# Patient Record
Sex: FEMALE | Race: WHITE | NOT HISPANIC OR LATINO | Employment: UNEMPLOYED | URBAN - METROPOLITAN AREA
[De-identification: names, ages, dates, MRNs, and addresses within clinical notes are randomized per-mention and may not be internally consistent; named-entity substitution may affect disease eponyms.]

---

## 2018-12-04 ENCOUNTER — OFFICE VISIT (OUTPATIENT)
Dept: RHEUMATOLOGY | Facility: CLINIC | Age: 58
End: 2018-12-04
Payer: COMMERCIAL

## 2018-12-04 VITALS
HEIGHT: 63 IN | DIASTOLIC BLOOD PRESSURE: 98 MMHG | HEART RATE: 80 BPM | BODY MASS INDEX: 33.49 KG/M2 | SYSTOLIC BLOOD PRESSURE: 140 MMHG | WEIGHT: 189 LBS

## 2018-12-04 DIAGNOSIS — R76.8 ANA POSITIVE: ICD-10-CM

## 2018-12-04 DIAGNOSIS — R21 RASH OF FACE: ICD-10-CM

## 2018-12-04 DIAGNOSIS — M25.50 POLYARTHRALGIA: Primary | ICD-10-CM

## 2018-12-04 PROCEDURE — 99204 OFFICE O/P NEW MOD 45 MIN: CPT | Performed by: INTERNAL MEDICINE

## 2018-12-04 RX ORDER — LANOLIN ALCOHOL/MO/W.PET/CERES
400 CREAM (GRAM) TOPICAL DAILY
COMMUNITY
End: 2021-05-21 | Stop reason: ALTCHOICE

## 2018-12-04 RX ORDER — RIVAROXABAN 20 MG/1
TABLET, FILM COATED ORAL
COMMUNITY
Start: 2018-11-17

## 2018-12-04 RX ORDER — MULTIVIT WITH MINERALS/LUTEIN
250 TABLET ORAL DAILY
COMMUNITY
End: 2021-05-21 | Stop reason: ALTCHOICE

## 2018-12-04 RX ORDER — BIOTIN 1000 MCG
TABLET,CHEWABLE ORAL
COMMUNITY
End: 2021-05-21 | Stop reason: ALTCHOICE

## 2018-12-04 RX ORDER — LOSARTAN POTASSIUM 25 MG/1
TABLET ORAL
COMMUNITY
Start: 2018-12-01

## 2018-12-04 NOTE — PROGRESS NOTES
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 who presents for rheumatology evaluation regarding positive NIKOLE test and facial rash  The rash on her face goes across her cheeks and nasal bridge in a does have nasolabial sparing, and is unclear at this time if this is truly a malar rash or related to rosacea or eczema  She also reportedly has a positive NIKOLE test although I do not have any lab work for review  I discussed with her that given these findings we will pursue a workup for underlying autoimmune disease, particularly to make sure we are not missing systemic lupus  She also has a history of unprovoked blood clotting and the prior workup regarding this is not available to me  My suspicion is somewhat low given lack of other typical features of lupus such as inflammatory arthritis, mucositis, history of serositis or bone marrow abnormalities  We discussed that a portion of the general healthy population has a positive NIKOLE test and so her test may not be clinically relevant but we will do the below workup to rule this out  She will follow up with me to review in 2 months  Plan:  Diagnoses and all orders for this visit:    Polyarthralgia  -     CBC and differential  -     Comprehensive metabolic panel  -     C3 complement  -     C4 complement  -     NIKOLE Screen w/ Reflex to Titer/Pattern  -     Anticardiolipin Ab, IgG/M, Qn  -     Anti-DNA antibody, double-stranded  -     Anti-Shahnaz 1 Antibody; Future  -     Anti-scleroderma antibody  -     Beta-2 glycoprotein antibodies  -     Lupus anticoagulant  -     Centromere Antibody  -     Sedimentation rate, automated  -     RF Screen w/ Reflex to Titer; Future  -     Urinalysis with microscopic  -     Protein / creatinine ratio, urine  -     Nuclear antigen antibody; Future  -     Sjogren's Antibodies;  Future  -     Cyclic citrul peptide antibody, IgG  -     Chronic Hepatitis Panel  - TSH, 3rd generation with Free T4 reflex  -     Vitamin D 25 hydroxy  -     Anti-Shahnaz 1 Antibody  -     Nuclear antigen antibody  -     Sjogren's Antibodies    NIKOLE positive  -     CBC and differential  -     Comprehensive metabolic panel  -     C3 complement  -     C4 complement  -     NIKOLE Screen w/ Reflex to Titer/Pattern  -     Anticardiolipin Ab, IgG/M, Qn  -     Anti-DNA antibody, double-stranded  -     Anti-Shahnaz 1 Antibody; Future  -     Anti-scleroderma antibody  -     Beta-2 glycoprotein antibodies  -     Lupus anticoagulant  -     Centromere Antibody  -     Sedimentation rate, automated  -     RF Screen w/ Reflex to Titer; Future  -     Urinalysis with microscopic  -     Protein / creatinine ratio, urine  -     Nuclear antigen antibody; Future  -     Sjogren's Antibodies; Future  -     Cyclic citrul peptide antibody, IgG  -     Chronic Hepatitis Panel  -     TSH, 3rd generation with Free T4 reflex  -     Vitamin D 25 hydroxy  -     Anti-Shahnaz 1 Antibody  -     Nuclear antigen antibody  -     Sjogren's Antibodies    Rash of face    Other orders  -     losartan (COZAAR) 25 mg tablet;   -     XARELTO 20 MG tablet;   -     folic acid (FOLVITE) 362 mcg tablet; Take 400 mcg by mouth daily  -     Biotin 1000 MCG CHEW; Chew  -     ascorbic acid (VITAMIN C) 250 mg tablet; Take 250 mg by mouth daily        Follow-up plan: F/u to review in 8 weeks       HPI  Lisa Ron is a 63 y o   female with HTN and h/o DVT/PE on chronic Xarelto, who presents for rheumatology evaluation for +NIKOLE test and polyarthralgia  Referred by [facility] (PCP)  Patient reports that she was referred to see me by her primary care doctor for positive NIKOLE test   Unfortunately the primary care office did not send any lab or notes relevant to the referral today and so I do not have any information for the visit      She reports that the NIKOLE test was checked because she has been complaining of nonspecific joint pain and has had a rash on her face for the last 1 year  She reports pain in her left knee, right shoulder, and to a lesser degree in her hands  She denies a significant amount of morning stiffness but generally feels better she remains active  The rash on her face goes over her cheeks and the bridge of her nose and fluctuate in intensity  It is generally red/pink and is very itchy  She reports that her PCP gave her a cream which helps somewhat but she cannot recall the name of the cream   She denies an association of the facial rash with sun exposure  She does not get a rash anywhere else and has no history of psoriasis  She reports that she has a history of DVT and subsequent PE 5 years ago, and is on lifelong Xarelto  She reports this was unprovoked and there was no ongoing medical illness or surgery at this time  She denies any miscarriages  She has 6 children and majority were healthy pregnancies except for 1 which was born 2 months premature  All of her children are healthy  She reports purple color changes of her fingers when she is cold  Denies photosensitivity, psoriasis, sicca symptoms, oral or nasal ulcers, alopecia, h/o pericarditis or pleurisy  Review of Systems  Review of Systems   Constitutional: Negative for chills, fatigue, fever and unexpected weight change  HENT: Negative for mouth sores and trouble swallowing  Eyes: Negative for pain and visual disturbance  Respiratory: Negative for cough and shortness of breath  Cardiovascular: Negative for chest pain and leg swelling  Gastrointestinal: Negative for abdominal pain, blood in stool, constipation, diarrhea and nausea  Genitourinary: Negative for hematuria  Musculoskeletal: Positive for arthralgias  Negative for back pain, joint swelling and myalgias  Skin: Positive for color change (purple ) and rash (facial rash)  Neurological: Negative for weakness and numbness  Hematological: Negative for adenopathy  Psychiatric/Behavioral: Negative for sleep disturbance  Allergies  Allergies   Allergen Reactions    Aspirin     Fluticasone        Home Medications    Current Outpatient Prescriptions:     ascorbic acid (VITAMIN C) 250 mg tablet, Take 250 mg by mouth daily, Disp: , Rfl:     Biotin 1000 MCG CHEW, Chew, Disp: , Rfl:     folic acid (FOLVITE) 321 mcg tablet, Take 400 mcg by mouth daily, Disp: , Rfl:     losartan (COZAAR) 25 mg tablet, , Disp: , Rfl:     XARELTO 20 MG tablet, , Disp: , Rfl:     Past Medical History  Past Medical History:   Diagnosis Date    History of DVT (deep vein thrombosis) 2013    History of pulmonary embolism 2013    Hypertension        Past Surgical History   Past Surgical History:   Procedure Laterality Date    BREAST MASS EXCISION  "Benign" per patient         Family History  No known family history of autoimmune or inflammatory diseases  Possible RA in her mother   Family History   Problem Relation Age of Onset    Deep vein thrombosis Father        Social History  Occupation: Works in [employer] service for a [employer]   History   Alcohol Use    Yes     Comment: socially     History   Drug use: Unknown     History   Smoking Status    Never Smoker   Smokeless Tobacco    Never Used       Objective:    Vitals:    12/04/18 1323   BP: 140/98   Pulse: 80   Weight: 85 7 kg (189 lb)   Height: 5' 3" (1 6 m)       Physical Exam   Constitutional: She appears well-developed and well-nourished  No distress  HENT:   Head: Normocephalic  Mouth/Throat: Oropharynx is clear and moist and mucous membranes are normal    Eyes: Conjunctivae and EOM are normal  No scleral icterus  Neck: No spinous process tenderness and no muscular tenderness present  No thyromegaly present  Cardiovascular: Normal rate, regular rhythm, S1 normal and S2 normal   Exam reveals no friction rub  No murmur heard  Pulmonary/Chest: Effort normal and breath sounds normal  No respiratory distress   She has no wheezes  She has no rhonchi  She has no rales  Abdominal: Soft  She exhibits no distension  There is no hepatosplenomegaly  There is no tenderness  Musculoskeletal:   Some tissue tender in the   Peripheral joints are tender and the right glenohumeral joint and right elbow  No obvious synovitis on joint exam    Lymphadenopathy:     She has no cervical adenopathy  Neurological: She is alert  She has normal strength  Skin: Skin is warm and dry  No rash noted  Nails show no clubbing  Erythema in malar distribution with nasolabial sparing    Psychiatric: She has a normal mood and affect         Imaging:   No [facility] imaging available for review     Labs:   No labs sent for review

## 2018-12-04 NOTE — LETTER
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 who presents for rheumatology evaluation regarding positive NIKOLE test and facial rash  The rash on her face goes across her cheeks and nasal bridge in a does have nasolabial sparing, and is unclear at this time if this is truly a malar rash or related to rosacea or eczema  She also reportedly has a positive NIKOLE test although I do not have any lab work for review  I discussed with her that given these findings we will pursue a workup for underlying autoimmune disease, particularly to make sure we are not missing systemic lupus  She also has a history of unprovoked blood clotting and the prior workup regarding this is not available to me  My suspicion is somewhat low given lack of other typical features of lupus such as inflammatory arthritis, mucositis, history of serositis or bone marrow abnormalities  We discussed that a portion of the general healthy population has a positive NIKOLE test and so her test may not be clinically relevant but we will do the below workup to rule this out  She will follow up with me to review in 2 months      Plan:  Diagnoses and all orders for this visit:    Polyarthralgia  -     CBC and differential  -     Comprehensive metabolic panel  -     C3 complement  -     C4 complement  -     NIKOLE Screen w/ Reflex to Titer/Pattern  -     Anticardiolipin Ab, IgG/M, Qn  -     Anti-DNA antibody, double-stranded  -     Anti-Shahnaz 1 Antibody; Future  -     Anti-scleroderma antibody  -     Beta-2 glycoprotein antibodies  -     Lupus anticoagulant  -     Centromere Antibody  -     Sedimentation rate, automated  -     RF Screen w/ Reflex to Titer; Future  -     Urinalysis with microscopic  -     Protein / creatinine ratio, urine  -     Nuclear antigen antibody; Future  -     Sjogren's Antibodies; Future  -     Cyclic citrul peptide antibody, IgG  -     Chronic Hepatitis Panel  -     TSH, 3rd generation with Free T4 reflex  -     Vitamin D 25 hydroxy  -     Anti-Shahnaz 1 Antibody  -     Nuclear antigen antibody  -     Sjogren's Antibodies    NIKOLE positive  -     CBC and differential  -     Comprehensive metabolic panel  -     C3 complement  -     C4 complement  -     NIKOLE Screen w/ Reflex to Titer/Pattern  -     Anticardiolipin Ab, IgG/M, Qn  -     Anti-DNA antibody, double-stranded  -     Anti-Shahnaz 1 Antibody; Future  -     Anti-scleroderma antibody  -     Beta-2 glycoprotein antibodies  -     Lupus anticoagulant  -     Centromere Antibody  -     Sedimentation rate, automated  -     RF Screen w/ Reflex to Titer; Future  -     Urinalysis with microscopic  -     Protein / creatinine ratio, urine  -     Nuclear antigen antibody; Future  -     Sjogren's Antibodies; Future  -     Cyclic citrul peptide antibody, IgG  -     Chronic Hepatitis Panel  -     TSH, 3rd generation with Free T4 reflex  -     Vitamin D 25 hydroxy  -     Anti-Shahnaz 1 Antibody  -     Nuclear antigen antibody  -     Sjogren's Antibodies    Rash of face    Other orders  -     losartan (COZAAR) 25 mg tablet;   -     XARELTO 20 MG tablet;   -     folic acid (FOLVITE) 649 mcg tablet; Take 400 mcg by mouth daily  -     Biotin 1000 MCG CHEW; Chew  -     ascorbic acid (VITAMIN C) 250 mg tablet;  Take 250 mg by mouth daily        Follow-up plan: F/u to review in 8 weeks       HPI  Mari Lizama is a 63 y o   female with HTN and h/o DVT/PE on chronic Xarelto, who presents for rheumatology evaluation for +NIKOLE test and polyarthralgia  Referred by [facility] (PCP)  Patient reports that she was referred to see me by her primary care doctor for positive NIKOLE test   Unfortunately the primary care office did not send any lab or notes relevant to the referral today and so I do not have any information for the visit  She reports that the NIKOLE test was checked because she has been complaining of nonspecific joint pain and has had a rash on her face for the last 1 year  She reports pain in her left knee, right shoulder, and to a lesser degree in her hands  She denies a significant amount of morning stiffness but generally feels better she remains active  The rash on her face goes over her cheeks and the bridge of her nose and fluctuate in intensity  It is generally red/pink and is very itchy  She reports that her PCP gave her a cream which helps somewhat but she cannot recall the name of the cream   She denies an association of the facial rash with sun exposure  She does not get a rash anywhere else and has no history of psoriasis  She reports that she has a history of DVT and subsequent PE 5 years ago, and is on lifelong Xarelto  She reports this was unprovoked and there was no ongoing medical illness or surgery at this time  She denies any miscarriages  She has 6 children and majority were healthy pregnancies except for 1 which was born 2 months premature  All of her children are healthy  She reports purple color changes of her fingers when she is cold  Denies photosensitivity, psoriasis, sicca symptoms, oral or nasal ulcers, alopecia, h/o pericarditis or pleurisy  Review of Systems  Review of Systems   Constitutional: Negative for chills, fatigue, fever and unexpected weight change  HENT: Negative for mouth sores and trouble swallowing  Eyes: Negative for pain and visual disturbance     Respiratory: Negative for cough and shortness of breath  Cardiovascular: Negative for chest pain and leg swelling  Gastrointestinal: Negative for abdominal pain, blood in stool, constipation, diarrhea and nausea  Genitourinary: Negative for hematuria  Musculoskeletal: Positive for arthralgias  Negative for back pain, joint swelling and myalgias  Skin: Positive for color change (purple ) and rash (facial rash)  Neurological: Negative for weakness and numbness  Hematological: Negative for adenopathy  Psychiatric/Behavioral: Negative for sleep disturbance  Allergies  Allergies   Allergen Reactions    Aspirin     Fluticasone        Home Medications    Current Outpatient Prescriptions:     ascorbic acid (VITAMIN C) 250 mg tablet, Take 250 mg by mouth daily, Disp: , Rfl:     Biotin 1000 MCG CHEW, Chew, Disp: , Rfl:     folic acid (FOLVITE) 012 mcg tablet, Take 400 mcg by mouth daily, Disp: , Rfl:     losartan (COZAAR) 25 mg tablet, , Disp: , Rfl:     XARELTO 20 MG tablet, , Disp: , Rfl:     Past Medical History  Past Medical History:   Diagnosis Date    History of DVT (deep vein thrombosis) 2013    History of pulmonary embolism 2013    Hypertension        Past Surgical History   Past Surgical History:   Procedure Laterality Date    BREAST MASS EXCISION  "Benign" per patient         Family History  No known family history of autoimmune or inflammatory diseases  Possible RA in her mother   Family History   Problem Relation Age of Onset    Deep vein thrombosis Father        Social History  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   History   Alcohol Use    Yes     Comment: socially     History   Drug use: Unknown     History   Smoking Status    Never Smoker   Smokeless Tobacco    Never Used       Objective:    Vitals:    12/04/18 1323   BP: 140/98   Pulse: 80   Weight: 85 7 kg (189 lb)   Height: 5' 3" (1 6 m)       Physical Exam   Constitutional: She appears well-developed and well-nourished  No distress     HENT: Head: Normocephalic  Mouth/Throat: Oropharynx is clear and moist and mucous membranes are normal    Eyes: Conjunctivae and EOM are normal  No scleral icterus  Neck: No spinous process tenderness and no muscular tenderness present  No thyromegaly present  Cardiovascular: Normal rate, regular rhythm, S1 normal and S2 normal   Exam reveals no friction rub  No murmur heard  Pulmonary/Chest: Effort normal and breath sounds normal  No respiratory distress  She has no wheezes  She has no rhonchi  She has no rales  Abdominal: Soft  She exhibits no distension  There is no hepatosplenomegaly  There is no tenderness  Musculoskeletal:   Some tissue tender in the   Peripheral joints are tender and the right glenohumeral joint and right elbow  No obvious synovitis on joint exam    Lymphadenopathy:     She has no cervical adenopathy  Neurological: She is alert  She has normal strength  Skin: Skin is warm and dry  No rash noted  Nails show no clubbing  Erythema in malar distribution with nasolabial sparing    Psychiatric: She has a normal mood and affect         Imaging:   No [facility] imaging available for review     Labs:   No labs sent for review

## 2018-12-12 LAB
25(OH)D3+25(OH)D2 SERPL-MCNC: 31.7 NG/ML (ref 30–100)
ALBUMIN SERPL-MCNC: 4.6 G/DL (ref 3.5–5.5)
ALBUMIN/GLOB SERPL: 2.2 {RATIO} (ref 1.2–2.2)
ALP SERPL-CCNC: 52 IU/L (ref 39–117)
ALT SERPL-CCNC: 19 IU/L (ref 0–32)
ANA TITR SER IF: POSITIVE {TITER}
APPEARANCE UR: CLEAR
APTT SCREEN TO CONFIRM RATIO: 0.81 RATIO (ref 0–1.4)
AST SERPL-CCNC: 19 IU/L (ref 0–40)
B2 GLYCOPROT1 IGA SER-ACNC: <9 GPI IGA UNITS (ref 0–25)
B2 GLYCOPROT1 IGG SER-ACNC: <9 GPI IGG UNITS (ref 0–20)
B2 GLYCOPROT1 IGM SER-ACNC: <9 GPI IGM UNITS (ref 0–32)
BACTERIA URNS QL MICRO: NORMAL
BASOPHILS # BLD AUTO: 0 X10E3/UL (ref 0–0.2)
BASOPHILS NFR BLD AUTO: 0 %
BILIRUB SERPL-MCNC: 0.3 MG/DL (ref 0–1.2)
BILIRUB UR QL STRIP: NEGATIVE
BUN SERPL-MCNC: 13 MG/DL (ref 6–24)
BUN/CREAT SERPL: 15 (ref 9–23)
C3 SERPL-MCNC: 116 MG/DL (ref 82–167)
C4 SERPL-MCNC: 20 MG/DL (ref 14–44)
CALCIUM SERPL-MCNC: 9.5 MG/DL (ref 8.7–10.2)
CARDIOLIPIN IGG SER IA-ACNC: <9 GPL U/ML (ref 0–14)
CARDIOLIPIN IGM SER IA-ACNC: <9 MPL U/ML (ref 0–12)
CCP IGA+IGG SERPL IA-ACNC: 179 UNITS (ref 0–19)
CENTROMERE AB TITR SER IF: NORMAL {TITER}
CENTROMERE B AB SER-ACNC: <0.2 AI (ref 0–0.9)
CHLORIDE SERPL-SCNC: 102 MMOL/L (ref 96–106)
CO2 SERPL-SCNC: 26 MMOL/L (ref 20–29)
COLOR UR: YELLOW
CONFIRM APTT/NORMAL: 44.7 SEC (ref 0–55)
CREAT SERPL-MCNC: 0.86 MG/DL (ref 0.57–1)
CREAT UR-MCNC: 34.5 MG/DL
DSDNA AB SER-ACNC: 7 IU/ML (ref 0–9)
ENA JO1 AB SER-ACNC: <0.2 AI (ref 0–0.9)
ENA RNP AB SER-ACNC: <0.2 AI (ref 0–0.9)
ENA SCL70 AB SER-ACNC: <0.2 AI (ref 0–0.9)
ENA SM AB SER-ACNC: <0.2 AI (ref 0–0.9)
ENA SS-A AB SER-ACNC: <0.2 AI (ref 0–0.9)
ENA SS-B AB SER-ACNC: <0.2 AI (ref 0–0.9)
EOSINOPHIL # BLD AUTO: 0.1 X10E3/UL (ref 0–0.4)
EOSINOPHIL NFR BLD AUTO: 2 %
EPI CELLS #/AREA URNS HPF: NORMAL /HPF
ERYTHROCYTE [DISTWIDTH] IN BLOOD BY AUTOMATED COUNT: 13.8 % (ref 12.3–15.4)
ERYTHROCYTE [SEDIMENTATION RATE] IN BLOOD BY WESTERGREN METHOD: 9 MM/HR (ref 0–40)
GLOBULIN SER-MCNC: 2.1 G/DL (ref 1.5–4.5)
GLUCOSE SERPL-MCNC: 74 MG/DL (ref 65–99)
GLUCOSE UR QL: NEGATIVE
HAV IGM SERPL QL IA: NEGATIVE
HBV CORE IGM SERPL QL IA: NEGATIVE
HBV SURFACE AG SERPL QL IA: NEGATIVE
HCT VFR BLD AUTO: 41.6 % (ref 34–46.6)
HCV AB S/CO SERPL IA: 0.2 S/CO RATIO (ref 0–0.9)
HGB BLD-MCNC: 14 G/DL (ref 11.1–15.9)
HGB UR QL STRIP: NEGATIVE
IMM GRANULOCYTES # BLD: 0 X10E3/UL (ref 0–0.1)
IMM GRANULOCYTES NFR BLD: 0 %
KETONES UR QL STRIP: NEGATIVE
LA PPP-IMP: NORMAL
LEUKOCYTE ESTERASE UR QL STRIP: NEGATIVE
LYMPHOCYTES # BLD AUTO: 2.8 X10E3/UL (ref 0.7–3.1)
LYMPHOCYTES NFR BLD AUTO: 42 %
MCH RBC QN AUTO: 30.5 PG (ref 26.6–33)
MCHC RBC AUTO-ENTMCNC: 33.7 G/DL (ref 31.5–35.7)
MCV RBC AUTO: 91 FL (ref 79–97)
MICRO URNS: NORMAL
MICRO URNS: NORMAL
MONOCYTES # BLD AUTO: 0.5 X10E3/UL (ref 0.1–0.9)
MONOCYTES NFR BLD AUTO: 8 %
NEUTROPHILS # BLD AUTO: 3.2 X10E3/UL (ref 1.4–7)
NEUTROPHILS NFR BLD AUTO: 48 %
NITRITE UR QL STRIP: NEGATIVE
PH UR STRIP: 7.5 [PH] (ref 5–7.5)
PLATELET # BLD AUTO: 243 X10E3/UL (ref 150–379)
POTASSIUM SERPL-SCNC: 4.3 MMOL/L (ref 3.5–5.2)
PROT SERPL-MCNC: 6.7 G/DL (ref 6–8.5)
PROT UR QL STRIP: NEGATIVE
PROT UR-MCNC: 4.2 MG/DL
PROT/CREAT UR: 122 MG/G CREAT (ref 0–200)
RBC # BLD AUTO: 4.59 X10E6/UL (ref 3.77–5.28)
RBC #/AREA URNS HPF: NORMAL /HPF
RHEUMATOID FACT SERPL-ACNC: 10.6 IU/ML (ref 0–13.9)
SCREEN APTT: 36.7 SEC (ref 0–51.9)
SCREEN DRVVT: 38 SEC (ref 0–47)
SL AMB EGFR AFRICAN AMERICAN: 87 ML/MIN/1.73
SL AMB EGFR NON AFRICAN AMERICAN: 75 ML/MIN/1.73
SL AMB NOTE:: NORMAL
SODIUM SERPL-SCNC: 140 MMOL/L (ref 134–144)
SP GR UR: 1.01 (ref 1–1.03)
THROMBIN TIME: 16.2 SEC (ref 0–23)
TSH SERPL DL<=0.005 MIU/L-ACNC: 0.95 UIU/ML (ref 0.45–4.5)
UROBILINOGEN UR STRIP-ACNC: 0.2 EU/DL (ref 0.2–1)
WBC # BLD AUTO: 6.6 X10E3/UL (ref 3.4–10.8)
WBC #/AREA URNS HPF: NORMAL /HPF

## 2021-05-19 ENCOUNTER — IMMUNIZATIONS (OUTPATIENT)
Dept: FAMILY MEDICINE CLINIC | Facility: HOSPITAL | Age: 61
End: 2021-05-19

## 2021-05-19 DIAGNOSIS — Z23 ENCOUNTER FOR IMMUNIZATION: Primary | ICD-10-CM

## 2021-05-19 PROCEDURE — 0001A SARS-COV-2 / COVID-19 MRNA VACCINE (PFIZER-BIONTECH) 30 MCG: CPT

## 2021-05-19 PROCEDURE — 91300 SARS-COV-2 / COVID-19 MRNA VACCINE (PFIZER-BIONTECH) 30 MCG: CPT

## 2021-05-20 NOTE — PROGRESS NOTES
Assessment and Plan:   Patient is a 71-year-old female who presents for rheumatology follow-up, previously seen in 2018 and at that time had joint pain an a positive CCP antibody of moderate titer, but no clinical evidence for active rheumatoid  She did not follow-up after that point and returns today with right knee pain and right lower back pain with sciatica symptoms down the right lower extremity  She does not have any typical complaints to suggest active rheumatoid and we will recheck on these labs at this time  Her exam also does not show any concerns for active inflammatory arthritis which we discussed  I did refer her to spine and Pain Management for her lower back pain and sciatica, and also to Sports Medicine for her right knee pain as she is unable to have an MRI done and may benefit from ultrasound to assess the knee further  She was agreeable with that plan  We will otherwise follow-up in about 6 months  Plan:  Diagnoses and all orders for this visit:    Cyclic citrullinated peptide (CCP) antibody positive  -     Cyclic citrul peptide antibody, IgG  -     C-reactive protein  -     Sedimentation rate, automated  -     RF Screen w/ Reflex to Titer  -     CBC and differential  -     Comprehensive metabolic panel    Polyarthralgia  -     Cyclic citrul peptide antibody, IgG  -     C-reactive protein  -     Sedimentation rate, automated  -     RF Screen w/ Reflex to Titer  -     CBC and differential  -     Comprehensive metabolic panel    NIKOLE positive  -     Cyclic citrul peptide antibody, IgG  -     C-reactive protein  -     Sedimentation rate, automated  -     RF Screen w/ Reflex to Titer  -     CBC and differential  -     Comprehensive metabolic panel    Chronic right-sided low back pain with right-sided sciatica  -     Ambulatory referral to Pain Management; Future    Chronic pain of right knee  -     Ambulatory referral to Sports Medicine;  Future    Other orders  -     Cancel: XR hand 3+ vw right; Future  -     Cancel: XR hand 3+ vw left; Future        Follow-up plan: 6 months        Rheumatic Disease Summary  1  +CCP antibody, +NIKOLE  -Rheum eval 12/4/18 for polyarthralgia, +NIKOLE, facial rash; no clinical evidence of synovitis   -Labs: +NIKOLE 1:80 centromere with negative panel, +, negative RF, APLs, Hep B/C, C3/4  -Visit 5/25/21: suspect sciatica and right knee OA, referred to spine/PM and sports med; no evidence for active IA   2  H/o DVT and PE, on lifelong xarelto     HPI  Rufus Evans is a 62 y o   female who presents for rheumatology follow-up, last seen for initial evaluation in December 2018, at that time for positive NIKOLE test   Further workup was unrevealing for any positive autoantibodies associated with the NIKOLE, but she did have a moderate to high titer positive CCP antibody  At that time, she did not have any clinical signs of rheumatoid arthritis and the plan was to continue monitoring, she did not return for follow-up after that point  Reports she is coming back in for increased joint pain in the right knee, right lower back with radiation down the right leg  Also having right knee pain which is separate from the pain radiating down her right leg  She denies any injury in the right knee  She states she cannot have an MRI because she has a permanent IVC filter for her blood clotting disorder  She is wondering if other imaging like ultrasound can be useful  She pulls up reports of x-ray of the lumbar spine and right knee on her phone which showed osteoarthritis in the knee, and degenerative arthritis and disc disease in the lumbar spine  She otherwise does not have complaints about joint pain or prolonged stiffness in the morning in other joints such as her wrists, hands and feet  No joint swelling      The following portions of the patient's history were reviewed and updated as appropriate: allergies, current medications, past family history, past medical history, past social history, past surgical history and problem list     Review of Systems:   Review of Systems   Constitutional: Negative for fatigue and unexpected weight change  HENT: Negative for mouth sores  Respiratory: Negative for cough and shortness of breath  Gastrointestinal: Negative for constipation and diarrhea  Musculoskeletal: Positive for arthralgias and back pain  Negative for joint swelling and myalgias  Skin: Negative for color change and rash  Neurological: Negative for weakness  Psychiatric/Behavioral: Negative for sleep disturbance  Home Medications:    Current Outpatient Medications:     losartan (COZAAR) 25 mg tablet, , Disp: , Rfl:     XARELTO 20 MG tablet, , Disp: , Rfl:     Objective:    Vitals:    05/25/21 1248   Pulse: 84   Weight: 91 8 kg (202 lb 6 4 oz)   Height: 5' 3" (1 6 m)       Physical Exam  Constitutional:       General: She is not in acute distress  Appearance: She is well-developed  HENT:      Head: Normocephalic and atraumatic  Eyes:      General: Lids are normal  No scleral icterus  Conjunctiva/sclera: Conjunctivae normal    Neck:      Musculoskeletal: Neck supple  Pulmonary:      Effort: Pulmonary effort is normal  No tachypnea, accessory muscle usage or respiratory distress  Musculoskeletal:      Comments: No joint swelling or synovitis anywhere  Skin:     General: Skin is dry  Findings: No rash  Neurological:      Mental Status: She is alert  Psychiatric:         Behavior: Behavior normal  Behavior is cooperative  Labs:   Component      Latest Ref Rng & Units 12/7/2018           3:43 PM   White Blood Cell Count      3 4 - 10 8 x10E3/uL 6 6   Red Blood Cell Count      3 77 - 5 28 x10E6/uL 4 59   Hemoglobin      11 1 - 15 9 g/dL 14 0   HCT      34 0 - 46 6 % 41 6   MCV      79 - 97 fL 91   MCH      26 6 - 33 0 pg 30 5   MCHC        31 5 - 35 7 g/dL 33 7   RDW      12 3 - 15 4 % 13 8   Platelet Count      674 - 379 x10E3/uL 243   Neutrophils      Not Estab  % 48   Lymphocytes      Not Estab  % 42   Monocytes      Not Estab  % 8   Eosinophils      Not Estab  % 2   Basophils PCT      Not Estab  % 0   Neutrophils (Absolute)      1 4 - 7 0 x10E3/uL 3 2   Lymphocytes (Absolute)      0 7 - 3 1 x10E3/uL 2 8   Monocytes (Absolute)      0 1 - 0 9 x10E3/uL 0 5   Eosinophils (Absolute)      0 0 - 0 4 x10E3/uL 0 1   Basophils ABS      0 0 - 0 2 x10E3/uL 0 0   Immature Granulocytes      Not Estab  % 0   Immature Granulocytes (Absolute)      0 0 - 0 1 x10E3/uL 0 0   Glucose, Random      65 - 99 mg/dL 74   BUN      6 - 24 mg/dL 13   Creatinine      0 57 - 1 00 mg/dL 0 86   eGFR Non       >59 mL/min/1 73 75   eGFR       >59 mL/min/1 73 87   SL AMB BUN/CREATININE RATIO      9 - 23 15   Sodium      134 - 144 mmol/L 140   Potassium      3 5 - 5 2 mmol/L 4 3   Chloride      96 - 106 mmol/L 102   CO2      20 - 29 mmol/L 26   CALCIUM      8 7 - 10 2 mg/dL 9 5   Total Protein      6 0 - 8 5 g/dL 6 7   Albumin      3 5 - 5 5 g/dL 4 6   Globulin, Total      1 5 - 4 5 g/dL 2 1   Albumin/Globulin Ratio      1 2 - 2 2 2 2   TOTAL BILIRUBIN      0 0 - 1 2 mg/dL 0 3   ALKALINE PHOSPHATASE ISOENZYMES      39 - 117 IU/L 52   AST      0 - 40 IU/L 19   ALT      0 - 32 IU/L 19   SL AMB SPECIFIC GRAVITY-URINE      1 005 - 1 030    pH      5 0 - 7 5    Color, UA      Yellow    Urine Appearance      Clear    Leukocytes, UA      Negative    Protein      Negative/Trace    Glucose, 24 HR Urine      Negative    Ketones, UA      Negative    Blood, UA      Negative    Bilirubin, Urine      Negative    SL AMB POCT UROBILINOGEN      0 2 - 1 0 EU/dL    Nitrite, UA      Negative    MICROSCOPIC EXAMINATION          DILUTE PROTHROMBIN TIME(DPT)      0 0 - 55 0 sec 44 7   DPT CONFIRM RATIO      0 00 - 1 40 Ratio 0 81   THROMBIN TIME (DRVW)      0 0 - 23 0 sec 16 2   PTT LUPUS ANTICOAGULANT      0 0 - 51 9 sec 36 7   DILUTE GEOVANY VIPER VENOM TIME 0 0 - 47 0 sec 38 0   LUPUS REFLEX INTERPRETATION       Comment:   WBC, UA      0 - 5 /hpf 0-5   RBC, UA      0 - 2 /hpf None seen   EPITHELEAL UA      0 - 10 /hpf 0-10   Bacteria, UA      None seen/Few Few   HEPATITIS A IGM ANTIBODY      Negative Negative   HBsAg Screen      Negative Negative   HEPATITIS B CORE IGM ANTIBODY      Negative Negative   HEPATITIS C ANTIBODY      0 0 - 0 9 s/co ratio 0 2   BETA-2 GLYCOPROTEIN 1 IGG ANTIBODY      0 - 20 GPI IgG units <9   BETA-2 GLYCOPROTEIN 1 IGA ANTIBODY      0 - 25 GPI IgA units <9   BETA-2 GLYCOPROTEIN 1 IGM ANTIBODY      0 - 32 GPI IgM units <9   EXT Creatinine Urine      Not Estab  mg/dL 34 5   POCT URINE PROTEIN      Not Estab  mg/dL 4 2   Prot/Creat Ratio, Ur      0 - 200 mg/g creat 122   ANTICARDIOLIPIN IGG ANTIBODY      0 - 14 GPL U/mL <9   ANTICARDIOLIPIN IGM ANTIBODY      0 - 12 MPL U/mL <9   JOSEFINA TO RNP ANTIBODY      0 0 - 0 9 AI <0 2   JOSEFINA TO SMITH (SM) ANTIBODY      0 0 - 0 9 AI <0 2   SSA (RO) ANTIBODY      0 0 - 0 9 AI <0 2   SSB (LA) ANTIBODY      0 0 - 0 9 AI <0 2   Centromere Pattern       1:80   Note       Comment   C3 Complement      82 - 167 mg/dL 116   C4, COMPLEMENT      14 - 44 mg/dL 20   ANTI DNA DOUBLE STRANDED      0 - 9 IU/mL 7   SCLERODERMA (SCL-70) AB      0 0 - 0 9 AI <0 2   Anti-Centromere B Antibodies      0 0 - 0 9 AI <0 2   Sed Rate      0 - 40 mm/hr 9   CYCLIC CITRULLINATED PEPTIDE ANTIBODY      0 - 19 units 179 (H)   TSH, POC      0 450 - 4 500 uIU/mL 0 951   25-Hydroxy, Vitamin D      30 0 - 100 0 ng/mL 31 7   ANTI ANIA-1 IGG      0 0 - 0 9 AI <0 2   RA LATEX TURBID      0 0 - 13 9 IU/mL 10 6   Antinuclear Antibodies, IFA       Positive (A)     Component      Latest Ref Rng & Units 12/7/2018           3:43 PM   SL AMB SPECIFIC GRAVITY-URINE      1 005 - 1 030 1 010   pH      5 0 - 7 5 7 5   Color, UA      Yellow Yellow   Urine Appearance      Clear Clear   Leukocytes, UA      Negative Negative   Protein      Negative/Trace Negative Glucose, 24 HR Urine      Negative Negative   Ketones, UA      Negative Negative   Blood, UA      Negative Negative   Bilirubin, Urine      Negative Negative   SL AMB POCT UROBILINOGEN      0 2 - 1 0 EU/dL 0 2   Nitrite, UA      Negative Negative

## 2021-05-25 ENCOUNTER — OFFICE VISIT (OUTPATIENT)
Dept: RHEUMATOLOGY | Facility: CLINIC | Age: 61
End: 2021-05-25
Payer: COMMERCIAL

## 2021-05-25 VITALS — HEART RATE: 84 BPM | WEIGHT: 202.4 LBS | BODY MASS INDEX: 35.86 KG/M2 | HEIGHT: 63 IN

## 2021-05-25 DIAGNOSIS — G89.29 CHRONIC RIGHT-SIDED LOW BACK PAIN WITH RIGHT-SIDED SCIATICA: ICD-10-CM

## 2021-05-25 DIAGNOSIS — R76.8 CYCLIC CITRULLINATED PEPTIDE (CCP) ANTIBODY POSITIVE: Primary | ICD-10-CM

## 2021-05-25 DIAGNOSIS — M25.50 POLYARTHRALGIA: ICD-10-CM

## 2021-05-25 DIAGNOSIS — R76.8 ANA POSITIVE: ICD-10-CM

## 2021-05-25 DIAGNOSIS — G89.29 CHRONIC PAIN OF RIGHT KNEE: ICD-10-CM

## 2021-05-25 DIAGNOSIS — M25.561 CHRONIC PAIN OF RIGHT KNEE: ICD-10-CM

## 2021-05-25 DIAGNOSIS — M54.41 CHRONIC RIGHT-SIDED LOW BACK PAIN WITH RIGHT-SIDED SCIATICA: ICD-10-CM

## 2021-05-25 PROCEDURE — 99215 OFFICE O/P EST HI 40 MIN: CPT | Performed by: INTERNAL MEDICINE

## 2021-06-07 ENCOUNTER — OFFICE VISIT (OUTPATIENT)
Dept: OBGYN CLINIC | Facility: CLINIC | Age: 61
End: 2021-06-07
Payer: COMMERCIAL

## 2021-06-07 ENCOUNTER — APPOINTMENT (OUTPATIENT)
Dept: RADIOLOGY | Facility: CLINIC | Age: 61
End: 2021-06-07
Payer: COMMERCIAL

## 2021-06-07 VITALS
BODY MASS INDEX: 35.79 KG/M2 | WEIGHT: 202 LBS | SYSTOLIC BLOOD PRESSURE: 122 MMHG | DIASTOLIC BLOOD PRESSURE: 68 MMHG | HEIGHT: 63 IN

## 2021-06-07 DIAGNOSIS — G89.29 CHRONIC PAIN OF RIGHT KNEE: ICD-10-CM

## 2021-06-07 DIAGNOSIS — M25.559 HIP PAIN: ICD-10-CM

## 2021-06-07 DIAGNOSIS — M22.2X1 PATELLOFEMORAL PAIN SYNDROME OF RIGHT KNEE: Primary | ICD-10-CM

## 2021-06-07 DIAGNOSIS — M25.561 CHRONIC PAIN OF RIGHT KNEE: ICD-10-CM

## 2021-06-07 DIAGNOSIS — M70.61 TROCHANTERIC BURSITIS OF RIGHT HIP: ICD-10-CM

## 2021-06-07 PROCEDURE — 73564 X-RAY EXAM KNEE 4 OR MORE: CPT

## 2021-06-07 PROCEDURE — 1036F TOBACCO NON-USER: CPT | Performed by: FAMILY MEDICINE

## 2021-06-07 PROCEDURE — 73502 X-RAY EXAM HIP UNI 2-3 VIEWS: CPT

## 2021-06-07 PROCEDURE — 3008F BODY MASS INDEX DOCD: CPT | Performed by: FAMILY MEDICINE

## 2021-06-07 PROCEDURE — 99244 OFF/OP CNSLTJ NEW/EST MOD 40: CPT | Performed by: FAMILY MEDICINE

## 2021-06-07 NOTE — PATIENT INSTRUCTIONS
Explained the patient that she has multiple issues with her right leg pain  On examination today she does have clinical evidence of patellofemoral pain syndrome localized to the right knee  See below for further details of patellofemoral pain syndrome  For patient's right-sided hip and leg pain in the lateral aspect explained she likely has iliotibial band syndrome with trochanteric bursitis at the hip pointer  I recommended physical therapy treatment trial at this time  She is to avoid any NSAIDs due to blood thinners associated with previous clots and pulmonary embolism  She may take Tylenol as needed for pain per instructions below  I have also recommended physical therapy trial   If she does not improve we will consider ultrasound-guided corticosteroid injection to the right knee and right lateral hip region at the trochanteric bursa of the hip pointer  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 pain syndrome) AKA runner's knee  Chondromalacia patella (softening and wear of knee cap cartilaginous cushion) is another name sometimes used interchangeably to capture the physiological effects of PFPS  The patella (knee cap) moves on a track along your femur and many people have improper tracking of the knee cap which results in popping off of the groove track, wear of the patella and causes pain under the knee cap and on the sides of the knee cap  Many people, even those in great shape or runners, are unaware that they have tight hamstrings or calf muscles, weak gluteus medius butt muscles, or a weak inner thigh quad muscle known as the VMO  Others have inherent risk factors such as flat feet (pes planus)  Treatment requires a rest phase and a rehabilitation phase  Patients with severe symptoms benefit greatest from complete rest to avoid running and at the very least start cross-training using stationary bike   Moderate to severe symptoms may require reduction of intensity (avoiding hills, steps) and length of training  Rehabilitation requires PT (physical therapy)- both home, and if ordered, formal at the PT office  PT is the curative treatment as it works over time to strengthen the appropriate muscles while your knee heals and some studies do show increased benefit of formal PT over home exercises  You should expect to see significant improvement after 6 weeks of daily therapy but be prepared to not see full results for 3 months  If you continue to run or stress your knee, then you will prevent healing and you should not expect to see much improvements even with PT  Taping and bracing offering some relief but no longterm cure  Steroid Injections help to reduce pain but there is no long-term benefit and there is some concerns that steroids may soften the cartilage under the knee cap even more  Other injections (PRP, stem cells) are experimental, generally not covered by insurance, and have expensive out of pocket costs  As such, I recommend considering these only after exhausting other options  Surgical treatment is only recommended to consider after failing 24 months of therapy  Long-term effects of untreated PFPS include softening and wearing away of the cartilage under the knee cap (chondromalacia patella) and eventually to patellofemoral arthritis (significant wear of cartilage cushion under the knee cap) and chronic pain that may require knee replacement  Home exercises can be found at: https://www  aafp org/afp/1999/1101/p2019 html  (MERY Momin 2018)  I recommend against taking anti-inflammatory medications also known NSAIDs  (non-steroidal anti-inflammatory pills including advil, ibuprofen, motrin, meloxicam, celecoxib, aleve, naproxen, and aspirin)   These medications should not be used in the setting of [place] high blood pressure, kidney disease, stomach ulcers, gastrointestinal bleeding from the rectum or the stomach, or simultaneously with blood thinners  Risks of taking NSAIDs include severely elevated blood pressure that can lead to stroke and heart attack, kidney failure, and severe internal bleeding  If you have no liver problems, you may take Tylenol (also known as acetaminophen) at a  maximum of 1,000  Mg per dose every 6 hours as needed for pain but no more 3 doses or 3,000 mg per day  If you are taking other medications which include tylenol (acetaminophen) such as hydrocodone-acetaminophen then you must factor in the amount of tylenol (acetamionphen) into your 3,000mg maximum dose  Patient expressed understanding and agreed to plan

## 2021-06-07 NOTE — PROGRESS NOTES
1  Patellofemoral pain syndrome of right knee  [facility]    Diclofenac Sodium (VOLTAREN) 1 %    Brace   2  Chronic pain of right knee  Ambulatory referral to Sports Medicine    XR knee 4+ vw right injury   3  Hip pain  XR hip/pelv 2-3 vws right if performed   4  Trochanteric bursitis of right hip  [facility]    Diclofenac Sodium (VOLTAREN) 1 %     Orders Placed This Encounter   Procedures    Brace    XR knee 4+ vw right injury    XR hip/pelv 2-3 vws right if performed    [facility]        Imaging Studies (I personally reviewed images in PACS and report):   x-ray AP pelvis right hip 06/07/2021:   Mild hip osteoarthritis  X-ray right knee 06/07/2021:   No acute abnormality  No significant arthritis    IMPRESSION:  Right knee patellofemoral pain syndrome   Right leg pain secondary to greater trochanteric process syndrome    PMH:  Thrombophilia -DVT, multiple PEs- IVC filter, blood thinner -avoid oral NSAIDs  Follows with rheumatology -elevated CCP- no definitive diagnosis of rheumatism   IVC filter -not a candidate for MRI    Differential diagnosis:   Lumbar radiculopathy  Fibromygalgia     Repeat X-ray next visit:    none      Return in about 8 weeks (around 8/2/2021)  Patient Instructions   Explained the patient that she has multiple issues with her right leg pain  On examination today she does have clinical evidence of patellofemoral pain syndrome localized to the right knee  See below for further details of patellofemoral pain syndrome  For patient's right-sided hip and leg pain in the lateral aspect explained she likely has iliotibial band syndrome with trochanteric bursitis at the hip pointer  I recommended physical therapy treatment trial at this time  She is to avoid any NSAIDs due to blood thinners associated with previous clots and pulmonary embolism  She may take Tylenol as needed for pain per instructions below    I have also recommended physical therapy trial   If she does not improve we will consider ultrasound-guided corticosteroid injection to the right knee and right lateral hip region at the trochanteric bursa of the hip pointer  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 pain syndrome) AKA runner's knee  Chondromalacia patella (softening and wear of knee cap cartilaginous cushion) is another name sometimes used interchangeably to capture the physiological effects of PFPS  The patella (knee cap) moves on a track along your femur and many people have improper tracking of the knee cap which results in popping off of the groove track, wear of the patella and causes pain under the knee cap and on the sides of the knee cap  Many people, even those in great shape or runners, are unaware that they have tight hamstrings or calf muscles, weak gluteus medius butt muscles, or a weak inner thigh quad muscle known as the VMO  Others have inherent risk factors such as flat feet (pes planus)  Treatment requires a rest phase and a rehabilitation phase  Patients with severe symptoms benefit greatest from complete rest to avoid running and at the very least start cross-training using stationary bike  Moderate to severe symptoms may require reduction of intensity (avoiding hills, steps) and length of training  Rehabilitation requires PT (physical therapy)- both home, and if ordered, formal at the PT office  PT is the curative treatment as it works over time to strengthen the appropriate muscles while your knee heals and some studies do show increased benefit of formal PT over home exercises  You should expect to see significant improvement after 6 weeks of daily therapy but be prepared to not see full results for 3 months  If you continue to run or stress your knee, then you will prevent healing and you should not expect to see much improvements even with PT  Taping and bracing offering some relief but no longterm cure      Steroid Injections help to reduce pain but there is no long-term benefit and there is some concerns that steroids may soften the cartilage under the knee cap even more  Other injections (PRP, stem cells) are experimental, generally not covered by insurance, and have expensive out of pocket costs  As such, I recommend considering these only after exhausting other options  Surgical treatment is only recommended to consider after failing 24 months of therapy  Long-term effects of untreated PFPS include softening and wearing away of the cartilage under the knee cap (chondromalacia patella) and eventually to patellofemoral arthritis (significant wear of cartilage cushion under the knee cap) and chronic pain that may require knee replacement  Home exercises can be found at: https://www  aafp org/afp/1999/1101/p2019 html  (MERY Mccartney Primer 2018)  I recommend against taking anti-inflammatory medications also known NSAIDs  (non-steroidal anti-inflammatory pills including advil, ibuprofen, motrin, meloxicam, celecoxib, aleve, naproxen, and aspirin)  These medications should not be used in the setting of un[place]rolled high blood pressure, kidney disease, stomach ulcers, gastrointestinal bleeding from the rectum or the stomach, or simultaneously with blood thinners  Risks of taking NSAIDs include severely elevated blood pressure that can lead to stroke and heart attack, kidney failure, and severe internal bleeding  If you have no liver problems, you may take Tylenol (also known as acetaminophen) at a  maximum of 1,000  Mg per dose every 6 hours as needed for pain but no more 3 doses or 3,000 mg per day  If you are taking other medications which include tylenol (acetaminophen) such as hydrocodone-acetaminophen then you must factor in the amount of tylenol (acetamionphen) into your 3,000mg maximum dose  Patient expressed understanding and agreed to plan                 CHIEF COMPLAINT:   right leg pain    HPI:  Carlee Peter is a 62 y o  female who presents for       Visit 6/7/2021 :   evaluation of right leg pain a consultation request of rheumatologist Dr Shirley Garcia  Patient has had positive CCP with moderate tighter and no definitive diagnosis of rheumatism  Patient complains of right leg pain in an interrupted pattern across her leg localized to the lateral hip and buttock region as well as to the right knee  Intermittent moderate intensity worse with walking  knee pain is diffuse  Vague and nonspecific  Denies any new onset numbness or tingling paresthesias or electric shocks to the right leg  Denies any back pain  Denies any pain radiating from the back down the leg into the foot  Associated symptoms include snapping and clicking on the lateral aspect of the hip and the knee          Review of Systems   Constitutional: Negative for chills, fever and unexpected weight change  HENT: Negative for hearing loss, nosebleeds and sore throat  Eyes: Negative for pain, redness and visual disturbance  Respiratory: Negative for cough, shortness of breath and wheezing  Cardiovascular: Negative for chest pain, palpitations and leg swelling  Gastrointestinal: Negative for abdominal distention, nausea and vomiting  Endocrine: Negative for polydipsia and polyuria  Genitourinary: Negative for dysuria and hematuria  Skin: Negative for rash and wound  Neurological: Negative for dizziness, numbness and headaches  Psychiatric/Behavioral: Negative for decreased concentration and suicidal ideas           Following history reviewed and update:    Past Medical History:   Diagnosis Date    History of DVT (deep vein thrombosis) 2013    History of pulmonary embolism 2013    Hypertension      Past Surgical History:   Procedure Laterality Date    BREAST MASS EXCISION       Social History   Social History     Substance and Sexual Activity   Alcohol Use Yes    Frequency: Monthly or less    Drinks per session: 1 or 2    Comment: socially     Social History     Substance and Sexual Activity   Drug Use Never     Social History     Tobacco Use   Smoking Status Never Smoker   Smokeless Tobacco Never Used     Family History   Problem Relation Age of Onset    Deep vein thrombosis Father      Allergies   Allergen Reactions    Aspirin     Fluticasone           Physical Exam  /68   Ht 5' 3" (1 6 m)   Wt 91 6 kg (202 lb)   BMI 35 78 kg/m²     Constitutional:  see vital signs  Gen: well-developed, normocephalic/atraumatic, well-groomed  Eyes: No inflammation or discharge of conjunctiva or lids; sclera clear   Pharynx: no inflammation, lesion, or mass of lips  Neck: supple, no masses, non-distended  MSK: no inflammation, lesion, mass, or clubbing of nails and digits except for other than mentioned below  SKIN: no visible rashes or skin lesions  Pulmonary/Chest: Effort normal  No respiratory distress     NEURO: cranial nerves grossly intact  PSYCH:  Alert and oriented to person, place, and time; recent and remote memory intact; mood normal, no depression, anxiety, or agitation, judgment and insight good and intact     Ortho Exam  BACK EXAM:  Gait: normal, no trendelenberg gait, no antalgic gait    BACK TENDERNESS:  Spinous Processes: no  Paraspinal Muscles: no  SI Joint: no  Sacrum: no    ROM:  Flexion: intact  Extension: intact  Sidebending: intact    DERMATOMAL SENSATION:  L1: normal   L2: normal   L3: normal   L4: normal   L5: normal   S1: normal    STRENGTH (bilateral):  Knee Extension: 5/5  Knee Flexion: 5/5  Foot Dorsiflexion: 5/5  Great Toe Extension: 5/5  Foot Plantarflexion: 5/5  Hip Flexion: 5/5  Hip Abduction: 5/5    REFLEXES:  Patellar: 2+ bilateral  Achilles: 2+ bilateral  Clonus: negative bilateral    BACK:   SUPINE STRAIGHT LEG: negative  PRONE STRAIGHT LEG:  SLUMP: negative    RIGHT HIP:  + right piriformis region  LOG ROLL: negative  JACKSON: negative  FADIR: negative    LEFT HIP:  LOG ROLL: negative  JACKSON: negative  FADIR: negative    RIGHT KNEE:  Erythema: no  Swelling: no  Increased Warmth: no  Tenderness: + medial joint line but does not reproduce chief complaint of pain  Flexion: intact  Extension: intact  Patellar Displacement:  Patellar Tilt:  Patellar Apprehension: negative  Patellar Grind Arce's: +  Lachman's: negative  Drawer: negative  Varus laxity: negative  Valgus laxity: negative  Ayla: negative   Thessaly Test:  Dial Test:               Procedures

## 2021-06-08 ENCOUNTER — TELEPHONE (OUTPATIENT)
Dept: OBGYN CLINIC | Facility: HOSPITAL | Age: 61
End: 2021-06-08

## 2021-06-08 ENCOUNTER — TELEPHONE (OUTPATIENT)
Dept: PAIN MEDICINE | Facility: CLINIC | Age: 61
End: 2021-06-08

## 2021-06-08 NOTE — TELEPHONE ENCOUNTER
Left msg to call Ortho office to inform we have mailed her script for knee stabilizing brace to her home

## 2021-06-17 ENCOUNTER — IMMUNIZATIONS (OUTPATIENT)
Dept: FAMILY MEDICINE CLINIC | Facility: HOSPITAL | Age: 61
End: 2021-06-17

## 2021-06-17 DIAGNOSIS — Z23 ENCOUNTER FOR IMMUNIZATION: Primary | ICD-10-CM

## 2021-06-17 PROCEDURE — 91300 SARS-COV-2 / COVID-19 MRNA VACCINE (PFIZER-BIONTECH) 30 MCG: CPT

## 2021-06-17 PROCEDURE — 0002A SARS-COV-2 / COVID-19 MRNA VACCINE (PFIZER-BIONTECH) 30 MCG: CPT

## 2021-07-07 ENCOUNTER — TELEPHONE (OUTPATIENT)
Dept: OBGYN CLINIC | Facility: OTHER | Age: 61
End: 2021-07-07

## 2021-07-13 ENCOUNTER — TELEPHONE (OUTPATIENT)
Dept: OBGYN CLINIC | Facility: CLINIC | Age: 61
End: 2021-07-13

## 2021-07-14 NOTE — TELEPHONE ENCOUNTER
Patient needing to complete Physical Therapy before MRI can be approved    Can they do PT and if so can you put order in? Thanks      Let me know when done so I can call patient

## 2021-07-15 NOTE — TELEPHONE ENCOUNTER
PT order has been placed and faxed to fax# provided    Confirmation that fax went through was received

## 2021-07-22 ENCOUNTER — TELEPHONE (OUTPATIENT)
Dept: OBGYN CLINIC | Facility: HOSPITAL | Age: 61
End: 2021-07-22

## 2021-07-22 LAB
ALBUMIN SERPL-MCNC: 4.5 G/DL (ref 3.8–4.9)
ALBUMIN/GLOB SERPL: 2.1 {RATIO} (ref 1.2–2.2)
ALP SERPL-CCNC: 60 IU/L (ref 48–121)
ALT SERPL-CCNC: 25 IU/L (ref 0–32)
AST SERPL-CCNC: 20 IU/L (ref 0–40)
BASOPHILS # BLD AUTO: 0 X10E3/UL (ref 0–0.2)
BASOPHILS NFR BLD AUTO: 1 %
BILIRUB SERPL-MCNC: 0.3 MG/DL (ref 0–1.2)
BUN SERPL-MCNC: 13 MG/DL (ref 8–27)
BUN/CREAT SERPL: 18 (ref 12–28)
CALCIUM SERPL-MCNC: 9.6 MG/DL (ref 8.7–10.3)
CCP IGA+IGG SERPL IA-ACNC: 130 UNITS (ref 0–19)
CHLORIDE SERPL-SCNC: 103 MMOL/L (ref 96–106)
CO2 SERPL-SCNC: 25 MMOL/L (ref 20–29)
CREAT SERPL-MCNC: 0.71 MG/DL (ref 0.57–1)
CRP SERPL-MCNC: 2 MG/L (ref 0–10)
EOSINOPHIL # BLD AUTO: 0.1 X10E3/UL (ref 0–0.4)
EOSINOPHIL NFR BLD AUTO: 2 %
ERYTHROCYTE [DISTWIDTH] IN BLOOD BY AUTOMATED COUNT: 13 % (ref 11.7–15.4)
ERYTHROCYTE [SEDIMENTATION RATE] IN BLOOD BY WESTERGREN METHOD: 29 MM/HR (ref 0–40)
GLOBULIN SER-MCNC: 2.1 G/DL (ref 1.5–4.5)
GLUCOSE SERPL-MCNC: 89 MG/DL (ref 65–99)
HCT VFR BLD AUTO: 42.8 % (ref 34–46.6)
HGB BLD-MCNC: 14.4 G/DL (ref 11.1–15.9)
IMM GRANULOCYTES # BLD: 0 X10E3/UL (ref 0–0.1)
IMM GRANULOCYTES NFR BLD: 0 %
LYMPHOCYTES # BLD AUTO: 2.3 X10E3/UL (ref 0.7–3.1)
LYMPHOCYTES NFR BLD AUTO: 38 %
MCH RBC QN AUTO: 29.9 PG (ref 26.6–33)
MCHC RBC AUTO-ENTMCNC: 33.6 G/DL (ref 31.5–35.7)
MCV RBC AUTO: 89 FL (ref 79–97)
MONOCYTES # BLD AUTO: 0.6 X10E3/UL (ref 0.1–0.9)
MONOCYTES NFR BLD AUTO: 9 %
NEUTROPHILS # BLD AUTO: 3.1 X10E3/UL (ref 1.4–7)
NEUTROPHILS NFR BLD AUTO: 50 %
PLATELET # BLD AUTO: 241 X10E3/UL (ref 150–450)
POTASSIUM SERPL-SCNC: 4.3 MMOL/L (ref 3.5–5.2)
PROT SERPL-MCNC: 6.6 G/DL (ref 6–8.5)
RBC # BLD AUTO: 4.82 X10E6/UL (ref 3.77–5.28)
SL AMB EGFR AFRICAN AMERICAN: 107 ML/MIN/1.73
SL AMB EGFR NON AFRICAN AMERICAN: 93 ML/MIN/1.73
SODIUM SERPL-SCNC: 141 MMOL/L (ref 134–144)
WBC # BLD AUTO: 6.1 X10E3/UL (ref 3.4–10.8)

## 2021-07-22 NOTE — TELEPHONE ENCOUNTER
Patient sees Dr Lara Robles  Patient called asking, if she could have her test results over the phone?      # [phone]

## 2021-07-27 ENCOUNTER — TELEPHONE (OUTPATIENT)
Dept: OBGYN CLINIC | Facility: HOSPITAL | Age: 61
End: 2021-07-27

## 2021-07-27 NOTE — TELEPHONE ENCOUNTER
Patient sees Dr Neil Paige    Patient called trying to schedule with Dr Neil Paige for right knee pain before she heads off on vacation 8/11  Patient requested she be put on a cancellation list  Patient was advised that we do not have one at this time  Patient states that she requested her son be put on a cancellation list and she received a call back from our office with an appointment for him  Do we have a cancellation list in [address] [place] [address]? If so, can this patient be put on it?     Call back # [phone]

## 2021-08-03 ENCOUNTER — APPOINTMENT (OUTPATIENT)
Dept: RADIOLOGY | Facility: CLINIC | Age: 61
End: 2021-08-03
Payer: COMMERCIAL

## 2021-08-03 ENCOUNTER — OFFICE VISIT (OUTPATIENT)
Dept: OBGYN CLINIC | Facility: CLINIC | Age: 61
End: 2021-08-03
Payer: COMMERCIAL

## 2021-08-03 VITALS
BODY MASS INDEX: 35.26 KG/M2 | SYSTOLIC BLOOD PRESSURE: 122 MMHG | HEIGHT: 63 IN | WEIGHT: 199 LBS | DIASTOLIC BLOOD PRESSURE: 80 MMHG

## 2021-08-03 DIAGNOSIS — M54.31 SCIATICA OF RIGHT SIDE: ICD-10-CM

## 2021-08-03 DIAGNOSIS — M22.2X1 PATELLOFEMORAL DISORDER OF RIGHT KNEE: Primary | ICD-10-CM

## 2021-08-03 PROCEDURE — 72110 X-RAY EXAM L-2 SPINE 4/>VWS: CPT

## 2021-08-03 PROCEDURE — 1036F TOBACCO NON-USER: CPT | Performed by: PHYSICIAN ASSISTANT

## 2021-08-03 PROCEDURE — 99213 OFFICE O/P EST LOW 20 MIN: CPT | Performed by: PHYSICIAN ASSISTANT

## 2021-08-03 PROCEDURE — 3008F BODY MASS INDEX DOCD: CPT | Performed by: PHYSICIAN ASSISTANT

## 2021-08-03 NOTE — PROGRESS NOTES
Orthopaedics Office Visit - Follow up Patient Visit    ASSESSMENT/PLAN:    Assessment:   Patellofemoral syndrome right knee   Trochanteric bursitis right hip   Sciatica right side, spondylolisthesis L5-S1        Plan:   - Discussed conservative treatment with patient at length  - discussed case with Dr Digna Morillo at length  cortisone injection of the right knee deferred at this time until patient received CT scan of the right knee  - patient unable to take NSAIDs secondary to anticoagulation use  - patient is unable to take oral steroids secondary to steroid allergy   - patient unable to receive MRI secondary to IVC filter  - offered patient cortisone injection to the right hip  Patient deferred at this time  - physical therapy ordered for the right knee and the lumbar spine   - Voltaren gel ordered for patient  - follow-up with Dr Digna Morillo as scheduled       To Do Next Visit:  Evaluate right knee pain     _____________________________________________________  CHIEF COMPLAINT:  No chief complaint on file  SUBJECTIVE:  Sherwin Fitzpatrick is a 62 y o  female who presents to the office for a follow-up evaluation of her right knee and for an initial evaluation of her lumbar spine  Patient states that her left knee is doing slightly better overall since her last visit on 06/07/2021  Patient states that she was referred for a CT of the right knee secondary to presence of IVC filter to evaluate for internal derangement of the right knee  Patient was unable to received CT of the right knee secondary to a "not enough conservative treatment " Patient states that her pain is mostly in the anterior portion of the knee but does extend into the posterior aspect of the knee becomes worse with range of motion of the knee and prolonged weight-bearing and activity  Patient states that her pain does extend up into the leg and into her back    Patient does admit to having a different type of pain in her leg in comparison to the knee  Patient states she has been attending physical therapy with moderate relief of symptoms  Patient states that she has been using diclofenac with moderate relief of symptoms  Patient states that her symptoms have been ongoing for the past 11 months in duration with no injury of note  Patient states that her pain does begin in the back and does extend down the anterior portion of the stopping at the knee  Patient does have weakness in her leg  Patient states that she does experience intermittent tingling in the right leg but denies any definitive numbness in the leg  Patient is unable to take anti-inflammatory medication secondary to anticoagulation  Patient offers no other complaints at this time  PAST MEDICAL HISTORY:  Past Medical History:   Diagnosis Date    History of DVT (deep vein thrombosis) 2013    History of pulmonary embolism 2013    Hypertension        PAST SURGICAL HISTORY:  Past Surgical History:   Procedure Laterality Date    BREAST MASS EXCISION         FAMILY HISTORY:  Family History   Problem Relation Age of Onset    Deep vein thrombosis Father        SOCIAL HISTORY:  Social History     Tobacco Use    Smoking status: Never Smoker    Smokeless tobacco: Never Used   Substance Use Topics    Alcohol use: Yes     Comment: socially    Drug use: Never       MEDICATIONS:    Current Outpatient Medications:     Diclofenac Sodium (VOLTAREN) 1 %, Apply 2 g topically 4 (four) times a day, Disp: 150 g, Rfl: 2    losartan (COZAAR) 25 mg tablet, , Disp: , Rfl:     XARELTO 20 MG tablet, , Disp: , Rfl:     ALLERGIES:  Allergies   Allergen Reactions    Aspirin     Fluticasone        REVIEW OF SYSTEMS:  MSK: right knee pain   Neuro: WNL   Pertinent items are otherwise noted in HPI  A comprehensive review of systems was otherwise negative      LABS:  HgA1c: No results found for: HGBA1C  BMP:   Lab Results   Component Value Date    K 4 3 07/20/2021    CO2 25 07/20/2021  07/20/2021    BUN 13 07/20/2021    CREATININE 0 71 07/20/2021     CBC: No components found for: CBC    _____________________________________________________  PHYSICAL EXAMINATION:  Vital signs: /80   Ht 5' 3" (1 6 m)   Wt 90 3 kg (199 lb)   BMI 35 25 kg/m²   General: No acute distress, awake and alert  Psychiatric: Mood and affect appear appropriate  HEENT: Trachea Midline, No torticollis, no apparent facial trauma  Cardiovascular: No audible murmurs; Extremities appear perfused  Pulmonary: No audible wheezing or stridor  Skin: No open lesions; see further details (if any) below      MUSCULOSKELETAL EXAMINATION:  Right knee examination:  - Patient sitting comfortably in the office in no acute distress   - no acute visible abnormalities present in the right knee  Extremity appears well-perfused overall  - diffuse tenderness palpation noted throughout knee predominantly in the suprapatellar region and peripatellar region  No other bony or soft tissue tenderness palpation noted at this time  - 0 to 110° range of motion noted limited by pain     Patellar crepitus appreciated during range of motion  - 5/5 strength noted in all lower extremity muscle groups  - Special Tests       - ligamentously intact  - NV intact    _____________________________________________________  STUDIES REVIEWED:  I personally reviewed the images and interpretation is as follows:  Lumbar spine x-ray four views:  Mild degenerative disc disease noted at L5-S1 with grade 1 spondylolisthesis at L5-S1  No other acute visible abnormalities present      PROCEDURES PERFORMED:  No procedures were performed at this time         Staci Rogers PA-C

## 2021-09-02 ENCOUNTER — TELEPHONE (OUTPATIENT)
Dept: OBGYN CLINIC | Facility: OTHER | Age: 61
End: 2021-09-02

## 2021-09-02 NOTE — TELEPHONE ENCOUNTER
----- Message from Galo Michel III, DO sent at 8/27/2021 11:19 AM EDT -----  Regarding: FW: Test Results Question  Contact: [phone]  Please call patient and determine what her insurance requires for auth for ct scans      ----- Message -----  From: Stephanie Knott MA  Sent: 8/26/2021   8:51 AM EDT  To: Galo Michel III, DO  Subject: FW: Test Results Question                          ----- Message -----  From: Kimberly Sullivan  Sent: 8/25/2021   9:53 AM EDT  To: [facility]  Subject: Test Results Question                            Morning I need my recent X-rays results and imaging sent to me  Also I need a note why I need cat scan for right knee and lower back in order for insurance to cover it   Thais Sinks   even with physical therapy it's on going and having pain everyday im  I'm in [place] at my sons till 8/31 and been doing the pool every other day and doing basically what I've been doing at physical therapy and still on going issues / pain here as well      please mail or I can pick  Up at my visit at [facility] on 9/1

## 2021-09-02 NOTE — TELEPHONE ENCOUNTER
Left message for patient stating she will need 6 weeks of formal physical therapy in order for her insurance to cover CT Scans  Also, in order for us to send her xray results, we will need a consent form signed  I gave her my number to call me back in regards to this

## 2021-09-20 ENCOUNTER — OFFICE VISIT (OUTPATIENT)
Dept: OBGYN CLINIC | Facility: CLINIC | Age: 61
End: 2021-09-20
Payer: COMMERCIAL

## 2021-09-20 VITALS — WEIGHT: 199 LBS | BODY MASS INDEX: 35.25 KG/M2

## 2021-09-20 DIAGNOSIS — M54.50 CHRONIC RIGHT-SIDED LOW BACK PAIN WITHOUT SCIATICA: Primary | ICD-10-CM

## 2021-09-20 DIAGNOSIS — G89.29 CHRONIC RIGHT-SIDED LOW BACK PAIN WITHOUT SCIATICA: Primary | ICD-10-CM

## 2021-09-20 DIAGNOSIS — G89.29 CHRONIC PAIN OF RIGHT KNEE: ICD-10-CM

## 2021-09-20 DIAGNOSIS — M25.461 EFFUSION OF RIGHT KNEE: ICD-10-CM

## 2021-09-20 DIAGNOSIS — M25.561 CHRONIC PAIN OF RIGHT KNEE: ICD-10-CM

## 2021-09-20 DIAGNOSIS — M70.61 TROCHANTERIC BURSITIS OF RIGHT HIP: ICD-10-CM

## 2021-09-20 PROCEDURE — 99214 OFFICE O/P EST MOD 30 MIN: CPT | Performed by: FAMILY MEDICINE

## 2021-09-20 RX ORDER — DIAZEPAM 5 MG/1
5 TABLET ORAL
Qty: 2 TABLET | Refills: 0 | Status: SHIPPED | OUTPATIENT
Start: 2021-09-20 | End: 2021-12-07 | Stop reason: SDUPTHER

## 2021-09-20 NOTE — PROGRESS NOTES
1  Chronic right-sided low back pain without sciatica  CT spine lumbar wo contrast    Ambulatory referral to Pain Management   2  Trochanteric bursitis of right hip     3  Chronic pain of right knee  CT lower extremity wo contrast right    diazepam (VALIUM) 5 mg tablet   4  Effusion of right knee  diazepam (VALIUM) 5 mg tablet     Orders Placed This Encounter   Procedures    CT spine lumbar wo contrast    CT lower extremity wo contrast right    Ambulatory referral to Pain Management        Imaging Studies (I personally reviewed images in PACS and report):      IMPRESSION:   right leg pain  Differential diagnosis:  Lumbar radiculopathy, troch bursitis     Right knee effusion  X-ray no significant arthritis   Differential diagnosis:   Meniscal tear, inflammatory arthropathy    PMH:  IVC filter-no MRIs, needle phobia      Repeat X-ray next visit: None    Return for follow-up for ultrasound guided procedure  Patient Instructions   Explained the patient that her back pain and right-sided hip pain is likely due to facet arthritis as well as right-sided greater trochanteric process syndrome  I have ordered CT scan lumbar vertebrae since she is not a candidate for MRI due to a IVC filter  She is to follow-up with Pain Management for further treatment options  For patient's right knee she does have effusion swelling with no significant arthritis on her previous knee x-rays  Explained that she could have a meniscal tear but also has risk of possible stress fracture  I have ordered CT scan again for her right knee  I did order the CT scan approximately 3 months ago but patient tells me this was denied by her insurance company  I have also asked patient to return for ultrasound-guided aspiration of her right knee  Patient does have needle phobia and as such I provided her with Valium to take just prior to procedure  She is to have  for her procedure next visit            CHIEF COMPLAINT:  Follow-up back hip pain and right knee pain    HPI:  Fabrice Wright is a 62 y o  female  who presents for       Visit 9/20/2021 : Follow-up right knee pain:  Patient points to diffuse pain posterior as well as anterior  I did order CT scan of the right knee to evaluate for possible stress fracture  Patient is not a candidate for MRI due to IV see filter  Her insurance has denied this CT scan  She did see physician assistant while I was out on surgical leave and I recommended against corticosteroid injection at that time until we further clarify the cause of her knee pain  For patient's right hip and back pain she does point to the right lower back as well as traces sensation of pain around her greater trochanter towards the knee  Denies any persistent recurrent pain below the knee into the foot but she does have intermittent sciatic at times  Denies any numbness and tingling of the lower extremities  Review of Systems   Constitutional: Negative for chills, fever and unexpected weight change  HENT: Negative for hearing loss, nosebleeds and sore throat  Eyes: Negative for pain, redness and visual disturbance  Respiratory: Negative for cough, shortness of breath and wheezing  Cardiovascular: Negative for chest pain, palpitations and leg swelling  Gastrointestinal: Negative for abdominal distention, nausea and vomiting  Endocrine: Negative for polydipsia and polyuria  Genitourinary: Negative for dysuria and hematuria  Skin: Negative for rash and wound  Neurological: Negative for dizziness, numbness and headaches  Psychiatric/Behavioral: Negative for decreased concentration and suicidal ideas           Following history reviewed and update:    Past Medical History:   Diagnosis Date    History of DVT (deep vein thrombosis) 2013    History of pulmonary embolism 2013    Hypertension      Past Surgical History:   Procedure Laterality Date    BREAST MASS EXCISION       Social History   Social History     Substance and Sexual Activity   Alcohol Use Yes    Comment: socially     Social History     Substance and Sexual Activity   Drug Use Never     Social History     Tobacco Use   Smoking Status Never Smoker   Smokeless Tobacco Never Used     Family History   Problem Relation Age of Onset    Deep vein thrombosis Father      Allergies   Allergen Reactions    Aspirin     Fluticasone           Physical Exam  Wt 90 3 kg (199 lb)   BMI 35 25 kg/m²     Constitutional:  see vital signs  Gen: well-developed, normocephalic/atraumatic, well-groomed  Eyes: No inflammation or discharge of conjunctiva or lids; sclera clear   Pharynx: no inflammation, lesion, or mass of lips  Neck: supple, no masses, non-distended  MSK: no inflammation, lesion, mass, or clubbing of nails and digits except for other than mentioned below  SKIN: no visible rashes or skin lesions  Pulmonary/Chest: Effort normal  No respiratory distress     NEURO: cranial nerves grossly intact  PSYCH:  Alert and oriented to person, place, and time; recent and remote memory intact; mood normal, no depression, anxiety, or agitation, judgment and insight good and intact     Ortho Exam  RIGHT KNEE:  Erythema: no  Swelling: + 3  Increased Warmth: no  Tenderness:  Diffuse nonspecific anterior  Flexion: intact  Extension: intact  Lachman's: negative  Drawer: negative  Varus laxity: negative  Valgus laxity: negative  [facility]: + equivocal    BACK EXAM:  Gait: normal, no trendelenberg gait, no antalgic gait    BACK TENDERNESS:  Spinous Processes: no  Paraspinal Muscles: + right lower lumbar  SI Joint: no  Sacrum: no    ROM:  Flexion: intact  Extension: intact  Sidebending: intact    DERMATOMAL SENSATION:  L1: normal   L2: normal   L3: normal   L4: normal   L5: normal   S1: normal    STRENGTH (bilateral):  Knee Extension: 5/5  Knee Flexion: 5/5  Foot Dorsiflexion: 5/5  Great Toe Extension: 5/5  Foot Plantarflexion: 5/5  Hip Flexion: 5/5  Hip Abduction: 5/5    REFLEXES:  Patellar: 2+ bilateral  Achilles: 2+ bilateral  Clonus: negative bilateral    BACK:   SUPINE STRAIGHT LEG: negative  PRONE STRAIGHT LEG:  SLUMP: negative    RIGHT HIP:  LOG ROLL: negative  JACKSON: negative  FADIR: negative    LEFT HIP:  LOG ROLL: negative  JACKSON: negative  FADIR: negative    Procedures

## 2021-09-20 NOTE — PATIENT INSTRUCTIONS
Explained the patient that her back pain and right-sided hip pain is likely due to facet arthritis as well as right-sided greater trochanteric process syndrome  I have ordered CT scan lumbar vertebrae since she is not a candidate for MRI due to a IVC filter  She is to follow-up with Pain Management for further treatment options  For patient's right knee she does have effusion swelling with no significant arthritis on her previous knee x-rays  Explained that she could have a meniscal tear but also has risk of possible stress fracture  I have ordered CT scan again for her right knee  I did order the CT scan approximately 3 months ago but patient tells me this was denied by her insurance company  I have also asked patient to return for ultrasound-guided aspiration of her right knee  Patient does have needle phobia and as such I provided her with Valium to take just prior to procedure  She is to have  for her procedure next visit

## 2021-09-22 ENCOUNTER — TELEPHONE (OUTPATIENT)
Dept: OBGYN CLINIC | Facility: CLINIC | Age: 61
End: 2021-09-22

## 2021-09-25 ENCOUNTER — HOSPITAL ENCOUNTER (OUTPATIENT)
Dept: CT IMAGING | Facility: HOSPITAL | Age: 61
Discharge: HOME | End: 2021-09-25
Attending: FAMILY MEDICINE
Payer: COMMERCIAL

## 2021-09-25 DIAGNOSIS — G89.29 CHRONIC PAIN OF RIGHT KNEE: ICD-10-CM

## 2021-09-25 DIAGNOSIS — M54.50 CHRONIC RIGHT-SIDED LOW BACK PAIN WITHOUT SCIATICA: ICD-10-CM

## 2021-09-25 DIAGNOSIS — M25.561 CHRONIC PAIN OF RIGHT KNEE: ICD-10-CM

## 2021-09-25 DIAGNOSIS — G89.29 CHRONIC RIGHT-SIDED LOW BACK PAIN WITHOUT SCIATICA: ICD-10-CM

## 2021-09-25 PROCEDURE — G1004 CDSM NDSC: HCPCS

## 2021-09-25 PROCEDURE — 73700 CT LOWER EXTREMITY W/O DYE: CPT

## 2021-09-25 PROCEDURE — 72131 CT LUMBAR SPINE W/O DYE: CPT

## 2021-10-11 ENCOUNTER — TELEPHONE (OUTPATIENT)
Dept: OBGYN CLINIC | Facility: HOSPITAL | Age: 61
End: 2021-10-11

## 2021-10-27 ENCOUNTER — OFFICE VISIT (OUTPATIENT)
Dept: OBGYN CLINIC | Facility: CLINIC | Age: 61
End: 2021-10-27
Payer: COMMERCIAL

## 2021-10-27 VITALS
SYSTOLIC BLOOD PRESSURE: 132 MMHG | HEIGHT: 63 IN | BODY MASS INDEX: 34.55 KG/M2 | DIASTOLIC BLOOD PRESSURE: 84 MMHG | WEIGHT: 195 LBS

## 2021-10-27 DIAGNOSIS — G89.29 CHRONIC PAIN OF RIGHT KNEE: Primary | ICD-10-CM

## 2021-10-27 DIAGNOSIS — M25.561 CHRONIC PAIN OF RIGHT KNEE: Primary | ICD-10-CM

## 2021-10-27 PROCEDURE — 20611 DRAIN/INJ JOINT/BURSA W/US: CPT | Performed by: FAMILY MEDICINE

## 2021-10-27 RX ORDER — CEPHALEXIN 500 MG/1
CAPSULE ORAL
COMMUNITY
Start: 2021-08-11 | End: 2021-12-27

## 2021-10-27 RX ADMIN — LIDOCAINE HYDROCHLORIDE 4 ML: 10 INJECTION, SOLUTION INFILTRATION; PERINEURAL at 13:00

## 2021-10-27 RX ADMIN — TRIAMCINOLONE ACETONIDE 40 MG: 40 INJECTION, SUSPENSION INTRA-ARTICULAR; INTRAMUSCULAR at 13:00

## 2021-10-29 ENCOUNTER — TELEPHONE (OUTPATIENT)
Dept: OBGYN CLINIC | Facility: OTHER | Age: 61
End: 2021-10-29

## 2021-10-29 RX ORDER — LIDOCAINE HYDROCHLORIDE 10 MG/ML
4 INJECTION, SOLUTION INFILTRATION; PERINEURAL
Status: COMPLETED | OUTPATIENT
Start: 2021-10-27 | End: 2021-10-27

## 2021-10-29 RX ORDER — TRIAMCINOLONE ACETONIDE 40 MG/ML
40 INJECTION, SUSPENSION INTRA-ARTICULAR; INTRAMUSCULAR
Status: COMPLETED | OUTPATIENT
Start: 2021-10-27 | End: 2021-10-27

## 2021-10-29 RX ORDER — LIDOCAINE HYDROCHLORIDE 20 MG/ML
5 INJECTION, SOLUTION EPIDURAL; INFILTRATION; INTRACAUDAL; PERINEURAL
Status: COMPLETED | OUTPATIENT
Start: 2021-10-29 | End: 2021-10-29

## 2021-10-29 RX ADMIN — LIDOCAINE HYDROCHLORIDE 5 ML: 20 INJECTION, SOLUTION EPIDURAL; INFILTRATION; INTRACAUDAL; PERINEURAL at 17:25

## 2021-11-08 ENCOUNTER — APPOINTMENT (OUTPATIENT)
Dept: RADIOLOGY | Facility: CLINIC | Age: 61
End: 2021-11-08
Payer: COMMERCIAL

## 2021-11-08 ENCOUNTER — OFFICE VISIT (OUTPATIENT)
Dept: PODIATRY | Facility: CLINIC | Age: 61
End: 2021-11-08
Payer: COMMERCIAL

## 2021-11-08 VITALS
SYSTOLIC BLOOD PRESSURE: 137 MMHG | DIASTOLIC BLOOD PRESSURE: 88 MMHG | WEIGHT: 195 LBS | HEART RATE: 71 BPM | BODY MASS INDEX: 34.54 KG/M2

## 2021-11-08 DIAGNOSIS — M20.5X2 ACQUIRED HALLUX LIMITUS OF BOTH FEET: ICD-10-CM

## 2021-11-08 DIAGNOSIS — M20.5X1 ACQUIRED HALLUX LIMITUS OF BOTH FEET: ICD-10-CM

## 2021-11-08 DIAGNOSIS — M20.5X2 ACQUIRED HALLUX LIMITUS OF BOTH FEET: Primary | ICD-10-CM

## 2021-11-08 DIAGNOSIS — M20.5X1 ACQUIRED HALLUX LIMITUS OF BOTH FEET: Primary | ICD-10-CM

## 2021-11-08 PROCEDURE — 73630 X-RAY EXAM OF FOOT: CPT

## 2021-11-08 PROCEDURE — 99243 OFF/OP CNSLTJ NEW/EST LOW 30: CPT | Performed by: PODIATRIST

## 2021-11-14 DIAGNOSIS — M22.2X1 PATELLOFEMORAL PAIN SYNDROME OF RIGHT KNEE: ICD-10-CM

## 2021-11-14 DIAGNOSIS — M70.61 TROCHANTERIC BURSITIS OF RIGHT HIP: ICD-10-CM

## 2021-11-15 ENCOUNTER — OFFICE VISIT (OUTPATIENT)
Dept: PODIATRY | Facility: CLINIC | Age: 61
End: 2021-11-15
Payer: COMMERCIAL

## 2021-11-15 VITALS
WEIGHT: 195 LBS | HEART RATE: 70 BPM | SYSTOLIC BLOOD PRESSURE: 132 MMHG | DIASTOLIC BLOOD PRESSURE: 82 MMHG | BODY MASS INDEX: 34.54 KG/M2

## 2021-11-15 DIAGNOSIS — M20.5X1 ACQUIRED HALLUX LIMITUS OF BOTH FEET: Primary | ICD-10-CM

## 2021-11-15 DIAGNOSIS — M20.5X2 ACQUIRED HALLUX LIMITUS OF BOTH FEET: Primary | ICD-10-CM

## 2021-11-15 PROCEDURE — 99214 OFFICE O/P EST MOD 30 MIN: CPT | Performed by: PODIATRIST

## 2021-11-18 ENCOUNTER — OFFICE VISIT (OUTPATIENT)
Dept: GASTROENTEROLOGY | Facility: CLINIC | Age: 61
End: 2021-11-18
Payer: COMMERCIAL

## 2021-11-18 ENCOUNTER — TELEPHONE (OUTPATIENT)
Dept: GASTROENTEROLOGY | Facility: CLINIC | Age: 61
End: 2021-11-18

## 2021-11-18 VITALS
HEART RATE: 73 BPM | SYSTOLIC BLOOD PRESSURE: 118 MMHG | WEIGHT: 196.2 LBS | HEIGHT: 63 IN | DIASTOLIC BLOOD PRESSURE: 76 MMHG | BODY MASS INDEX: 34.76 KG/M2

## 2021-11-18 DIAGNOSIS — Z98.890 HISTORY OF COLONOSCOPY: ICD-10-CM

## 2021-11-18 DIAGNOSIS — Z79.01 LONG TERM CURRENT USE OF ANTICOAGULANT: Primary | ICD-10-CM

## 2021-11-18 DIAGNOSIS — Z12.11 SCREENING FOR COLON CANCER: Primary | ICD-10-CM

## 2021-11-18 PROCEDURE — 99213 OFFICE O/P EST LOW 20 MIN: CPT | Performed by: NURSE PRACTITIONER

## 2021-11-18 RX ORDER — SODIUM PICOSULFATE, MAGNESIUM OXIDE, AND ANHYDROUS CITRIC ACID 10; 3.5; 12 MG/160ML; G/160ML; G/160ML
LIQUID ORAL
Qty: 320 ML | Refills: 0 | Status: SHIPPED | OUTPATIENT
Start: 2021-11-18 | End: 2022-07-13 | Stop reason: ALTCHOICE

## 2021-11-19 ENCOUNTER — PREP FOR PROCEDURE (OUTPATIENT)
Dept: GASTROENTEROLOGY | Facility: CLINIC | Age: 61
End: 2021-11-19

## 2021-11-19 DIAGNOSIS — Z12.11 SCREENING FOR COLON CANCER: ICD-10-CM

## 2021-11-19 DIAGNOSIS — Z86.010 HISTORY OF COLON POLYPS: Primary | ICD-10-CM

## 2021-11-23 DIAGNOSIS — M22.2X1 PATELLOFEMORAL DISORDER OF RIGHT KNEE: ICD-10-CM

## 2021-12-07 ENCOUNTER — CONSULT (OUTPATIENT)
Dept: PAIN MEDICINE | Facility: CLINIC | Age: 61
End: 2021-12-07
Payer: COMMERCIAL

## 2021-12-07 ENCOUNTER — TELEPHONE (OUTPATIENT)
Dept: PODIATRY | Facility: CLINIC | Age: 61
End: 2021-12-07

## 2021-12-07 VITALS
DIASTOLIC BLOOD PRESSURE: 84 MMHG | HEIGHT: 63 IN | BODY MASS INDEX: 35.08 KG/M2 | WEIGHT: 198 LBS | SYSTOLIC BLOOD PRESSURE: 120 MMHG | TEMPERATURE: 97.3 F

## 2021-12-07 DIAGNOSIS — M54.50 CHRONIC RIGHT-SIDED LOW BACK PAIN WITHOUT SCIATICA: ICD-10-CM

## 2021-12-07 DIAGNOSIS — M25.461 EFFUSION OF RIGHT KNEE: ICD-10-CM

## 2021-12-07 DIAGNOSIS — M51.16 LUMBAR DISC DISEASE WITH RADICULOPATHY: Primary | ICD-10-CM

## 2021-12-07 DIAGNOSIS — G89.29 CHRONIC RIGHT-SIDED LOW BACK PAIN WITHOUT SCIATICA: ICD-10-CM

## 2021-12-07 DIAGNOSIS — M25.561 CHRONIC PAIN OF RIGHT KNEE: ICD-10-CM

## 2021-12-07 DIAGNOSIS — G89.29 CHRONIC PAIN OF RIGHT KNEE: ICD-10-CM

## 2021-12-07 DIAGNOSIS — Z79.01 CHRONIC ANTICOAGULATION: ICD-10-CM

## 2021-12-07 PROBLEM — M19.90 ARTHRITIS: Status: ACTIVE | Noted: 2021-12-07

## 2021-12-07 PROBLEM — R03.0 BORDERLINE BLOOD PRESSURE: Status: ACTIVE | Noted: 2021-12-07

## 2021-12-07 PROBLEM — I26.99 PULMONARY EMBOLISM (HCC): Status: ACTIVE | Noted: 2021-12-07

## 2021-12-07 PROBLEM — I82.409 DVT OF LEG (DEEP VENOUS THROMBOSIS) (HCC): Status: ACTIVE | Noted: 2021-12-07

## 2021-12-07 PROBLEM — M25.569 KNEE PAIN, ACUTE: Status: ACTIVE | Noted: 2021-12-07

## 2021-12-07 PROCEDURE — 99244 OFF/OP CNSLTJ NEW/EST MOD 40: CPT | Performed by: ANESTHESIOLOGY

## 2021-12-07 RX ORDER — DIAZEPAM 5 MG/1
TABLET ORAL
Qty: 4 TABLET | Refills: 0 | Status: SHIPPED | OUTPATIENT
Start: 2021-12-07 | End: 2022-03-25

## 2021-12-07 RX ORDER — PRENATAL VIT 91/IRON/FOLIC/DHA 28-975-200
COMBINATION PACKAGE (EA) ORAL
COMMUNITY
Start: 2021-11-26

## 2021-12-13 ENCOUNTER — HOSPITAL ENCOUNTER (OUTPATIENT)
Dept: GASTROENTEROLOGY | Facility: AMBULATORY SURGERY CENTER | Age: 61
Discharge: HOME | End: 2021-12-13

## 2021-12-14 ENCOUNTER — TELEPHONE (OUTPATIENT)
Dept: GASTROENTEROLOGY | Facility: CLINIC | Age: 61
End: 2021-12-14

## 2021-12-27 ENCOUNTER — APPOINTMENT (EMERGENCY)
Dept: CT IMAGING | Facility: HOSPITAL | Age: 61
DRG: 111 | End: 2021-12-27
Payer: COMMERCIAL

## 2021-12-27 ENCOUNTER — HOSPITAL ENCOUNTER (INPATIENT)
Facility: HOSPITAL | Age: 61
LOS: 1 days | Discharge: HOME | DRG: 111 | End: 2021-12-30
Attending: EMERGENCY MEDICINE | Admitting: STUDENT IN AN ORGANIZED HEALTH CARE EDUCATION/TRAINING PROGRAM
Payer: COMMERCIAL

## 2021-12-27 DIAGNOSIS — R07.89 ATYPICAL CHEST PAIN: ICD-10-CM

## 2021-12-27 DIAGNOSIS — R10.32 LLQ ABDOMINAL PAIN: ICD-10-CM

## 2021-12-27 DIAGNOSIS — R42 VERTIGO: Primary | ICD-10-CM

## 2021-12-27 DIAGNOSIS — J01.00 ACUTE NON-RECURRENT MAXILLARY SINUSITIS: ICD-10-CM

## 2021-12-27 DIAGNOSIS — K44.9 HIATAL HERNIA: ICD-10-CM

## 2021-12-27 LAB
ALBUMIN SERPL BCP-MCNC: 3.8 G/DL (ref 3.5–5)
ALP SERPL-CCNC: 56 U/L (ref 46–116)
ALT SERPL W P-5'-P-CCNC: 56 U/L (ref 12–78)
ANION GAP SERPL CALCULATED.3IONS-SCNC: 10 MMOL/L (ref 4–13)
APTT PPP: 28 SECONDS (ref 23–37)
AST SERPL W P-5'-P-CCNC: 24 U/L (ref 5–45)
ATRIAL RATE: 68 BPM
BASOPHILS # BLD AUTO: 0.04 THOUSANDS/ΜL (ref 0–0.1)
BASOPHILS NFR BLD AUTO: 0 % (ref 0–1)
BILIRUB SERPL-MCNC: 0.4 MG/DL (ref 0.2–1)
BUN SERPL-MCNC: 17 MG/DL (ref 5–25)
CALCIUM SERPL-MCNC: 9.1 MG/DL (ref 8.3–10.1)
CARDIAC TROPONIN I PNL SERPL HS: 2 NG/L
CHLORIDE SERPL-SCNC: 105 MMOL/L (ref 100–108)
CO2 SERPL-SCNC: 25 MMOL/L (ref 21–32)
CREAT SERPL-MCNC: 0.8 MG/DL (ref 0.6–1.3)
D DIMER PPP FEU-MCNC: 0.52 UG/ML FEU
EOSINOPHIL # BLD AUTO: 0.15 THOUSAND/ΜL (ref 0–0.61)
EOSINOPHIL NFR BLD AUTO: 2 % (ref 0–6)
ERYTHROCYTE [DISTWIDTH] IN BLOOD BY AUTOMATED COUNT: 13.2 % (ref 11.6–15.1)
FLUAV RNA RESP QL NAA+PROBE: NEGATIVE
FLUBV RNA RESP QL NAA+PROBE: NEGATIVE
GFR SERPL CREATININE-BSD FRML MDRD: 80 ML/MIN/1.73SQ M
GLUCOSE SERPL-MCNC: 126 MG/DL (ref 65–140)
HCT VFR BLD AUTO: 46.2 % (ref 34.8–46.1)
HGB BLD-MCNC: 14.9 G/DL (ref 11.5–15.4)
IMM GRANULOCYTES # BLD AUTO: 0.03 THOUSAND/UL (ref 0–0.2)
IMM GRANULOCYTES NFR BLD AUTO: 0 % (ref 0–2)
INR PPP: 0.9 (ref 0.84–1.19)
LACTATE SERPL-SCNC: 1.3 MMOL/L (ref 0.5–2)
LIPASE SERPL-CCNC: 88 U/L (ref 73–393)
LYMPHOCYTES # BLD AUTO: 3.2 THOUSANDS/ΜL (ref 0.6–4.47)
LYMPHOCYTES NFR BLD AUTO: 34 % (ref 14–44)
MCH RBC QN AUTO: 29.2 PG (ref 26.8–34.3)
MCHC RBC AUTO-ENTMCNC: 32.3 G/DL (ref 31.4–37.4)
MCV RBC AUTO: 91 FL (ref 82–98)
MONOCYTES # BLD AUTO: 0.77 THOUSAND/ΜL (ref 0.17–1.22)
MONOCYTES NFR BLD AUTO: 8 % (ref 4–12)
NEUTROPHILS # BLD AUTO: 5.11 THOUSANDS/ΜL (ref 1.85–7.62)
NEUTS SEG NFR BLD AUTO: 56 % (ref 43–75)
NRBC BLD AUTO-RTO: 0 /100 WBCS
NT-PROBNP SERPL-MCNC: 41 PG/ML
P AXIS: 47 DEGREES
PLATELET # BLD AUTO: 309 THOUSANDS/UL (ref 149–390)
PMV BLD AUTO: 9 FL (ref 8.9–12.7)
POTASSIUM SERPL-SCNC: 3.9 MMOL/L (ref 3.5–5.3)
PR INTERVAL: 152 MS
PROT SERPL-MCNC: 7.6 G/DL (ref 6.4–8.2)
PROTHROMBIN TIME: 12 SECONDS (ref 11.6–14.5)
QRS AXIS: 0 DEGREES
QRSD INTERVAL: 82 MS
QT INTERVAL: 406 MS
QTC INTERVAL: 431 MS
RBC # BLD AUTO: 5.1 MILLION/UL (ref 3.81–5.12)
RSV RNA RESP QL NAA+PROBE: NEGATIVE
SARS-COV-2 RNA RESP QL NAA+PROBE: NEGATIVE
SODIUM SERPL-SCNC: 140 MMOL/L (ref 136–145)
T WAVE AXIS: 2 DEGREES
VENTRICULAR RATE: 68 BPM
WBC # BLD AUTO: 9.3 THOUSAND/UL (ref 4.31–10.16)

## 2021-12-27 PROCEDURE — 80053 COMPREHEN METABOLIC PANEL: CPT | Performed by: EMERGENCY MEDICINE

## 2021-12-27 PROCEDURE — 99244 OFF/OP CNSLTJ NEW/EST MOD 40: CPT | Performed by: PSYCHIATRY & NEUROLOGY

## 2021-12-27 PROCEDURE — 96361 HYDRATE IV INFUSION ADD-ON: CPT

## 2021-12-27 PROCEDURE — 84484 ASSAY OF TROPONIN QUANT: CPT | Performed by: EMERGENCY MEDICINE

## 2021-12-27 PROCEDURE — 93010 ELECTROCARDIOGRAM REPORT: CPT | Performed by: INTERNAL MEDICINE

## 2021-12-27 PROCEDURE — 74174 CTA ABD&PLVS W/CONTRAST: CPT

## 2021-12-27 PROCEDURE — 0241U HB NFCT DS VIR RESP RNA 4 TRGT: CPT | Performed by: EMERGENCY MEDICINE

## 2021-12-27 PROCEDURE — 96374 THER/PROPH/DIAG INJ IV PUSH: CPT

## 2021-12-27 PROCEDURE — 99285 EMERGENCY DEPT VISIT HI MDM: CPT | Performed by: EMERGENCY MEDICINE

## 2021-12-27 PROCEDURE — 99285 EMERGENCY DEPT VISIT HI MDM: CPT

## 2021-12-27 PROCEDURE — 70450 CT HEAD/BRAIN W/O DYE: CPT

## 2021-12-27 PROCEDURE — G1004 CDSM NDSC: HCPCS

## 2021-12-27 PROCEDURE — 93005 ELECTROCARDIOGRAM TRACING: CPT

## 2021-12-27 PROCEDURE — 83690 ASSAY OF LIPASE: CPT | Performed by: EMERGENCY MEDICINE

## 2021-12-27 PROCEDURE — 83880 ASSAY OF NATRIURETIC PEPTIDE: CPT | Performed by: EMERGENCY MEDICINE

## 2021-12-27 PROCEDURE — 83605 ASSAY OF LACTIC ACID: CPT | Performed by: EMERGENCY MEDICINE

## 2021-12-27 PROCEDURE — 36415 COLL VENOUS BLD VENIPUNCTURE: CPT | Performed by: EMERGENCY MEDICINE

## 2021-12-27 PROCEDURE — 99220 PR INITIAL OBSERVATION CARE/DAY 70 MINUTES: CPT | Performed by: STUDENT IN AN ORGANIZED HEALTH CARE EDUCATION/TRAINING PROGRAM

## 2021-12-27 PROCEDURE — 96372 THER/PROPH/DIAG INJ SC/IM: CPT

## 2021-12-27 PROCEDURE — 85610 PROTHROMBIN TIME: CPT | Performed by: EMERGENCY MEDICINE

## 2021-12-27 PROCEDURE — 85025 COMPLETE CBC W/AUTO DIFF WBC: CPT | Performed by: EMERGENCY MEDICINE

## 2021-12-27 PROCEDURE — 85730 THROMBOPLASTIN TIME PARTIAL: CPT | Performed by: EMERGENCY MEDICINE

## 2021-12-27 PROCEDURE — 71275 CT ANGIOGRAPHY CHEST: CPT

## 2021-12-27 PROCEDURE — 85379 FIBRIN DEGRADATION QUANT: CPT | Performed by: EMERGENCY MEDICINE

## 2021-12-27 RX ORDER — SUCRALFATE 1 G/1
1 TABLET ORAL ONCE
Status: DISCONTINUED | OUTPATIENT
Start: 2021-12-27 | End: 2021-12-27

## 2021-12-27 RX ORDER — LOSARTAN POTASSIUM 25 MG/1
25 TABLET ORAL DAILY
Status: DISCONTINUED | OUTPATIENT
Start: 2021-12-27 | End: 2021-12-30 | Stop reason: HOSPADM

## 2021-12-27 RX ORDER — ACETAMINOPHEN 325 MG/1
650 TABLET ORAL EVERY 6 HOURS PRN
Status: DISCONTINUED | OUTPATIENT
Start: 2021-12-27 | End: 2021-12-30 | Stop reason: HOSPADM

## 2021-12-27 RX ORDER — MECLIZINE HCL 12.5 MG/1
25 TABLET ORAL EVERY 8 HOURS PRN
Status: DISCONTINUED | OUTPATIENT
Start: 2021-12-27 | End: 2021-12-27

## 2021-12-27 RX ORDER — DIAZEPAM 2 MG/1
1 TABLET ORAL ONCE
Status: COMPLETED | OUTPATIENT
Start: 2021-12-27 | End: 2021-12-27

## 2021-12-27 RX ORDER — MECLIZINE HCL 12.5 MG/1
25 TABLET ORAL EVERY 8 HOURS SCHEDULED
Status: DISCONTINUED | OUTPATIENT
Start: 2021-12-27 | End: 2021-12-30 | Stop reason: HOSPADM

## 2021-12-27 RX ORDER — DIAZEPAM 5 MG/ML
5 INJECTION, SOLUTION INTRAMUSCULAR; INTRAVENOUS EVERY 6 HOURS PRN
Status: DISCONTINUED | OUTPATIENT
Start: 2021-12-27 | End: 2021-12-30 | Stop reason: HOSPADM

## 2021-12-27 RX ORDER — PROMETHAZINE HYDROCHLORIDE 25 MG/ML
25 INJECTION, SOLUTION INTRAMUSCULAR; INTRAVENOUS ONCE
Status: COMPLETED | OUTPATIENT
Start: 2021-12-27 | End: 2021-12-27

## 2021-12-27 RX ORDER — MECLIZINE HCL 12.5 MG/1
50 TABLET ORAL ONCE
Status: COMPLETED | OUTPATIENT
Start: 2021-12-27 | End: 2021-12-27

## 2021-12-27 RX ORDER — SODIUM CHLORIDE 9 MG/ML
75 INJECTION, SOLUTION INTRAVENOUS CONTINUOUS
Status: DISCONTINUED | OUTPATIENT
Start: 2021-12-27 | End: 2021-12-30 | Stop reason: HOSPADM

## 2021-12-27 RX ORDER — ONDANSETRON 2 MG/ML
4 INJECTION INTRAMUSCULAR; INTRAVENOUS ONCE
Status: DISCONTINUED | OUTPATIENT
Start: 2021-12-27 | End: 2021-12-30 | Stop reason: HOSPADM

## 2021-12-27 RX ORDER — ONDANSETRON 2 MG/ML
4 INJECTION INTRAMUSCULAR; INTRAVENOUS EVERY 6 HOURS PRN
Status: DISCONTINUED | OUTPATIENT
Start: 2021-12-27 | End: 2021-12-30 | Stop reason: HOSPADM

## 2021-12-27 RX ADMIN — MECLIZINE 50 MG: 12.5 TABLET ORAL at 04:51

## 2021-12-27 RX ADMIN — PROMETHAZINE HYDROCHLORIDE 25 MG: 25 INJECTION INTRAMUSCULAR; INTRAVENOUS at 01:14

## 2021-12-27 RX ADMIN — SODIUM CHLORIDE 75 ML/HR: 0.9 INJECTION, SOLUTION INTRAVENOUS at 09:22

## 2021-12-27 RX ADMIN — RIVAROXABAN 20 MG: 10 TABLET, FILM COATED ORAL at 09:21

## 2021-12-27 RX ADMIN — SODIUM CHLORIDE 1000 ML: 0.9 INJECTION, SOLUTION INTRAVENOUS at 03:00

## 2021-12-27 RX ADMIN — DIAZEPAM 1 MG: 2 TABLET ORAL at 06:21

## 2021-12-27 RX ADMIN — ACETAMINOPHEN 650 MG: 325 TABLET, FILM COATED ORAL at 09:21

## 2021-12-27 RX ADMIN — FAMOTIDINE 20 MG: 10 INJECTION INTRAVENOUS at 04:49

## 2021-12-27 RX ADMIN — IOHEXOL 100 ML: 350 INJECTION, SOLUTION INTRAVENOUS at 02:20

## 2021-12-27 RX ADMIN — LOSARTAN POTASSIUM 25 MG: 25 TABLET, FILM COATED ORAL at 09:21

## 2021-12-27 RX ADMIN — MECLIZINE 25 MG: 12.5 TABLET ORAL at 14:37

## 2021-12-27 NOTE — ASSESSMENT & PLAN NOTE
Presents with pressure like sensation  Worse with breathing, movement or palpation  States pain radiates to back  CTA chest negative for dissection  EKG negative for STEMI  Trop 2  Most likely costochondritis 2/2 recent URI with coughing

## 2021-12-27 NOTE — ASSESSMENT & PLAN NOTE
Cardiac workup unremarkable in the ED  Continues to have mid sternal chest pressure radiating to the back

## 2021-12-27 NOTE — CONSULTS
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, status post IVC filter placement (not MRI compatible), sciatica, hypertension, presents with vertiginous dizziness and chest pressure  Patient had a URI last week and continues to cough, is COVID negative  She reports acute onset room spinning dizziness exacerbated by movement and ambulation, which began the afternoon of 12/26/2021  With horizontal nystagmus with leftward gaze, but otherwise nonfocal exam   Suspect post URI vestibular neuronitis but cannot completely rule out central cause  Unfortunately, patient unable to have MRI due to IVC filter  Patient also reports sensitivity/mild allergy to steroids  She reports no improvement after receiving meclizine 50 mg p o  x1 and Valium 1 mg p o  x1     Plan:  -consider repeat head CT on 12/28/21 if symptoms do not improve, or worsen  -continue Xarelto  Patient is allergic to aspirin   -will treat vertigo with meclizine 25 mg q 8 hours, and order Valium 5 mg IV p r n    -Zofran p r n  nausea  -PT/OT  -telemetry  Atypical chest pain  Assessment & Plan  Cardiac workup unremarkable in the ED  Continues to have mid sternal chest pressure radiating to the back  Pulmonary embolism ([address] )  Assessment & Plan  On lifelong Xarelto  Also has IVC filter, which patient reports is not MRI compatible  Recommendations for outpatient neurological follow up have yet to be determined  History of Present Illness     Reason for Consult / Principal Problem: Vertigo  Hx and PE limited by: n/a  HPI: Oskar Duong is a 62 y o  right handed female with 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, status post IVC filter placement (not MRI compatible), sciatica, hypertension, presents with vertiginous dizziness and chest pressure      Patient reports sudden onset of vertiginous dizziness which began the afternoon of 12/26/2021  She also noted central chest pressure radiating to her back concurrently  She notes when she moves her head or at times walk around the room spinning dizziness significantly worsens  At rest, patient does not feel quite right but is not acutely dizzy either  No history of vertigo in the past   Denies hearing deficit, tinnitus, ear fullness  She does feel nauseous and was vomiting in the ED  No lateralized weakness, numbness, or coordination issues  She recently had a URI and felt she was much better by 12/24/2021, though she continues to cough  COVID negative  She has been vaccinated but not boosted  Cardiac workup unremarkable in the ED  CT head on presentation demonstrated no acute changes  Blood pressure was 167/82  Patient is on Xarelto and is alert aspirin  She also notes she gets hives when she is treated with steroids, including fluticasone nasal spray  She had steroid injection of her knee which also caused a mild reaction  She received meclizine 50 mg x1 and Valium 1 mg x1 in the ED, with no improvement in her symptoms  On exam, patient has horizontal nystagmus with leftward gaze  No difficulty with coordination of the upper or lower extremities  Exam otherwise nonlateralizing  Inpatient consult to Neurology  Consult performed by: Kp Aguilar PA-C  Consult ordered by: Dimas Savage DO          Review of Systems   Constitutional: Negative  HENT: Negative  Eyes: Negative  Respiratory: Positive for cough  Cardiovascular: Positive for chest pain (pressure radiating to the back)  Gastrointestinal: Negative  Endocrine: Negative  Genitourinary: Negative  Musculoskeletal: Negative  Skin: Negative for rash  Allergic/Immunologic: Negative  Neurological: Positive for dizziness  As above  Hematological: Negative  Psychiatric/Behavioral: Negative  Historical Information   Past Medical History:   Diagnosis Date    History of DVT (deep vein thrombosis) 2013    History of pulmonary embolism 2013    Hypertension      Past Surgical History:   Procedure Laterality Date    BREAST MASS EXCISION       Social History   Social History     Substance and Sexual Activity   Alcohol Use Yes    Comment: socially     Social History     Substance and Sexual Activity   Drug Use Never     E-Cigarette/Vaping    E-Cigarette Use Never User      E-Cigarette/Vaping Substances    Nicotine No     THC No     CBD No     Flavoring No     Other No     Unknown No      Social History     Tobacco Use   Smoking Status Never Smoker   Smokeless Tobacco Never Used     Family History:   Family History   Problem Relation Age of Onset    Deep vein thrombosis Father     No Known Problems Sister     Breast cancer Brother     Colon cancer Neg Hx     Colon polyps Neg Hx        Review of previous medical records was completed  Meds/Allergies   PTA meds:   Prior to Admission Medications   Prescriptions Last Dose Informant Patient Reported? Taking? Diclofenac Sodium (VOLTAREN) 1 %  Self No No   Sig: Apply 2 g topically 4 (four) times a day   Diclofenac Sodium (VOLTAREN) 1 %   No No   Sig: apply 2 grams to affected area four times a day   Sod Picosulfate-Mag Ox-Cit Acd (Clenpiq) 10-3 5-12 MG-GM -GM/160ML SOLN   No No   Sig: Take as directed     XARELTO 20 MG tablet  Self Yes No   diazepam (VALIUM) 5 mg tablet   No No   Sig: Take one tablet 1 hour prior to that the procedure may take an additional tablet 30 minutes prior to procedure, needs  day of procedure   losartan (COZAAR) 25 mg tablet  Self Yes No   terbinafine (LamISIL) 1 % cream   Yes No   Sig: APPLY TOPICALLY TWICE A DAY AS NEEDED FOR RASH; USE ON ARMS FOR ONE WEEK      Facility-Administered Medications: None       Allergies   Allergen Reactions    Aspirin     Fluticasone        Objective   Vitals:Blood pressure 139/73, pulse 74, temperature 98 1 °F (36 7 °C), temperature source Temporal, resp  rate 18, height 5' 3" (1 6 m), weight 86 2 kg (190 lb), SpO2 96 %  ,Body mass index is 33 66 kg/m²  No intake or output data in the 24 hours ending 12/27/21 1549    Invasive Devices: Invasive Devices  Report    Peripheral Intravenous Line            Peripheral IV 12/27/21 Right Antecubital <1 day                Physical Exam   General:  Patient is well-developed, obese BMI 33 66, and in no acute distress  HEENT:  Head normocephalic  Eyes anicteric  Conjunctival injection noted bilaterally, with some conjunctival edema noted on the left  Cardiovascular:  With regular rhythm  Lungs:  Normal effort  Nonlabored breathing  Extremities:  With no significant edema  Skin: No rashes  Neurologic Exam  Neurologic:  Patient is alert, pleasantly interactive, and appropriately conversational   No obvious symbolic language difficulty or dysarthria, and the patient is fully oriented  Gait deferred for safety  Cranial Nerves:   II: Visual fields full to confrontation  Pupils equal, round, reactive to light with normal accomodation  Cannot visualize optic fundi  III,IV,VI: extraocular movements intact with horizontal nystagmus noted with leftward gaze  V: Sensation in the V1 through V3 distributions intact to light touch bilaterally  VII: Face is symmetric with no weakness noted  VIII: Audition intact to finger rub bilaterally  IX/X: Uvula midline  Soft palate elevation symmetric  XII: Tongue midline with no atrophy or fasciculations with appropriate movement  Coordination:  Accurate with finger-to-nose and heel-to-shin maneuvers bilaterally  Motor testing with no upper or lower extremity drift  Full strength noted throughout  Sensory testing grossly intact to light touch throughout  Reflexes 2+ bilateral upper extremities, 2 bilateral knees, 2 bilateral ankles      Toe responses are downgoing bilaterally  Lab Results:   CBC:   Results from last 7 days   Lab Units 12/27/21  0119   WBC Thousand/uL 9 30   RBC Million/uL 5 10   HEMOGLOBIN g/dL 14 9   HEMATOCRIT % 46 2*   MCV fL 91   PLATELETS Thousands/uL 309   , BMP/CMP:   Results from last 7 days   Lab Units 12/27/21  0119   SODIUM mmol/L 140   POTASSIUM mmol/L 3 9   CHLORIDE mmol/L 105   CO2 mmol/L 25   BUN mg/dL 17   CREATININE mg/dL 0 80   CALCIUM mg/dL 9 1   AST U/L 24   ALT U/L 56   ALK PHOS U/L 56   EGFR ml/min/1 73sq m 80     Imaging Studies: I have personally reviewed pertinent reports  and I have personally reviewed pertinent films in PACS [facility]  EKG, Pathology, and Other Studies: I have personally reviewed pertinent reports      VTE Prophylaxis: Xarelto    Code Status: Level 1 - Full Code  Advance Directive and Living Will:      Power of :    POLST:

## 2021-12-27 NOTE — H&P
JARON Guajardo 54 1960, 62 y o  female MRN: [id number]  Unit/Bed#: Z2 H1 Encounter: [id number]  Primary Care Provider: Araceli Chery DO   Date and time admitted to hospital: 12/27/2021  1:11 AM    * Vertigo  Assessment & Plan  Presents with vertigo symptoms for last 24 hours  Describes it as room spinning sensation  Admits getting over a recent URI  Never had similar symptoms in past  CT head negative  No improvement with zofran, meclezine, IVF, and valium  Will consult neuro    Atypical chest pain  Assessment & Plan  Presents with pressure like sensation  Worse with breathing, movement or palpation  States pain radiates to back  CTA chest negative for dissection  EKG negative for STEMI  Trop 2  Most likely costochondritis 2/2 recent URI with coughing    Hypertension  Assessment & Plan  Resume lisinopril    Pulmonary embolism (HCC)  Assessment & Plan  Hx of pe/dvt s/p ivc filter  Resume xeralto      VTE Pharmacologic Prophylaxis: VTE Score: 6 High Risk (Score >/= 5) - Pharmacological DVT Prophylaxis Ordered: rivaroxaban (Xarelto)  Sequential Compression Devices Ordered  Code Status: Level 1 - Full Code   Discussion with family: Updated  (daughter) via phone  Anticipated Length of Stay: Patient will be admitted on an observation basis with an anticipated length of stay of less than 2 midnights secondary to vertigo  Total Time for Visit, including Counseling / Coordination of Care: 45 minutes Greater than 50% of this total time spent on direct patient counseling and coordination of care  Chief Complaint: dizziness    History of Present Illness:  Va Desai is a 62 y o  female with a PMH of DVT/PE on lifelong AC s/p IVC filter who presents with dizziness since yesterday  Pt describes it as a room spinning sensation  Worse with any type of movement  No alleviating factors  Pt received IVF, Zofran, phenergan, meclizine and valium   CT head negative for acute findings  Pt also complains of substernal chest pain  States that it radiates to back  Worse with movement, breathing, or palpation  CTA negative for dissection  Troponin 2 on admission  EKG NSR  Pt states she recently is getting over a URI  Review of Systems:  Review of Systems   Cardiovascular: Positive for chest pain  Neurological: Positive for dizziness  All other systems reviewed and are negative  Past Medical and Surgical History:   Past Medical History:   Diagnosis Date    History of DVT (deep vein thrombosis) 2013    History of pulmonary embolism 2013    Hypertension        Past Surgical History:   Procedure Laterality Date    BREAST MASS EXCISION         Meds/Allergies:  Prior to Admission medications    Medication Sig Start Date End Date Taking? Authorizing Provider   diazepam (VALIUM) 5 mg tablet Take one tablet 1 hour prior to that the procedure may take an additional tablet 30 minutes prior to procedure, needs  day of procedure 12/7/21   Loren Goel,    Diclofenac Sodium (VOLTAREN) 1 % Apply 2 g topically 4 (four) times a day 6/7/21   Malcolm Knott III, DO   Diclofenac Sodium (VOLTAREN) 1 % apply 2 grams to affected area four times a day 11/23/21   Eddy Betancourt PA-C   losartan (COZAAR) 25 mg tablet  12/1/18   Historical Provider, MD   Sod Picosulfate-Mag Ox-Cit Acd (Clenpiq) 10-3 5-12 MG-GM -GM/160ML SOLN Take as directed  11/18/21   GIL Singh   terbinafine (LamISIL) 1 % cream APPLY TOPICALLY TWICE A DAY AS NEEDED FOR RASH; USE ON ARMS FOR ONE WEEK 11/26/21   Historical Provider, MD John Aloe 20 MG tablet  11/17/18   Historical Provider, MD   cephalexin (KEFLEX) 500 mg capsule TAKE 1 CAPSULE BY MOUTH EVERY 6 HOURS FOR 5 DAYS  Patient not taking: Reported on 10/27/2021 8/11/21 12/27/21  Historical Provider, MD     I have reviewed home medications with patient personally  Allergies:    Allergies   Allergen Reactions    Aspirin  Fluticasone        Social History:  Marital Status: Single     Patient Pre-hospital Living Situation: Home  Patient Pre-hospital Level of Mobility: walks  Patient Pre-hospital Diet Restrictions: n/a  Substance Use History:   Social History     Substance and Sexual Activity   Alcohol Use Yes    Comment: socially     Social History     Tobacco Use   Smoking Status Never Smoker   Smokeless Tobacco Never Used     Social History     Substance and Sexual Activity   Drug Use Never       Family History:  Family History   Problem Relation Age of Onset    Deep vein thrombosis Father     No Known Problems Sister     Breast cancer Brother     Colon cancer Neg Hx     Colon polyps Neg Hx        Physical Exam:     Vitals:   Blood Pressure: 167/82 (12/27/21 0059)  Pulse: 68 (12/27/21 0059)  Temperature: 97 8 °F (36 6 °C) (12/27/21 0059)  Temp Source: Temporal (12/27/21 0059)  Respirations: 18 (12/27/21 0059)  Height: 5' 3" (160 cm) (12/27/21 0059)  Weight - Scale: 86 2 kg (190 lb) (12/27/21 0059)  SpO2: 97 % (12/27/21 0059)    Physical Exam  Vitals reviewed  HENT:      Head: Normocephalic and atraumatic  Right Ear: External ear normal       Left Ear: External ear normal       Nose: Nose normal       Mouth/Throat:      Mouth: Mucous membranes are moist       Pharynx: Oropharynx is clear  Eyes:      Extraocular Movements: Extraocular movements intact  Cardiovascular:      Rate and Rhythm: Normal rate and regular rhythm  Pulses: Normal pulses  Heart sounds: Normal heart sounds  Pulmonary:      Effort: Pulmonary effort is normal       Breath sounds: Normal breath sounds  Abdominal:      General: Abdomen is flat  Palpations: Abdomen is soft  Tenderness: There is no abdominal tenderness  Musculoskeletal:         General: Normal range of motion  Cervical back: Normal range of motion  Skin:     General: Skin is warm and dry  Neurological:      General: No focal deficit present  Mental Status: She is alert  Psychiatric:         Mood and Affect: Mood normal           Additional Data:     Lab Results:  Results from last 7 days   Lab Units 12/27/21  0119   WBC Thousand/uL 9 30   HEMOGLOBIN g/dL 14 9   HEMATOCRIT % 46 2*   PLATELETS Thousands/uL 309   NEUTROS PCT % 56   LYMPHS PCT % 34   MONOS PCT % 8   EOS PCT % 2     Results from last 7 days   Lab Units 12/27/21  0119   SODIUM mmol/L 140   POTASSIUM mmol/L 3 9   CHLORIDE mmol/L 105   CO2 mmol/L 25   BUN mg/dL 17   CREATININE mg/dL 0 80   ANION GAP mmol/L 10   CALCIUM mg/dL 9 1   ALBUMIN g/dL 3 8   TOTAL BILIRUBIN mg/dL 0 40   ALK PHOS U/L 56   ALT U/L 56   AST U/L 24   GLUCOSE RANDOM mg/dL 126     Results from last 7 days   Lab Units 12/27/21  0119   INR  0 90             Results from last 7 days   Lab Units 12/27/21  0119   LACTIC ACID mmol/L 1 3       Imaging: Reviewed radiology reports from this admission including: chest CT scan and CT head  CTA dissection protocol chest/abdomen/pelvis   Final Result by Tonny Frederick MD (12/27 1258)      1  No aortic dissection or aneurysm  2   Small hiatal hernia  Workstation performed: [id number]         CT head without contrast   Final Result by Tonny Frederick MD (12/27 1052)      No acute intracranial hemorrhage, midline shift, or mass effect  Workstation performed: [id number]             EKG and Other Studies Reviewed on Admission:   · EKG: NSR  HR 68     ** Please Note: This note has been constructed using a voice recognition system   **

## 2021-12-27 NOTE — ED PROVIDER NOTES
History  Chief Complaint   Patient presents with    Chest Pain     Pt c/o of chest pressure and dizzness      61 yo F with PMH of PE/DVT on xarelto, HTN presents to ED with acute onset of CP, N/V and diaphoresis, started this afternoon while at rest  CP radiates to back  Also has had some lower abd pain for past few days  No urinary complaints  No diarrhea/constipation  No focal numbness/tingling/weakness  No HA or visual changes  No syncope  No tinnitus  Has dizziness which is worse with standing/sitting, described as room spinning  No similar in past  Compliant with xarelto, did not take PM dose yet  History provided by:  Patient, medical records and EMS personnel   used: No    Chest Pain  Pain location:  Substernal area  Pain quality: pressure    Pain radiates to:  Upper back  Pain radiates to the back: yes    Pain severity:  Moderate  Onset quality:  Sudden  Timing:  Constant  Progression:  Worsening  Associated symptoms: abdominal pain, diaphoresis, dizziness, nausea and vomiting    Associated symptoms: no back pain, no cough, no dysphagia, no fatigue, no fever, no headache, no palpitations and no shortness of breath        Prior to Admission Medications   Prescriptions Last Dose Informant Patient Reported? Taking? Diclofenac Sodium (VOLTAREN) 1 %  Self No No   Sig: Apply 2 g topically 4 (four) times a day   Diclofenac Sodium (VOLTAREN) 1 %   No No   Sig: apply 2 grams to affected area four times a day   Sod Picosulfate-Mag Ox-Cit Acd (Clenpiq) 10-3 5-12 MG-GM -GM/160ML SOLN   No No   Sig: Take as directed     XARELTO 20 MG tablet  Self Yes No   cephalexin (KEFLEX) 500 mg capsule  Self Yes No   Sig: TAKE 1 CAPSULE BY MOUTH EVERY 6 HOURS FOR 5 DAYS   Patient not taking: Reported on 10/27/2021   diazepam (VALIUM) 5 mg tablet   No No   Sig: Take one tablet 1 hour prior to that the procedure may take an additional tablet 30 minutes prior to procedure, needs  day of procedure losartan (COZAAR) 25 mg tablet  Self Yes No   terbinafine (LamISIL) 1 % cream   Yes No   Sig: APPLY TOPICALLY TWICE A DAY AS NEEDED FOR RASH; USE ON ARMS FOR ONE WEEK      Facility-Administered Medications: None       Past Medical History:   Diagnosis Date    History of DVT (deep vein thrombosis) 2013    History of pulmonary embolism 2013    Hypertension        Past Surgical History:   Procedure Laterality Date    BREAST MASS EXCISION         Family History   Problem Relation Age of Onset    Deep vein thrombosis Father     No Known Problems Sister     Breast cancer Brother     Colon cancer Neg Hx     Colon polyps Neg Hx      I have reviewed and agree with the history as documented  E-Cigarette/Vaping    E-Cigarette Use Never User      E-Cigarette/Vaping Substances    Nicotine No     THC No     CBD No     Flavoring No     Other No     Unknown No      Social History     Tobacco Use    Smoking status: Never Smoker    Smokeless tobacco: Never Used   Vaping Use    Vaping Use: Never used   Substance Use Topics    Alcohol use: Yes     Comment: socially    Drug use: Never       Review of Systems   Constitutional: Positive for diaphoresis  Negative for appetite change, chills, fatigue and fever  HENT: Negative for congestion, ear pain, rhinorrhea, sore throat, trouble swallowing and voice change  Eyes: Negative for pain and visual disturbance  Respiratory: Negative for cough, chest tightness and shortness of breath  Cardiovascular: Positive for chest pain  Negative for palpitations and leg swelling  Gastrointestinal: Positive for abdominal pain, nausea and vomiting  Negative for blood in stool, constipation and diarrhea  Genitourinary: Negative for difficulty urinating and hematuria  Musculoskeletal: Negative for back pain, neck pain and neck stiffness  Skin: Negative for rash  Neurological: Positive for dizziness   Negative for syncope, speech difficulty, light-headedness and headaches  Psychiatric/Behavioral: Negative for confusion and suicidal ideas  Physical Exam  Physical Exam  Vitals and nursing note reviewed  Constitutional:       General: She is not in acute distress  Appearance: She is well-developed  She is ill-appearing and diaphoretic  Comments: Actively vomiting   HENT:      Head: Normocephalic and atraumatic  Right Ear: External ear normal       Left Ear: External ear normal       Nose: Nose normal    Eyes:      General: No scleral icterus  Right eye: No discharge  Left eye: No discharge  Extraocular Movements: Extraocular movements intact  Conjunctiva/sclera: Conjunctivae normal       Pupils: Pupils are equal, round, and reactive to light  Neck:      Vascular: No JVD  Trachea: No tracheal deviation  Cardiovascular:      Rate and Rhythm: Normal rate and regular rhythm  Pulses:           Radial pulses are 2+ on the right side and 2+ on the left side  Heart sounds: Normal heart sounds  No murmur heard  No friction rub  No gallop  Pulmonary:      Effort: Pulmonary effort is normal  No respiratory distress  Breath sounds: Normal breath sounds  No stridor  Chest:      Chest wall: No tenderness  Abdominal:      General: Bowel sounds are normal       Palpations: Abdomen is soft  Tenderness: There is no abdominal tenderness  There is no guarding or rebound  Musculoskeletal:         General: No deformity  Normal range of motion  Cervical back: Normal range of motion and neck supple  Right lower leg: No tenderness  No edema  Left lower leg: No tenderness  No edema  Lymphadenopathy:      Cervical: No cervical adenopathy  Skin:     General: Skin is warm  Coloration: Skin is pale  Findings: No rash  Neurological:      General: No focal deficit present  Mental Status: She is alert and oriented to person, place, and time  Cranial Nerves: No cranial nerve deficit  Sensory: No sensory deficit  Coordination: Coordination normal    Psychiatric:         Behavior: Behavior normal          Vital Signs  ED Triage Vitals [12/27/21 0059]   Temperature Pulse Respirations Blood Pressure SpO2   97 8 °F (36 6 °C) 68 18 167/82 97 %      Temp Source Heart Rate Source Patient Position - Orthostatic VS BP Location FiO2 (%)   Temporal -- -- -- --      Pain Score       --           Vitals:    12/27/21 0059   BP: 167/82   Pulse: 68         Visual Acuity      ED Medications  Medications   ondansetron (ZOFRAN) injection 4 mg (has no administration in time range)   sodium chloride 0 9 % bolus 1,000 mL (1,000 mL Intravenous New Bag 12/27/21 0300)   promethazine (PHENERGAN) injection 25 mg (25 mg Intramuscular Given 12/27/21 0114)   iohexol (OMNIPAQUE) 350 MG/ML injection (SINGLE-DOSE) 100 mL (100 mL Intravenous Given 12/27/21 0220)   famotidine (PEPCID) injection 20 mg (20 mg Intravenous Given 12/27/21 0449)   meclizine (ANTIVERT) tablet 50 mg (50 mg Oral Given 12/27/21 0451)   diazepam (VALIUM) tablet 1 mg (1 mg Oral Given 12/27/21 0737)       Diagnostic Studies  Results Reviewed     Procedure Component Value Units Date/Time    COVID/FLU/RSV - 2 hour TAT [[id number]]  (Normal) Collected: 12/27/21 0124    Lab Status: Final result Specimen: Nares from Nose Updated: 12/27/21 0228     SARS-CoV-2 Negative     INFLUENZA A PCR Negative     INFLUENZA B PCR Negative     RSV PCR Negative    Narrative:      FOR PEDIATRIC PATIENTS - copy/paste COVID Guidelines URL to browser: [email or web address]     This test has been authorized by FDA under an EUA (Emergency Use Assay) for use by authorized laboratories  Clinical caution and judgement should be used with the interpretation of these results with consideration of the clinical impression and other laboratory testing    Testing reported as "Positive" or "Negative" has been proven to be accurate according to standard laboratory validation requirements  All testing is performed with control materials showing appropriate reactivity at standard intervals      HS Troponin 0hr (reflex protocol) [[id number]]  (Normal) Collected: 12/27/21 0119    Lab Status: Final result Specimen: Blood from Line, Venous Updated: 12/27/21 0158     hs TnI 0hr 2 ng/L     Lipase [[id number]]  (Normal) Collected: 12/27/21 0119    Lab Status: Final result Specimen: Blood from Line, Venous Updated: 12/27/21 0157     Lipase 88 u/L     D-Dimer [[id number]]  (Abnormal) Collected: 12/27/21 0119    Lab Status: Final result Specimen: Blood from Line, Venous Updated: 12/27/21 0157     D-Dimer, Quant 0 52 ug/ml FEU     NT-BNP PRO [[id number]]  (Normal) Collected: 12/27/21 0119    Lab Status: Final result Specimen: Blood from Line, Venous Updated: 12/27/21 0157     NT-proBNP 41 pg/mL     Comprehensive metabolic panel [[id number]] Collected: 12/27/21 0119    Lab Status: Final result Specimen: Blood from Line, Venous Updated: 12/27/21 0156     Sodium 140 mmol/L      Potassium 3 9 mmol/L      Chloride 105 mmol/L      CO2 25 mmol/L      ANION GAP 10 mmol/L      BUN 17 mg/dL      Creatinine 0 80 mg/dL      Glucose 126 mg/dL      Calcium 9 1 mg/dL      AST 24 U/L      ALT 56 U/L      Alkaline Phosphatase 56 U/L      Total Protein 7 6 g/dL      Albumin 3 8 g/dL      Total Bilirubin 0 40 mg/dL      eGFR 80 ml/min/1 73sq m     Narrative:      Eloise guidelines for Chronic Kidney Disease (CKD):     Stage 1 with normal or high GFR (GFR > 90 mL/min/1 73 square meters)    Stage 2 Mild CKD (GFR = 60-89 mL/min/1 73 square meters)    Stage 3A Moderate CKD (GFR = 45-59 mL/min/1 73 square meters)    Stage 3B Moderate CKD (GFR = 30-44 mL/min/1 73 square meters)    Stage 4 Severe CKD (GFR = 15-29 mL/min/1 73 square meters)    Stage 5 End Stage CKD (GFR <15 mL/min/1 73 square meters)  Note: GFR calculation is accurate only with a steady state creatinine    Lactic acid [[id number]]  (Normal) Collected: 12/27/21 0119    Lab Status: Final result Specimen: Blood from Line, Venous Updated: 12/27/21 0153     LACTIC ACID 1 3 mmol/L     Narrative:      Result may be elevated if tourniquet was used during collection  Protime-INR [[id number]]  (Normal) Collected: 12/27/21 0119    Lab Status: Final result Specimen: Blood from Line, Venous Updated: 12/27/21 0151     Protime 12 0 seconds      INR 0 90    APTT [[id number]]  (Normal) Collected: 12/27/21 0119    Lab Status: Final result Specimen: Blood from Line, Venous Updated: 12/27/21 0151     PTT 28 seconds     CBC and differential [[id number]]  (Abnormal) Collected: 12/27/21 0119    Lab Status: Final result Specimen: Blood from Line, Venous Updated: 12/27/21 0133     WBC 9 30 Thousand/uL      RBC 5 10 Million/uL      Hemoglobin 14 9 g/dL      Hematocrit 46 2 %      MCV 91 fL      MCH 29 2 pg      MCHC 32 3 g/dL      RDW 13 2 %      MPV 9 0 fL      Platelets 642 Thousands/uL      nRBC 0 /100 WBCs      Neutrophils Relative 56 %      Immat GRANS % 0 %      Lymphocytes Relative 34 %      Monocytes Relative 8 %      Eosinophils Relative 2 %      Basophils Relative 0 %      Neutrophils Absolute 5 11 Thousands/µL      Immature Grans Absolute 0 03 Thousand/uL      Lymphocytes Absolute 3 20 Thousands/µL      Monocytes Absolute 0 77 Thousand/µL      Eosinophils Absolute 0 15 Thousand/µL      Basophils Absolute 0 04 Thousands/µL                  CTA dissection protocol chest/abdomen/pelvis   Final Result by Alvin Hare MD (12/27 9976)      1  No aortic dissection or aneurysm  2   Small hiatal hernia  Workstation performed: [id number]         CT head without contrast   Final Result by Alvin Hare MD (12/27 1607)      No acute intracranial hemorrhage, midline shift, or mass effect                    Workstation performed: [id number]                    Procedures  ECG 12 Lead Documentation Only    Date/Time: 12/27/2021 1:14 AM  Performed by: Gisele Gandara MD  Authorized by: Gisele Gandara MD     Indications / Diagnosis:  Cp  ECG reviewed by me, the ED Provider: yes    Patient location:  ED  Previous ECG:     Previous ECG:  Unavailable    Comparison to cardiac monitor: Yes    Interpretation:     Interpretation: normal    Rate:     ECG rate:  68    ECG rate assessment: normal    Rhythm:     Rhythm: sinus rhythm    Ectopy:     Ectopy: none    QRS:     QRS axis:  Normal    QRS intervals:  Normal  Conduction:     Conduction: normal    ST segments:     ST segments:  Normal  T waves:     T waves: normal               ED Course  ED Course as of 12/27/21 0648   Mon Dec 27, 2021   0158 Dimer noted - normal when adjusted for age  0409 Cts neg for acute findings, other than hiatal hernia  0410 Will trial pepcid/carafate and reassess  0425 Discussed w/ pt  She still is sx as far as dizziness  I suspect vertigo  Will trial meclizine  0558 Pt feeling improved  Will ambulate    [id number] Pt unable to ambulate  Valium ordered  2010 Pt with persistent sx  CP and nausea are resolved, but is still very vertiginous despite meds  Will keep for obs                                                MDM  Number of Diagnoses or Management Options  Atypical chest pain: new and requires workup  Hiatal hernia: new and requires workup  Vertigo: new and requires workup     Amount and/or Complexity of Data Reviewed  Clinical lab tests: ordered and reviewed  Tests in the radiology section of CPT®: ordered and reviewed  Tests in the medicine section of CPT®: ordered and reviewed  Review and summarize past medical records: yes  Discuss the patient with other providers: yes  Independent visualization of images, tracings, or specimens: yes    Risk of Complications, Morbidity, and/or Mortality  Presenting problems: moderate  Diagnostic procedures: low  Management options: low    Patient Progress  Patient progress: stable      Disposition  Final diagnoses:   Atypical chest pain   Vertigo   Hiatal hernia     Time reflects when diagnosis was documented in both MDM as applicable and the Disposition within this note     Time User Action Codes Description Comment    12/27/2021  6:47 AM Eldonna Adas S Add [R07 89] Atypical chest pain     12/27/2021  6:47 AM Eldonna Adas S Add [R42] Vertigo     12/27/2021  6:47 AM Vivia Constable Add [K44 9] Hiatal hernia       ED Disposition     ED Disposition Condition Date/Time Comment    Admit Stable Mon Dec 27, 2021  6:47 AM Case was discussed with Alyssa Ngo and the patient's admission status was agreed to be Admission Status: observation status to the service of Dr Mingo Dutta   Follow-up Information    None         Patient's Medications   Discharge Prescriptions    No medications on file       No discharge procedures on file      PDMP Review       Value Time User    PDMP Reviewed  Yes 12/7/2021 11:24 AM Den Ramon DO          ED Provider  Electronically Signed by           Eusebio Stoddard MD  12/27/21 9977

## 2021-12-27 NOTE — ASSESSMENT & PLAN NOTE
16-year-old female with 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, status post IVC filter placement (not MRI compatible), sciatica, hypertension, presents with vertiginous dizziness and chest pressure  Patient had a URI last week and continues to cough, is COVID negative  She reports acute onset room spinning dizziness exacerbated by movement and ambulation, which began the afternoon of 12/26/2021  With horizontal nystagmus with leftward gaze, but otherwise nonfocal exam   Suspect post URI vestibular neuronitis but cannot completely rule out central cause  Unfortunately, patient unable to have MRI due to IVC filter  Repeat CT of head is pending  Patient also reports sensitivity/mild allergy to steroids  She reports no improvement after receiving meclizine 50 mg p o  x1 and Valium 1 mg p o  x1     Plan:  -repeat CT of head is pending    -continue Xarelto  Patient is allergic to aspirin   -will treat vertigo with meclizine 25 mg q 8 hours, and Valium 5 mg IV p r n  , would not utilize these long term  -Zofran p r n  nausea  -PT/OT    -telemetry   -consider out patient vestibular therapy, ENT

## 2021-12-27 NOTE — ASSESSMENT & PLAN NOTE
Presents with vertigo symptoms for last 24 hours  Describes it as room spinning sensation  Admits getting over a recent URI  Never had similar symptoms in past  CT head negative  No improvement with zofran, meclezine, IVF, and valium  Will consult neuro

## 2021-12-28 ENCOUNTER — APPOINTMENT (OUTPATIENT)
Dept: CT IMAGING | Facility: HOSPITAL | Age: 61
DRG: 111 | End: 2021-12-28
Payer: COMMERCIAL

## 2021-12-28 PROBLEM — J01.00 ACUTE NON-RECURRENT MAXILLARY SINUSITIS: Status: ACTIVE | Noted: 2021-12-28

## 2021-12-28 PROBLEM — R10.32 LLQ ABDOMINAL PAIN: Status: ACTIVE | Noted: 2021-12-28

## 2021-12-28 LAB
ALBUMIN SERPL BCP-MCNC: 3.2 G/DL (ref 3.5–5)
ALP SERPL-CCNC: 47 U/L (ref 46–116)
ALT SERPL W P-5'-P-CCNC: 39 U/L (ref 12–78)
ANION GAP SERPL CALCULATED.3IONS-SCNC: 6 MMOL/L (ref 4–13)
AST SERPL W P-5'-P-CCNC: 13 U/L (ref 5–45)
BILIRUB SERPL-MCNC: 0.5 MG/DL (ref 0.2–1)
BUN SERPL-MCNC: 13 MG/DL (ref 5–25)
CALCIUM ALBUM COR SERPL-MCNC: 8.9 MG/DL (ref 8.3–10.1)
CALCIUM SERPL-MCNC: 8.3 MG/DL (ref 8.3–10.1)
CHLORIDE SERPL-SCNC: 106 MMOL/L (ref 100–108)
CO2 SERPL-SCNC: 26 MMOL/L (ref 21–32)
CREAT SERPL-MCNC: 0.68 MG/DL (ref 0.6–1.3)
ERYTHROCYTE [DISTWIDTH] IN BLOOD BY AUTOMATED COUNT: 13.6 % (ref 11.6–15.1)
GFR SERPL CREATININE-BSD FRML MDRD: 95 ML/MIN/1.73SQ M
GLUCOSE P FAST SERPL-MCNC: 93 MG/DL (ref 65–99)
GLUCOSE SERPL-MCNC: 93 MG/DL (ref 65–140)
HCT VFR BLD AUTO: 42.9 % (ref 34.8–46.1)
HGB BLD-MCNC: 13.7 G/DL (ref 11.5–15.4)
MAGNESIUM SERPL-MCNC: 2 MG/DL (ref 1.6–2.6)
MCH RBC QN AUTO: 29.6 PG (ref 26.8–34.3)
MCHC RBC AUTO-ENTMCNC: 31.9 G/DL (ref 31.4–37.4)
MCV RBC AUTO: 93 FL (ref 82–98)
PHOSPHATE SERPL-MCNC: 3.4 MG/DL (ref 2.3–4.1)
PLATELET # BLD AUTO: 224 THOUSANDS/UL (ref 149–390)
PMV BLD AUTO: 9 FL (ref 8.9–12.7)
POTASSIUM SERPL-SCNC: 3.9 MMOL/L (ref 3.5–5.3)
PROT SERPL-MCNC: 6.3 G/DL (ref 6.4–8.2)
RBC # BLD AUTO: 4.63 MILLION/UL (ref 3.81–5.12)
SODIUM SERPL-SCNC: 138 MMOL/L (ref 136–145)
WBC # BLD AUTO: 8.74 THOUSAND/UL (ref 4.31–10.16)

## 2021-12-28 PROCEDURE — 97116 GAIT TRAINING THERAPY: CPT

## 2021-12-28 PROCEDURE — 36415 COLL VENOUS BLD VENIPUNCTURE: CPT | Performed by: STUDENT IN AN ORGANIZED HEALTH CARE EDUCATION/TRAINING PROGRAM

## 2021-12-28 PROCEDURE — 85027 COMPLETE CBC AUTOMATED: CPT | Performed by: STUDENT IN AN ORGANIZED HEALTH CARE EDUCATION/TRAINING PROGRAM

## 2021-12-28 PROCEDURE — 83735 ASSAY OF MAGNESIUM: CPT | Performed by: STUDENT IN AN ORGANIZED HEALTH CARE EDUCATION/TRAINING PROGRAM

## 2021-12-28 PROCEDURE — 99214 OFFICE O/P EST MOD 30 MIN: CPT | Performed by: PSYCHIATRY & NEUROLOGY

## 2021-12-28 PROCEDURE — 70450 CT HEAD/BRAIN W/O DYE: CPT

## 2021-12-28 PROCEDURE — 84100 ASSAY OF PHOSPHORUS: CPT | Performed by: STUDENT IN AN ORGANIZED HEALTH CARE EDUCATION/TRAINING PROGRAM

## 2021-12-28 PROCEDURE — 99225 PR SBSQ OBSERVATION CARE/DAY 25 MINUTES: CPT | Performed by: STUDENT IN AN ORGANIZED HEALTH CARE EDUCATION/TRAINING PROGRAM

## 2021-12-28 PROCEDURE — G1004 CDSM NDSC: HCPCS

## 2021-12-28 PROCEDURE — 74176 CT ABD & PELVIS W/O CONTRAST: CPT

## 2021-12-28 PROCEDURE — 97112 NEUROMUSCULAR REEDUCATION: CPT

## 2021-12-28 PROCEDURE — 97163 PT EVAL HIGH COMPLEX 45 MIN: CPT

## 2021-12-28 PROCEDURE — 97165 OT EVAL LOW COMPLEX 30 MIN: CPT

## 2021-12-28 PROCEDURE — 80053 COMPREHEN METABOLIC PANEL: CPT | Performed by: STUDENT IN AN ORGANIZED HEALTH CARE EDUCATION/TRAINING PROGRAM

## 2021-12-28 RX ORDER — AMOXICILLIN AND CLAVULANATE POTASSIUM 875; 125 MG/1; MG/1
1 TABLET, FILM COATED ORAL EVERY 12 HOURS SCHEDULED
Status: DISCONTINUED | OUTPATIENT
Start: 2021-12-28 | End: 2021-12-28

## 2021-12-28 RX ORDER — AMOXICILLIN AND CLAVULANATE POTASSIUM 875; 125 MG/1; MG/1
1 TABLET, FILM COATED ORAL EVERY 12 HOURS SCHEDULED
Status: DISCONTINUED | OUTPATIENT
Start: 2021-12-28 | End: 2021-12-30 | Stop reason: HOSPADM

## 2021-12-28 RX ADMIN — IOHEXOL 50 ML: 240 INJECTION, SOLUTION INTRATHECAL; INTRAVASCULAR; INTRAVENOUS; ORAL at 12:06

## 2021-12-28 RX ADMIN — MECLIZINE 25 MG: 12.5 TABLET ORAL at 01:02

## 2021-12-28 RX ADMIN — SODIUM CHLORIDE 75 ML/HR: 0.9 INJECTION, SOLUTION INTRAVENOUS at 19:46

## 2021-12-28 RX ADMIN — AMOXICILLIN AND CLAVULANATE POTASSIUM 1 TABLET: 875; 125 TABLET, FILM COATED ORAL at 15:15

## 2021-12-28 RX ADMIN — MECLIZINE 25 MG: 12.5 TABLET ORAL at 21:50

## 2021-12-28 RX ADMIN — MECLIZINE 25 MG: 12.5 TABLET ORAL at 13:37

## 2021-12-28 RX ADMIN — MECLIZINE 25 MG: 12.5 TABLET ORAL at 07:17

## 2021-12-28 RX ADMIN — RIVAROXABAN 20 MG: 10 TABLET, FILM COATED ORAL at 07:16

## 2021-12-28 RX ADMIN — ONDANSETRON 4 MG: 2 INJECTION INTRAMUSCULAR; INTRAVENOUS at 08:45

## 2021-12-28 RX ADMIN — ACETAMINOPHEN 650 MG: 325 TABLET, FILM COATED ORAL at 08:44

## 2021-12-28 NOTE — PHYSICAL THERAPY NOTE
PHYSICAL THERAPY NOTE     12/28/21 0905   PT Last Visit   PT Visit Date 12/28/21   Note Type   Note type Evaluation   Pain Assessment   Pain Assessment Tool 0-10   Pain Score 5   Pain Location/Orientation Location: Head  (LLQ (a couple weeks, "tender"))   Restrictions/Precautions   Weight Bearing Precautions Per Order No   Home Living   Type of [facility] Two level;1/2 bath on main level; Able to live on main level with bedroom/bathroom  (1 EDE)   Bathroom Shower/Tub Tub/shower unit   Prior Function   Level of [place] Independent with ADLs and functional mobility   Lives With Son  (25years old)   Receives Help From Family   ADL Assistance Independent   IADLs Independent   Falls in the last 6 months 0   Vocational   (stopped work in April 2/2 back and knee pain)   Comments son works during the day;  pt is home alone during the day   General   Additional Pertinent History Pt with c/o LLQ pain x 2 weeks;  LUE numbness and tingling with discoloration when BP taken;  continued Headache and sciatica from being on stretcher in ED  Dr [place] aware of symptoms  Family/Caregiver Present No   Cognition   Overall Cognitive Status WFL   Arousal/Participation Alert   Orientation Level Oriented X4   Memory Within functional limits   Following Commands Follows all commands and directions without difficulty   Subjective   Subjective BP supine in bed: 162/86mmHg;  Sitting EOB:  161/95mmHg;  standing:     RUE Assessment   RUE Assessment WFL   LUE Assessment   LUE Assessment WFL   RLE Assessment   RLE Assessment WFL   LLE Assessment   LLE Assessment WFL   Vestibular   Vestibular Comments smooth pursuits WFL, head thrust WFL, convergence WFL, saccades WFL; [place] (+) on L for subjective mixed c/o room spinning and light headed, no noted nustagmus  Completed Epley maneuver to treat;  minimal resolution in symptoms  Coordination   Movements are Fluid and Coordinated 1   Sensation WFL  (except LUE/hand numbness when BP taken)   Bed Mobility   Supine to Sit 6  Modified independent   Additional items Increased time required  (log roll)   Sit to Supine 6  Modified independent   Transfers   Sit to Stand 6  Modified independent   Additional items Increased time required  (RW)   Stand to Sit 6  Modified independent   Ambulation/Elevation   Gait pattern Decreased foot clearance;Decreased R stance;Decreased L stance   Gait Assistance 5  Supervision   Additional items Assist x 1   Assistive Device Rolling walker   Distance 50ft with RW, slow rashaun, decreased step lengths;  guarded steps with no head turn/scanning  Balance   Static Sitting Fair   Dynamic Sitting Fair   Static Standing Fair   Dynamic Standing Fair   Ambulatory Fair   Activity Tolerance   Activity Tolerance   (light headed/dizzy)   Medical Staff Made Aware Dr Silvina Baez   Assessment   Prognosis Fair   Problem List Decreased endurance; Impaired balance;Decreased mobility; Impaired sensation   Assessment Pt is a 62 y o  female who presented to ED with c/o chest pressure and dizziness, nausea, vomiting, diaphoresis  Personal factors affecting pt at time of IE include: home alone during the day, multilevel environment, requires AD for safe mobility  Prior to admission, pt was independent w/ all functional mobility w/ out AD, ambulated community distances and elevations, lived in multi-level home and lived with son;  Does shop/drive/cook/clean  Upon evaluation: Pt mod I for bed mobility, mod I for sit to stand, and S for short distance ambulation with RW  Full objective findings from PT assessment regarding body systems outlined above  Current limitations include impaired balance,decreased activity tolerance The following objective measures performed on IE also reveal limitations: (-) orthostatic BP measurements as above;  BPPV assessment as above   Pt's clinical presentation is currently unstable/unpredictable seen in pt's presentation of continuous monitoring, fall risk, and unpredictable balance deficits secondary to mixed c/o light headed and room spinning dizziness  Pt would benefit from continued PT while in hospital and follow up with OPPT at D/C to address BPPV as appropriate and continue to increase independence and decrease need for AD to return to PLOF, independent without AD  The patient's AM-[facility] Basic Mobility Inpatient Short Form Raw Score is 24  A Raw score of greater than 16 suggests the patient may benefit from discharge to home with use of RW for all mobility and OPPT  Please also refer to the recommendation of the Physical Therapist for safe discharge planning  Barriers to Discharge Decreased caregiver support; Inaccessible home environment   Goals   Patient Goals to figure out what is going on   STG Expiration Date 01/11/22   Short Term Goal #1 1   mod I to ambulate 150ft without AD, able to scan environment and gait speed < 7m/s;  2   mod I to ascend/descend 5 steps with handrail;  3   mod I to perform Epley maneuver as needed to treat vertigo symptoms  Plan   Treatment/Interventions Elevations; Therapeutic exercise;Patient/family training; Endurance training;Equipment eval/education; Bed mobility;Gait training; Compensatory technique education;Continued evaluation;Spoke to nursing;Spoke to case management  (spoke to DO)   PT Frequency 3-5x/wk   Recommendation   PT Discharge Recommendation Home with outpatient rehabilitation  (needs RW;  OP vestibular therapy)   AM-[facility] Basic Mobility Inpatient   Turning in Bed Without Bedrails 4   Lying on Back to Sitting on Edge of Flat Bed 4   Moving Bed to Chair 4   Standing Up From Chair 4   Walk in Room 4   Climb 3-5 Stairs 4   Basic Mobility Inpatient Raw Score 24   Basic Mobility Standardized Score 57 68   Highest Level Of Mobility   -[facility] Goal 8: Walk 250 feet or more   -M Highest Level of Mobility 7: Walk 25 feet or more   -M Goal Achieved No     Onetha MIKEY Kelley      Patient Name: Shahana Chaney  PLXKV'C Date: 12/28/2021

## 2021-12-28 NOTE — PLAN OF CARE
Problem: OCCUPATIONAL THERAPY ADULT  Goal: Performs self-care activities at highest level of function for planned discharge setting  See evaluation for individualized goals  Description: Outcome: Progressing  Note: Limitation: Decreased ADL status  Prognosis: Good  Assessment: Pt is a 62 y o  female seen for OT evaluation at [address] [place] [address], admitted 12/27/2021 w/ Vertigo  OT completed brief review of pt's medical and social history  Comorbidities affecting pt's functional performance at time of assessment include: hx PE & DVT (12/2021), HTN, chest pain, arthritis  Personal factors affecting pt at time of IE include:difficulty performing ADLS and difficulty performing IADLS   Prior to admission, pt was living in [place], [place], with adult son and was independent with ADL/IADL  Upon evaluation, pt presents to OT below baseline due to the following performance deficits: decreased balance and decreased tolerance  Pt's vertigo is limiting safety with higher level ADL/IADL  Pt to benefit from continued skilled OT tx while in the hospital to address deficits as defined above and maximize level of functional independence w ADL's and functional mobility  Occupational Performance areas to address include: bathing/shower, dressing, functional mobility and IADLs  The patient's raw score on the AM-PAC Daily Activity inpatient short form is 22, standardized score is 47 1, greater than 39 4  Patients at this level are likely to benefit from DC to home  Based on findings, pt is of low complexity  At this time, OT recommendations at time of discharge are return home with family support, pending progress       OT Discharge Recommendation: No rehabilitation needs

## 2021-12-28 NOTE — PROGRESS NOTES
[place]  Progress Note - [facility] [place] 1960, 62 y o  female MRN: [id number]  Unit/Bed#: -01 Encounter: [id number]  Primary Care Provider: Susu Harrison DO   Date and time admitted to hospital: 12/27/2021  1:11 AM    * Vertigo  Assessment & Plan  Presents with vertigo symptoms for last 24 hours  Describes it as room spinning sensation  Admits getting over a recent URI  Never had similar symptoms in past  CT head negative  No improvement with zofran, meclezine, IVF, and valium  Neurology consulted, appreciate recommendations  Continue to treat vertigo with meclizine 25 mg q 8 hours, and Valium 5 mg IV p r n  , would not utilize these long term  Repeat CT head this AM showed - No acute intracranial abnormality  Hopefully plan to discharge tomorrow if patient feeling better, (Plan on d/c with prn meclizine and zofran)  Will need out patient vestibular therapy, ENT     Atypical chest pain  Assessment & Plan  Presents with pressure like sensation  Worse with breathing, movement or palpation  States pain radiates to back  CTA chest negative for dissection  EKG negative for St changes  Trop 2  Most likely costochondritis 2/2 recent URI with coughing    Acute non-recurrent maxillary sinusitis  Assessment & Plan  CT head showed - Right maxillary sinus mucosal thickening with some dependent fluid consistent with acute on chronic right maxillary sinusitis  Scattered mucosal thickening seen elsewhere in the sphenoid sinus and ethmoid air cells  Admits to having maxillary sinus pressure, pt is getting over recent URI  Will start on augmentin 875-125 for 7 days        LLQ abdominal pain  Assessment & Plan  Admits to intermittent LLQ abdominal pain described as sharp in nature  F/U CT abdomen    Hypertension  Assessment & Plan  Resume lisinopril    Pulmonary embolism (HCC)  Assessment & Plan  Hx of pe/dvt s/p ivc filter  Resume xeralto        VTE Pharmacologic Prophylaxis: VTE Score: 6 High Risk (Score >/= 5) - Pharmacological DVT Prophylaxis Ordered: rivaroxaban (Xarelto)  Sequential Compression Devices Ordered  Patient Centered Rounds: I performed bedside rounds with nursing staff today  Discussions with Specialists or Other Care Team Provider: neuro    Education and Discussions with Family / Patient: Updated  (daughter) via phone  Time Spent for Care: 30 minutes  More than 50% of total time spent on counseling and coordination of care as described above  Current Length of Stay: 0 day(s)  Current Patient Status: Observation   Certification Statement: The patient will continue to require additional inpatient hospital stay due to vertigo  Discharge Plan: Anticipate discharge tomorrow to home  Code Status: Level 1 - Full Code    Subjective:   Patient seen examined at bedside  No acute events overnight  Patient admits to still having dizziness  Patient also admits to having intermittent sharp left lower quadrant abdominal pain  Objective:     Vitals:   Temp (24hrs), Av 3 °F (36 8 °C), Min:98 2 °F (36 8 °C), Max:98 3 °F (36 8 °C)    Temp:  [98 2 °F (36 8 °C)-98 3 °F (36 8 °C)] 98 3 °F (36 8 °C)  HR:  [75-86] 81  Resp:  [16-18] 18  BP: (130-165)/(64-86) 130/66  SpO2:  [94 %-97 %] 94 %  Body mass index is 33 66 kg/m²  Input and Output Summary (last 24 hours): Intake/Output Summary (Last 24 hours) at 2021 1442  Last data filed at 2021 1125  Gross per 24 hour   Intake --   Output 500 ml   Net -500 ml       Physical Exam:   Physical Exam  Vitals reviewed  HENT:      Head: Normocephalic and atraumatic  Right Ear: External ear normal       Left Ear: External ear normal       Nose: Nose normal       Mouth/Throat:      Mouth: Mucous membranes are moist       Pharynx: Oropharynx is clear  Eyes:      Extraocular Movements: Extraocular movements intact  Cardiovascular:      Rate and Rhythm: Normal rate and regular rhythm        Pulses: Normal pulses  Heart sounds: Normal heart sounds  Pulmonary:      Effort: Pulmonary effort is normal       Breath sounds: Normal breath sounds  Abdominal:      General: Abdomen is flat  Palpations: Abdomen is soft  Tenderness: There is no abdominal tenderness  Musculoskeletal:         General: Normal range of motion  Cervical back: Normal range of motion  Skin:     General: Skin is warm and dry  Neurological:      General: No focal deficit present  Mental Status: She is alert  Mental status is at baseline  Psychiatric:         Mood and Affect: Mood normal           Additional Data:     Labs:  Results from last 7 days   Lab Units 12/28/21  0718 12/27/21  0119 12/27/21  0119   WBC Thousand/uL 8 74   < > 9 30   HEMOGLOBIN g/dL 13 7   < > 14 9   HEMATOCRIT % 42 9   < > 46 2*   PLATELETS Thousands/uL 224   < > 309   NEUTROS PCT %  --   --  56   LYMPHS PCT %  --   --  34   MONOS PCT %  --   --  8   EOS PCT %  --   --  2    < > = values in this interval not displayed       Results from last 7 days   Lab Units 12/28/21  0718   SODIUM mmol/L 138   POTASSIUM mmol/L 3 9   CHLORIDE mmol/L 106   CO2 mmol/L 26   BUN mg/dL 13   CREATININE mg/dL 0 68   ANION GAP mmol/L 6   CALCIUM mg/dL 8 3   ALBUMIN g/dL 3 2*   TOTAL BILIRUBIN mg/dL 0 50   ALK PHOS U/L 47   ALT U/L 39   AST U/L 13   GLUCOSE RANDOM mg/dL 93     Results from last 7 days   Lab Units 12/27/21  0119   INR  0 90             Results from last 7 days   Lab Units 12/27/21  0119   LACTIC ACID mmol/L 1 3       Lines/Drains:  Invasive Devices  Report    Peripheral Intravenous Line            Peripheral IV 12/27/21 Right Antecubital 1 day                  Telemetry:  Telemetry Orders (From admission, onward)             48 Hour Telemetry Monitoring  (ED Bridging Orders Panel)  Continuous x 48 hours        References:    Telemetry Guidelines   Question:  Reason for 48 Hour Telemetry  Answer:  Acute MI, chest pain - R/O MI, or unstable angina Telemetry Reviewed: no data on tele monitor as patient was moved from ER this morning  Indication for Continued Telemetry Use: No indication for continued use  Will discontinue  Imaging: Reviewed radiology reports from this admission including: CT head    Recent Cultures (last 7 days):         Last 24 Hours Medication List:   Current Facility-Administered Medications   Medication Dose Route Frequency Provider Last Rate    acetaminophen  650 mg Oral Q6H PRN Pandi Todhe, DO      amoxicillin-clavulanate  1 tablet Oral Q12H [facility] Pandi Todhe, DO      diazepam  5 mg Intravenous Q6H PRN Koko Jackson PA-C      losartan  25 mg Oral Daily Pandi Todhe, DO      meclizine  25 mg Oral Q8H [facility] Nicole Hayward PA-C      ondansetron  4 mg Intravenous Once Jeanie Sage MD      ondansetron  4 mg Intravenous Q6H PRN Pandi Todhe, DO      rivaroxaban  20 mg Oral Daily With Breakfast Pandi Todhe, DO      sodium chloride  75 mL/hr Intravenous Continuous Pandi Todhe, DO 75 mL/hr (12/27/21 4305)        Today, Patient Was Seen By: Abel Cardoza DO    **Please Note: This note may have been constructed using a voice recognition system  **

## 2021-12-28 NOTE — UTILIZATION REVIEW
Initial Clinical Review  OBSERVATION  [email or web address] FOR VERTIGO CONVERTED TO INPATIENT ADMISSION 12/29/21 @1253 DUE TO CONTINUED STAY REQUIRED TO EVALUATE AND TREAT PATIENT WITH NEW RECTUS SHEATH HEMATOMA WITH LAB AND VITALS MONITORING  Admission: Date/Time/Statement:   Admission Orders (From admission, onward)     Ordered        12/29/21 1253  Inpatient Admission  Once            12/27/21 0648  Place in Observation  Once                      Orders Placed This Encounter   Procedures    Inpatient Admission     Standing Status:   Standing     Number of Occurrences:   1     Order Specific Question:   Level of Care     Answer:   Med Surg [16]     Order Specific Question:   Estimated length of stay     Answer:   More than 2 Midnights     Order Specific Question:   Certification     Answer:   I certify that inpatient services are medically necessary for this patient for a duration of greater than two midnights  See H&P and MD Progress Notes for additional information about the patient's course of treatment  ED Arrival Information     Expected Arrival Acuity    - 12/27/2021 00:47 Emergent         Means of arrival Escorted by Service Admission type    Ambulance [facility] Hospitalist Emergency         Arrival complaint    Chest Pain        Chief Complaint   Patient presents with    Chest Pain     Pt c/o of chest pressure and dizzness        Initial Presentation: 62 y o  female with a PMH of DVT/PE on lifelong AC s/p IVC filter who presents to ED from home via EMS with dizziness since yesterday  Pt describes it as a room spinning sensation, worse with any type of movement  No alleviating factors  Pt also reports substernal chest pain that radiates to her back and worse with movement, breathing, palpation  On exam, normal heart and lung sounds, no focal neuro deficit  Pt vomited in ED, diaphoretic  CT head negative for acute findings  CTA negative for dissection  Troponin 2 on admission  EKG NSR   Pt states she recently is getting over a URI  Pt received IVF, Zofran, phenergan, meclizine and valium in ED w/o improvement  Pt admitted as OBS to telemetry with vertigo, atypical chest pain-likely costochondritis  Plan - telemetry, neuro consult, scheduled meclizine  Resume Xarelto and lisinopril - home meds  Neurology-Suspecting possible vestibular neuronitis versus BPPV  On exam she feels spinning upon turning towards the left and there is horizontal nystagmus towards leftward gaze  This has a strong suspicion for BPPV presentation despite the recent URI which could have also been causing a vestibular neuronitis  Plan - around the clock meclizine, prn valium, Zofran prn  Date: 12/28  Medicine-Pt continues to have vertigo symptoms, will order repeat CT head per neuro recommendations  Neurology-Suspect post URI vestibular neuronitis but cannot completely rule out central cause  Patient unable to have MRI due to IVC filter  Repeat CT of head is pending  Reports sensitivity/mild allergy to steroids  She reports no improvement after receiving meclizine 50 mg p o  x1 and Valium 1 mg p o  x1  Pt reports persistent vertigo, alittle better than when it started  Reports worse of vertigo with head movement - upward and downward, and left to right    Nystagmus left sided improved from yesterday (slight today)  Pt able to ambulate w/ assist  Plan - continue Xarelto, treat vertigo with meclizine 25 mg q 8 hours, and Valium 5 mg IV p r n  , would not utilize these long term  Zofran prn, continue telemetry, Consider outpt vestibular therapy, ENT  Date 12/29  Medicine-repeat CT head 12/28 shows no acute intracranial abnormality, shows acute on chronic r sinus maxillary sinusitis  Pt ordered po Augmentin 12/28  Referral for outpt vestibular therapy made  General surgery consult placed 2/2 CT A/P findings of  L rectus sheath hematoma in pt on AC   CTA/P done due to L sided abdominal pain  Pt states dizziness improved, admits to intermittent LLQ abdominal pain  Per nursing notes, pt continues with vertigo, dizziness  AAO x4  Telemetry - sinus rhythm  general surgery- 4 5 x 2 5 cm rectus sheath hematoma with asymetric enlargement of the left rectus muscle  CTA done 1 day prior does not show extravasation or hematoma  but does have enlargement of left rectus muscle  Hgb and vitals stable  Plan -Abdominal binder, hold AC x5 days(pt received xarelto yesterday and today ), no surgical intervention planned  Monitor Hgb and vitals  ED Triage Vitals   Temperature Pulse Respirations Blood Pressure SpO2   12/27/21 0059 12/27/21 0059 12/27/21 0059 12/27/21 0059 12/27/21 0059   97 8 °F (36 6 °C) 68 18 167/82 97 %      Temp Source Heart Rate Source Patient Position - Orthostatic VS BP Location FiO2 (%)   12/27/21 0059 12/27/21 0920 12/27/21 0920 12/27/21 0920 --   Temporal Monitor Lying Left arm       Pain Score       12/27/21 0920       5          Wt Readings from Last 1 Encounters:   12/29/21 89 1 kg (196 lb 6 9 oz)     Additional Vital Signs:   Date/Time Temp Pulse Resp BP MAP (mmHg) SpO2   12/29/21 0758 -- -- -- -- -- 95 %   12/29/21 07:28:41 98 °F (36 7 °C) -- 18 124/74 91 --   12/28/21 14:36:53 98 3 °F (36 8 °C) 81 18 130/66 87 94 %     Date/Time Temp Pulse Resp BP MAP (mmHg) SpO2   12/28/21 08:34:51 98 2 °F (36 8 °C) -- 18 165/85 112 --   12/28/21 0700 -- 75 18 131/86 -- 96 %   12/27/21 2045 -- 86 16 138/64 -- 97 %   12/27/21 1953 -- -- -- -- -- --   12/27/21 1907 -- 82 18 136/74 -- 96 %   12/27/21 1435 98 1 °F (36 7 °C) 74 18 139/73 100 96 %   12/27/21 1047 -- 76 18 147/74 -- 96 %   12/27/21 0920 -- 73 18 166/89 -- 96 %     Date and Time Eye Opening Best Verbal Response Best Motor Response [place] Coma Scale Score   12/27/21 1951 4 5 6 15     Pertinent Labs/Diagnostic Test Results:   12/27   CT head-No acute intracranial hemorrhage, midline shift, or mass effect  CTA Chaest abdomen pelvis- dissection protocol-1    No aortic dissection or aneurysm  2   Small hiatal hernia  ECG-Normal sinus rhythm  12/28   CT head-No acute intracranial abnormality    Right maxillary sinus mucosal thickening with some dependent fluid consistent with acute on chronic right maxillary sinusitis  Scattered mucosal thickening seen elsewhere in the sphenoid sinus and ethmoid air cells  CT Abdomen-4 5 cm x 2 5 cm acute left rectus sheath hematoma associated asymmetric enlargement of the left rectus muscle, new from the previous study    Need for additional imaging to evaluate for active extravasation based on clinical evaluation    Bibasilar density seen, new from the previous study compatible with atelectasis      Results from last 7 days   Lab Units 12/27/21  0124   SARS-COV-2  Negative     Results from last 7 days   Lab Units 12/29/21  1156 12/28/21  0718 12/27/21  0119   WBC Thousand/uL  --  8 74 9 30   HEMOGLOBIN g/dL 13 9 13 7 14 9   HEMATOCRIT % 43 3 42 9 46 2*   PLATELETS Thousands/uL  --  224 309   NEUTROS ABS Thousands/µL  --   --  5 11         Results from last 7 days   Lab Units 12/28/21  0718 12/27/21  0119   SODIUM mmol/L 138 140   POTASSIUM mmol/L 3 9 3 9   CHLORIDE mmol/L 106 105   CO2 mmol/L 26 25   ANION GAP mmol/L 6 10   BUN mg/dL 13 17   CREATININE mg/dL 0 68 0 80   EGFR ml/min/1 73sq m 95 80   CALCIUM mg/dL 8 3 9 1   MAGNESIUM mg/dL 2 0  --    PHOSPHORUS mg/dL 3 4  --      Results from last 7 days   Lab Units 12/28/21  0718 12/27/21  0119   AST U/L 13 24   ALT U/L 39 56   ALK PHOS U/L 47 56   TOTAL PROTEIN g/dL 6 3* 7 6   ALBUMIN g/dL 3 2* 3 8   TOTAL BILIRUBIN mg/dL 0 50 0 40         Results from last 7 days   Lab Units 12/28/21  0718 12/27/21  0119   GLUCOSE RANDOM mg/dL 93 126               Results from last 7 days   Lab Units 12/27/21  0119   HS TNI 0HR ng/L 2     Results from last 7 days   Lab Units 12/27/21  0119   D-DIMER QUANTITATIVE ug/ml FEU 0 52*     Results from last 7 days   Lab Units 12/27/21  0119   PROTIME seconds 12 0   INR  0 90   PTT seconds 28             Results from last 7 days   Lab Units 12/27/21  0119   LACTIC ACID mmol/L 1 3             Results from last 7 days   Lab Units 12/27/21  0119   NT-PRO BNP pg/mL 41             Results from last 7 days   Lab Units 12/27/21  0119   LIPASE u/L 88           Results from last 7 days   Lab Units 12/27/21  0124   INFLUENZA A PCR  Negative   INFLUENZA B PCR  Negative   RSV PCR  Negative             ED Treatment:   Medication Administration from 12/27/2021 0047 to 12/28/2021 0827       Date/Time Order Dose Route Action     12/27/2021 0300 sodium chloride 0 9 % bolus 1,000 mL 1,000 mL Intravenous New Bag     12/27/2021 0114 promethazine (PHENERGAN) injection 25 mg 25 mg Intramuscular Given     12/27/2021 0220 iohexol (OMNIPAQUE) 350 MG/ML injection (SINGLE-DOSE) 100 mL 100 mL Intravenous Given     12/27/2021 0449 famotidine (PEPCID) injection 20 mg 20 mg Intravenous Given     12/27/2021 0451 meclizine (ANTIVERT) tablet 50 mg 50 mg Oral Given     12/27/2021 9588 diazepam (VALIUM) tablet 1 mg 1 mg Oral Given     12/27/2021 0921 losartan (COZAAR) tablet 25 mg 25 mg Oral Given     12/28/2021 0716 rivaroxaban (XARELTO) tablet 20 mg 20 mg Oral Given     12/27/2021 4812 rivaroxaban (XARELTO) tablet 20 mg 20 mg Oral Given     12/27/2021 5937 acetaminophen (TYLENOL) tablet 650 mg 650 mg Oral Given     12/27/2021 9014 sodium chloride 0 9 % infusion 75 mL/hr Intravenous New Bag     12/28/2021 0717 meclizine (ANTIVERT) tablet 25 mg 25 mg Oral Given     12/28/2021 0102 meclizine (ANTIVERT) tablet 25 mg 25 mg Oral Given     12/27/2021 1437 meclizine (ANTIVERT) tablet 25 mg 25 mg Oral Given        Past Medical History:   Diagnosis Date    History of DVT (deep vein thrombosis) 2013    History of pulmonary embolism 2013    Hypertension      Present on Admission:   Pulmonary embolism ([address] )   Hypertension   Atypical chest pain   Vertigo   Acute non-recurrent maxillary sinusitis      Admitting Diagnosis: Hiatal hernia [K44 9]  Vertigo [R42]  Chest pain [R07 9]  Atypical chest pain [R07 89]  Age/Sex: 62 y o  female  Admission Orders:  Scheduled Medications:  losartan, 25 mg, Oral, Daily  meclizine, 25 mg, Oral, Q8H ELLYN  ondansetron, 4 mg, Intravenous, Once  rivaroxaban, 20 mg, Oral, Daily With Breakfast- End: 12/29/21 1244  amoxicillin-clavulanate (AUGMENTIN) 875-125 mg per tablet 1 tablet  Dose: 1 tablet  Freq: Every 12 hours scheduled Route: PO  Start: 12/28/21 2230     Continuous IV Infusions:  sodium chloride, 75 mL/hr, Intravenous, Continuous      PRN Meds:  acetaminophen, 650 mg, Oral, Q6H PRN x1 12/28  diazepam, 5 mg, Intravenous, Q6H PRN  ondansetron, 4 mg, Intravenous, Q6H PRN x1 12/28    telemetry  Ortho BP x1  OOB  SCD's      IP CONSULT TO NEUROLOGY  IP CONSULT TO CASE MANAGEMENT  IP CONSULT TO ACUTE CARE SURGERY    Network Utilization Review Department  ATTENTION: Please call with any questions or concerns to [phone] and carefully listen to the prompts so that you are directed to the right person  All voicemails are confidential   Abbie Bee all requests for admission clinical reviews, approved or denied determinations and any other requests to dedicated fax number below belonging to the campus where the patient is receiving treatment   List of dedicated fax numbers for the Facilities:  [address] DENIALS (Administrative/Medical Necessity) [phone]   [address] [place] [address] (Maternity/NICU/Pediatrics) 261 [facility],[address]  [address] [facility] [place] [address] [address] [place] [address] [address] [facility] [phone]

## 2021-12-28 NOTE — ED NOTES
Ambulated pt to bathroom with minimal assistance, pt was feeling slightly dizzy, but "Feeling much better " pt walked back from the bathroom on her own without assistance       Andrew Fernández RN  12/28/21 3879

## 2021-12-28 NOTE — OCCUPATIONAL THERAPY NOTE
Occupational Therapy Evaluation      Felecia Steinberg    12/28/2021    Principal Problem:    Vertigo  Active Problems:    Pulmonary embolism (HCC)    Hypertension    Atypical chest pain      Past Medical History:   Diagnosis Date    History of DVT (deep vein thrombosis) 2013    History of pulmonary embolism 2013    Hypertension        Past Surgical History:   Procedure Laterality Date    BREAST MASS EXCISION          12/28/21 0938   OT Last Visit   OT Visit Date 12/28/21   Note Type   Note type Evaluation   Restrictions/Precautions   Weight Bearing Precautions Per Order No   Other Precautions Fall Risk  (vertigo)   Pain Assessment   Pain Assessment Tool 0-10   Pain Score 5   Pain Location/Orientation Location: Head   Multiple Pain Sites Yes   Pain 2   Jefferson-Hodgson FACES Pain Rating 2 8   Pain Location/Orientation 2 Orientation: Left;Orientation: Lower; Location: Abdomen  (Reports sharp LLQ pain x2 weeks)   Home Living   Type of [address] [place] [address] Two level;1/2 bath on main level; Able to live on main level with bedroom/bathroom  (1STE)   Bathroom Shower/Tub Tub/shower unit   Prior Function   Level of [place] Independent with ADLs and functional mobility   Lives With Son  (17YO- works during the day)   Tomas Hassan 33 in the last 6 months 0   Vocational   (recently stopped working 2* pain)   ADL   Eating Assistance 7  Independent   Grooming Assistance 6  [address] 5  Supervision/Setup   LB Bathing Assistance 5  [facility] 67  6  Modified independent   [facility]  5  Supervision/Setup   Bed Mobility   Supine to Sit 6  Modified independent   Sit to Supine 6  Modified independent   Transfers   Sit to Stand 6  Modified independent   Stand to Sit 6  Modified independent   Stand pivot 5  Supervision  (RW)   Functional Mobility Functional Mobility 5  Supervision   Additional items Rolling walker   Activity Tolerance   Nurse Made Aware RN Sundar   RUE Assessment   RUE Assessment WFL   LUE Assessment   LUE Assessment WFL  (reports tingling in LUE when BP is taken)   Hand Function   Gross Motor Coordination Functional   Fine Motor Coordination Functional   Sensation   Light Touch No apparent deficits   Vision-Basic Assessment   Current Vision No visual deficits   Cognition   Overall Cognitive Status WFL   Arousal/Participation Alert; Cooperative   Attention Within functional limits   Orientation Level Oriented X4   Memory Within functional limits   Following Commands Follows all commands and directions without difficulty   Assessment   Limitation Decreased ADL status   Prognosis Good   Assessment Pt is a 62 y o  female seen for OT evaluation at [address] [place] [address], admitted 12/27/2021 w/ Vertigo  OT completed brief review of pt's medical and social history  Comorbidities affecting pt's functional performance at time of assessment include: hx PE & DVT (12/2021), HTN, chest pain, arthritis  Personal factors affecting pt at time of IE include:difficulty performing ADLS and difficulty performing IADLS   Prior to admission, pt was living in [place], [place], with adult son and was independent with ADL/IADL  Upon evaluation, pt presents to OT below baseline due to the following performance deficits: decreased balance and decreased tolerance  Pt's vertigo is limiting safety with higher level ADL/IADL  Pt to benefit from continued skilled OT tx while in the hospital to address deficits as defined above and maximize level of functional independence w ADL's and functional mobility  Occupational Performance areas to address include: bathing/shower, dressing, functional mobility and IADLs  The patient's raw score on the AM-PAC Daily Activity inpatient short form is 22, standardized score is 47 1, greater than 39 4   Patients at this level are likely to benefit from DC to home  Based on findings, pt is of low complexity  At this time, OT recommendations at time of discharge are return home with family support, pending progress  Goals   Patient Goals Wants to know whats wrong with her   Plan   Treatment Interventions ADL retraining;Functional transfer training;Patient/family training;Equipment evaluation/education; Compensatory technique education;Continued evaluation   Goal Expiration Date 01/08/22   OT Frequency 1-2x/wk   Recommendation   OT Discharge Recommendation No rehabilitation needs   Additional Comments  Recommend pt be seen for a follow up session as vertigo resolves in order to clear for safety of discharge   AM-PAC Daily Activity Inpatient   Lower Body Dressing 3   Bathing 3   Toileting 4   Upper Body Dressing 4   Grooming 4   Eating 4   Daily Activity Raw Score 22   Daily Activity Standardized Score (Calc for Raw Score >=11) 47  1   AM-PAC Applied Cognition Inpatient   Following a Speech/Presentation 4   Understanding Ordinary Conversation 4   Taking Medications 4   Remembering Where Things Are Placed or Put Away 4   Remembering List of 4-5 Errands 4   Taking Care of Complicated Tasks 4   Applied Cognition Raw Score 24   Applied Cognition Standardized Score 62 21     Pt will achieve the following goals within 10 days  *Pt will complete UB bathing and dressing with independence  *Pt will complete LB bathing and dressing with independence   *Pt will complete toileting (hygiene and clothing management) with independence    *Pt will complete bed mobility with independence, with bed flat and no side rail to prep for purposeful tasks    *Pt will perform functional transfers with independence in order to complete ADL routine  *Pt will increase standing tolerance to 10+ minutes in order to complete ADL/IADL routine  *Pt will complete item retrieval and light home management with independence while demonstrating good safety      *Pt will demonstrate increased activity tolerance in order to complete ADL routine      [employer], MS, OTR/L

## 2021-12-28 NOTE — PROGRESS NOTES
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, status post IVC filter placement (not MRI compatible), sciatica, hypertension, presents with vertiginous dizziness and chest pressure  Patient had a URI last week and continues to cough, is COVID negative  She reports acute onset room spinning dizziness exacerbated by movement and ambulation, which began the afternoon of 12/26/2021  With horizontal nystagmus with leftward gaze, but otherwise nonfocal exam   Suspect post URI vestibular neuronitis but cannot completely rule out central cause  Unfortunately, patient unable to have MRI due to IVC filter  Repeat CT of head is pending  Patient also reports sensitivity/mild allergy to steroids  She reports no improvement after receiving meclizine 50 mg p o  x1 and Valium 1 mg p o  x1     Plan:  -repeat CT of head is pending    -continue Xarelto  Patient is allergic to aspirin   -will treat vertigo with meclizine 25 mg q 8 hours, and Valium 5 mg IV p r n  , would not utilize these long term  -Zofran p r n  nausea  -PT/OT  -telemetry   -consider out patient vestibular therapy, ENT         Pulmonary embolism ([facility] [address] )  Assessment & Plan  On lifelong Xarelto  Also has IVC filter, which patient reports is not MRI compatible  Recommendations for outpatient neurological follow up have yet to be determined  Subjective:   Per note review this a m , the patient continued to have vertigo, repeat CT of head was ordered and pending  She never had vertigo before, she reports she is feeling better than Sunday, but this persists  She is able to walk with assistance  No new neurological complaints, no one sided weakness or numbness, no ataxia, no problems with vision  No problems with swallowing  She denies any tremor       She reports worse of vertigo with head movement - upward and downward, and left to right  ROS:  As per HPI otherwise negative  Vitals: Blood pressure 165/85, pulse 75, temperature 98 2 °F (36 8 °C), resp  rate 18, height 5' 3" (1 6 m), weight 86 2 kg (190 lb), SpO2 96 %  ,Body mass index is 33 66 kg/m²  Current Facility-Administered Medications   Medication Dose Route Frequency Provider Last Rate    acetaminophen  650 mg Oral Q6H PRN Pandi Todhe, DO      diazepam  5 mg Intravenous Q6H PRN Yobany Mckeon PA-C      losartan  25 mg Oral Daily Pandi Todhe, DO      meclizine  25 mg Oral Q8H [address] Nicole Hayward PA-C      ondansetron  4 mg Intravenous Once Tyrese Palma MD      ondansetron  4 mg Intravenous Q6H PRN Pandi Todhe, DO      rivaroxaban  20 mg Oral Daily With Breakfast Pandi Todhe, DO      sodium chloride  75 mL/hr Intravenous Continuous Pandi Todhe, DO 75 mL/hr (12/27/21 7303)       Physical Exam:    Constitutional - in NAD  HEENT - NC/AT  Cardiac - No murmur noted, rate regular  Lungs - Clear to auscultation bilaterally, no wheezing noted  Abdomen - non distended  Extremities - No edema noted  Skin - no rashes noted    Neurological    Mental status - the patient is awake alert oriented x 3, with no evidence of aphasia or dysarthria, patient is able to follow simple commands, is able to follow complex commands  No paraphasic errors noted  Cranial nerves 2 through 12 are intact - non dysconjugate gaze - nystagmus left sided improved from yesterday (slight today)  Motor - 5/5 upper extremities and lower extremities, without drift, normal tone and bulk    No tremor or fixed deficit noted    Rapid movements are equal bilaterally    Sensation - nonfocal to touch    Coordination -  no ataxia noted on finger-to-nose or heel-to-shin    Toes are downgoing bilaterally    No evidence of myoclonus or tremor  No evidence of seizure activity, seen       Gait no retropulsion or dense lateralizing features, no retropulsion noted  Cautious gait  Lab, Imaging and other studies:   I have personally reviewed pertinent reports  , CBC:   Results from last 7 days   Lab Units 12/28/21  0718 12/27/21  0119   WBC Thousand/uL 8 74 9 30   RBC Million/uL 4 63 5 10   HEMOGLOBIN g/dL 13 7 14 9   HEMATOCRIT % 42 9 46 2*   MCV fL 93 91   PLATELETS Thousands/uL 224 309   , BMP/CMP:   Results from last 7 days   Lab Units 12/28/21  0718 12/27/21  0119   SODIUM mmol/L 138 140   POTASSIUM mmol/L 3 9 3 9   CHLORIDE mmol/L 106 105   CO2 mmol/L 26 25   BUN mg/dL 13 17   CREATININE mg/dL 0 68 0 80   CALCIUM mg/dL 8 3 9 1   AST U/L 13 24   ALT U/L 39 56   ALK PHOS U/L 47 56   EGFR ml/min/1 73sq m 95 80   , Vitamin B12:   , HgBA1C:   , TSH:   , Coagulation:   Results from last 7 days   Lab Units 12/27/21 0119   INR  0 90   , Lipid Profile:   , Ammonia:   , Urinalysis:       Invalid input(s): URIBILINOGEN, Drug Screen:   , Medication Drug Levels:       Invalid input(s): CARBAMAZEPINE,  PHENOBARB, LACOSAMIDE, OXCARBAZEPINE     Procedure: CT head without contrast    Result Date: 12/27/2021  Narrative: CT BRAIN - WITHOUT CONTRAST INDICATION:   Dizziness, non-specific  Nausea and vomiting  COMPARISON:  None  TECHNIQUE:  CT examination of the brain was performed  In addition to axial images, sagittal and coronal 2D reformatted images were created and submitted for interpretation  Radiation dose length product (DLP) for this visit:  858 mGy-cm   This examination, like all CT scans performed in the [facility], was performed utilizing techniques to minimize radiation dose exposure, including the use of iterative reconstruction and automated exposure control  IMAGE QUALITY:  Diagnostic  FINDINGS: PARENCHYMA:  No intracranial mass, mass effect or midline shift  No CT signs of acute infarction  No acute parenchymal hemorrhage  VENTRICLES AND EXTRA-AXIAL SPACES:  Normal for the patient's age   VISUALIZED ORBITS AND PARANASAL SINUSES:  Mucosal thickening in the right maxillary sinus and scattered ethmoid air cells  Mastoid air cells are clear  CALVARIUM AND EXTRACRANIAL SOFT TISSUES:  Normal      Impression: No acute intracranial hemorrhage, midline shift, or mass effect  Workstation performed: [id number]     Procedure: CTA dissection protocol chest/abdomen/pelvis    Result Date: 12/27/2021  Narrative: CTA - CHEST, ABDOMEN AND PELVIS - WITHOUT AND WITH IV CONTRAST INDICATION:   Chest pain radiating to back, dizzy    COMPARISON:  None  TECHNIQUE: CT examination of the chest, abdomen and pelvis was performed both prior to and after the administration of intravenous contrast   The noncontrast portion of this examination was performed utilizing low radiation dose technique  Thin section angiographic arterial phase post contrast technique was used in order to evaluate for aortic dissection  3D reformatted images and volume rendering were performed on an independent workstation  Additionally, axial, sagittal, and coronal 2D reformatted images were created from the source data and submitted for interpretation  Radiation dose length product (DLP) for this visit:  1897 mGy-cm   This examination, like all CT scans performed in the [facility], was performed utilizing techniques to minimize radiation dose exposure, including the use of iterative reconstruction and automated exposure control  IV Contrast:  100 mL of iohexol (OMNIPAQUE) Enteric Contrast:  Enteric contrast was not administered  FINDINGS: AORTA:  There is no aortic dissection or intramural hematoma  There is no aortic aneurysm  Infrarenal IVC filter in place  CHEST LUNGS:  Trace dependent atelectasis  The central airways are patent  PLEURA:  Unremarkable  HEART/PULMONARY ARTERIAL TREE:  The heart is normal in size  No central pulmonary embolism  MEDIASTINUM AND JOLIE:  Unremarkable  CHEST WALL AND LOWER NECK:   Unremarkable   ABDOMEN LIVER/BILIARY TREE:  Unremarkable  GALLBLADDER:  No calcified gallstones  No pericholecystic inflammatory change  SPLEEN:  Unremarkable  PANCREAS:  Unremarkable  ADRENAL GLANDS:  Unremarkable  KIDNEYS/URETERS:  Unremarkable  No hydronephrosis  STOMACH AND BOWEL:  Small hiatal hernia  No bowel obstruction  APPENDIX:  A normal appendix was visualized  ABDOMINOPELVIC CAVITY:  No ascites or free intraperitoneal air  No lymphadenopathy  PELVIS REPRODUCTIVE ORGANS:  Patient is status post hysterectomy  URINARY BLADDER:  Unremarkable  ABDOMINAL WALL/INGUINAL REGIONS:  There is a small fat-containing umbilical hernia  OSSEOUS STRUCTURES:  No acute fracture or destructive osseous lesion  Impression: 1  No aortic dissection or aneurysm  2   Small hiatal hernia  Workstation performed: [id number]     Counseling / Coordination of Care  Reviewed with attending, examined with attending physician  Plan of care per attending physician

## 2021-12-28 NOTE — ASSESSMENT & PLAN NOTE
CT head showed - Right maxillary sinus mucosal thickening with some dependent fluid consistent with acute on chronic right maxillary sinusitis  Scattered mucosal thickening seen elsewhere in the sphenoid sinus and ethmoid air cells  Admits to having maxillary sinus pressure, pt is getting over recent URI  Will start on augmentin 875-125 for 7 days

## 2021-12-28 NOTE — PLAN OF CARE
Problem: PHYSICAL THERAPY ADULT  Goal: Performs mobility at highest level of function for planned discharge setting  See evaluation for individualized goals  Description: Treatment/Interventions: Elevations,Therapeutic exercise,Patient/family training,Endurance training,Equipment eval/education,Bed mobility,Gait training,Compensatory technique education,Continued evaluation,Spoke to nursing,Spoke to case management (spoke to DO)          See flowsheet documentation for full assessment, interventions and recommendations  Note: Prognosis: Fair  Problem List: Decreased endurance,Impaired balance,Decreased mobility,Impaired sensation  Assessment: Pt is a 62 y o  female who presented to ED with c/o chest pressure and dizziness, nausea, vomiting, diaphoresis  Personal factors affecting pt at time of IE include: home alone during the day, multilevel environment, requires AD for safe mobility  Prior to admission, pt was independent w/ all functional mobility w/ out AD, ambulated community distances and elevations, lived in multi-level home and lived with son;  Does shop/drive/cook/clean  Upon evaluation: Pt mod I for bed mobility, mod I for sit to stand, and S for short distance ambulation with RW  Full objective findings from PT assessment regarding body systems outlined above  Current limitations include impaired balance,decreased activity tolerance The following objective measures performed on IE also reveal limitations: (-) orthostatic BP measurements as above;  BPPV assessment as above  Pt's clinical presentation is currently unstable/unpredictable seen in pt's presentation of continuous monitoring, fall risk, and unpredictable balance deficits secondary to mixed c/o light headed and room spinning dizziness     Pt would benefit from continued PT while in hospital and follow up with OPPT at D/C to address BPPV as appropriate and continue to increase independence and decrease need for AD to return to OF, independent without AD  The patient's AM-PAC Basic Mobility Inpatient Short Form Raw Score is 24  A Raw score of greater than 16 suggests the patient may benefit from discharge to home with use of RW for all mobility and OPPT  Please also refer to the recommendation of the Physical Therapist for safe discharge planning  Barriers to Discharge: Decreased caregiver support,Inaccessible home environment        PT Discharge Recommendation: Home with outpatient rehabilitation (needs RW;  OP vestibular therapy)          See flowsheet documentation for full assessment

## 2021-12-28 NOTE — QUICK NOTE
Pt continues to have vertigo symptoms, will order repeat CT head per neuro recommendations, progress note to follow

## 2021-12-28 NOTE — ASSESSMENT & PLAN NOTE
Presents with pressure like sensation  Worse with breathing, movement or palpation  States pain radiates to back  CTA chest negative for dissection  EKG negative for St changes  Trop 2  Most likely costochondritis 2/2 recent URI with coughing

## 2021-12-28 NOTE — ASSESSMENT & PLAN NOTE
Presents with vertigo symptoms for last 24 hours  Describes it as room spinning sensation  Admits getting over a recent URI  Never had similar symptoms in past  CT head negative  No improvement with zofran, meclezine, IVF, and valium  Neurology consulted, appreciate recommendations  Continue to treat vertigo with meclizine 25 mg q 8 hours, and Valium 5 mg IV p r n  , would not utilize these long term  Repeat CT head this AM showed - No acute intracranial abnormality    Hopefully plan to discharge tomorrow if patient feeling better, (Plan on d/c with prn meclizine and zofran)  Will need out patient vestibular therapy, ENT

## 2021-12-29 PROBLEM — S30.1XXA RECTUS SHEATH HEMATOMA: Status: ACTIVE | Noted: 2021-12-29

## 2021-12-29 LAB
DME PARACHUTE DELIVERY DATE REQUESTED: NORMAL
DME PARACHUTE ITEM DESCRIPTION: NORMAL
DME PARACHUTE ORDER STATUS: NORMAL
DME PARACHUTE SUPPLIER NAME: NORMAL
DME PARACHUTE SUPPLIER PHONE: NORMAL
HCT VFR BLD AUTO: 43.3 % (ref 34.8–46.1)
HGB BLD-MCNC: 13.9 G/DL (ref 11.5–15.4)

## 2021-12-29 PROCEDURE — 99254 IP/OBS CNSLTJ NEW/EST MOD 60: CPT | Performed by: SURGERY

## 2021-12-29 PROCEDURE — 99232 SBSQ HOSP IP/OBS MODERATE 35: CPT | Performed by: STUDENT IN AN ORGANIZED HEALTH CARE EDUCATION/TRAINING PROGRAM

## 2021-12-29 PROCEDURE — 85018 HEMOGLOBIN: CPT | Performed by: PHYSICIAN ASSISTANT

## 2021-12-29 PROCEDURE — 85014 HEMATOCRIT: CPT | Performed by: PHYSICIAN ASSISTANT

## 2021-12-29 RX ORDER — MECLIZINE HYDROCHLORIDE 25 MG/1
25 TABLET ORAL EVERY 8 HOURS PRN
Qty: 30 TABLET | Refills: 0 | Status: SHIPPED | OUTPATIENT
Start: 2021-12-29

## 2021-12-29 RX ORDER — ONDANSETRON 4 MG/1
4 TABLET, ORALLY DISINTEGRATING ORAL EVERY 6 HOURS PRN
Qty: 20 TABLET | Refills: 0 | Status: SHIPPED | OUTPATIENT
Start: 2021-12-29

## 2021-12-29 RX ORDER — AMOXICILLIN AND CLAVULANATE POTASSIUM 875; 125 MG/1; MG/1
1 TABLET, FILM COATED ORAL EVERY 12 HOURS SCHEDULED
Qty: 11 TABLET | Refills: 0 | Status: SHIPPED | OUTPATIENT
Start: 2021-12-29 | End: 2022-01-04

## 2021-12-29 RX ADMIN — SODIUM CHLORIDE 75 ML/HR: 0.9 INJECTION, SOLUTION INTRAVENOUS at 19:27

## 2021-12-29 RX ADMIN — AMOXICILLIN AND CLAVULANATE POTASSIUM 1 TABLET: 875; 125 TABLET, FILM COATED ORAL at 09:39

## 2021-12-29 RX ADMIN — AMOXICILLIN AND CLAVULANATE POTASSIUM 1 TABLET: 875; 125 TABLET, FILM COATED ORAL at 21:38

## 2021-12-29 RX ADMIN — MECLIZINE 25 MG: 12.5 TABLET ORAL at 05:15

## 2021-12-29 RX ADMIN — RIVAROXABAN 20 MG: 10 TABLET, FILM COATED ORAL at 09:38

## 2021-12-29 RX ADMIN — LOSARTAN POTASSIUM 25 MG: 25 TABLET, FILM COATED ORAL at 09:39

## 2021-12-29 RX ADMIN — MECLIZINE 25 MG: 12.5 TABLET ORAL at 21:38

## 2021-12-29 RX ADMIN — MECLIZINE 25 MG: 12.5 TABLET ORAL at 13:21

## 2021-12-29 NOTE — CONSULTS
Consultation - General Surgery   Apple Martinez 62 y o  female MRN: [id number]  Unit/Bed#: -01 Encounter: [id number]    Assessment/Plan   Rectus sheath hematoma in patient on anticoagulation with recent cough and vomiting, admitted for dizziness and chest pain    Rectus sheath hematoma  -noted on CT done for c/o left sided abdominal pain  -CT reviewed  Findings of 4 5 x 2 5 cm rectus sheath hematoma with asymetric enlargement of the left rectus muscle  -CTA 1 day prior without extravasation or hematoma  but does have enlargement of left rectus muscle  -VSS  -HGB stable, 13 9 today  -will place abdominal binder  -hold anticoagulation, patient did receive Xarelto yesterday and this AM  -would recommend monitoring today, follow hemoglobin and vital signs  -no plans for surgical intervention    H/o DVT/PE  -s/p IVC filter, on Xarelto  -hold anticoagulation 2/2 above    Vertigo  -improved  -continue medical management  -vestibular therapy, ENT as outpatient    Atypical CP  -work-up negative  -felt to be secondary to costochondritis with recent URI and cough  -medical management    Rectal bleeding  -one episode, minimal blood  -due for colonoscopy  -outpatient GI F/U      History of Present Illness     HPI:  Apple Martinez is a 62 y o  female who was admitted for symptoms of dizziness and chest pain  She was diagnosed with vertigo and atypical chest pain  CTA negative for dissection  Patient with history of recent upper respiratory infection and cough  Medicine feels symptoms related to chondral contrast as from coughing  Patient also admits to vomiting in the emergency department secondary to vertigo  She has noted some left-sided abdominal pain over the past 2 weeks  This has worsened since her admission  She had a CT scan this morning that shows a small left  rectus sheath hematoma  This was not identified on previous CTA  Vital signs have been stable    Hemoglobin on admission was 14 9, 13 7 yesterday  Hemoglobin is pending for today  Patient denies any history of bleeding complications  She does admit to some blood on her underwear few days ago after vomiting  She denies hemorrhoids  No change in bowel habits  She is due for a colonoscopy  Inpatient consult to Acute Care Surgery  Consult performed by: Jackeline Jaramillo PA-C  Consult ordered by: Tello Maki DO          Review of Systems   Constitutional: Negative  Negative for chills, fever and unexpected weight change  HENT: Negative  Eyes: Negative  Respiratory: Positive for cough  Negative for shortness of breath and wheezing  Cardiovascular: Positive for chest pain  Negative for palpitations  Gastrointestinal: Positive for abdominal pain and vomiting  Negative for blood in stool, constipation, diarrhea and nausea  Endocrine: Negative  Genitourinary: Negative  Negative for difficulty urinating and dysuria  Musculoskeletal: Negative  Skin: Negative  Negative for rash and wound  Allergic/Immunologic: Negative  Neurological: Positive for dizziness  Negative for weakness  Hematological: Negative  Does not bruise/bleed easily  Psychiatric/Behavioral: Negative  All other systems reviewed and are negative        Historical Information   Past Medical History:   Diagnosis Date    History of DVT (deep vein thrombosis) 2013    History of pulmonary embolism 2013    Hypertension      Past Surgical History:   Procedure Laterality Date    BREAST MASS EXCISION       Social History   Social History     Substance and Sexual Activity   Alcohol Use Yes    Comment: socially     Social History     Substance and Sexual Activity   Drug Use Never     E-Cigarette/Vaping    E-Cigarette Use Never User      E-Cigarette/Vaping Substances    Nicotine No     THC No     CBD No     Flavoring No     Other No     Unknown No      Social History     Tobacco Use   Smoking Status Never Smoker   Smokeless Tobacco Never Used Family History: non-contributory    Meds/Allergies   all current active meds have been reviewed  Allergies   Allergen Reactions    Aspirin     Fluticasone        Objective   First Vitals:   Blood Pressure: 167/82 (12/27/21 0059)  Pulse: 68 (12/27/21 0059)  Temperature: 97 8 °F (36 6 °C) (12/27/21 0059)  Temp Source: Temporal (12/27/21 0059)  Respirations: 18 (12/27/21 0059)  Height: 5' 3" (160 cm) (12/27/21 0059)  Weight - Scale: 86 2 kg (190 lb) (12/27/21 0059)  SpO2: 97 % (12/27/21 0059)    Current Vitals:   Blood Pressure: 124/74 (12/29/21 0728)  Pulse: 81 (12/28/21 1436)  Temperature: 98 °F (36 7 °C) (12/29/21 0728)  Temp Source: Temporal (12/27/21 1435)  Respirations: 18 (12/29/21 0728)  Height: 5' 3" (160 cm) (12/27/21 0059)  Weight - Scale: 89 1 kg (196 lb 6 9 oz) (12/29/21 0600)  SpO2: 95 % (12/29/21 0758)    No intake or output data in the 24 hours ending 12/29/21 1204    Invasive Devices  Report    Peripheral Intravenous Line            Peripheral IV 12/27/21 Right Antecubital 2 days                Physical Exam  Constitutional:       General: She is not in acute distress  Appearance: She is well-developed  She is not diaphoretic  HENT:      Head: Normocephalic and atraumatic  Mouth/Throat:      Pharynx: No oropharyngeal exudate  Eyes:      General: No scleral icterus  Right eye: No discharge  Left eye: No discharge  Neck:      Thyroid: No thyromegaly  Vascular: No JVD  Trachea: No tracheal deviation  Comments: Trachea midline  Cardiovascular:      Rate and Rhythm: Normal rate and regular rhythm  Heart sounds: Normal heart sounds  No murmur heard  Pulmonary:      Effort: Pulmonary effort is normal  No respiratory distress  Breath sounds: Normal breath sounds  No wheezing  Abdominal:      General: Bowel sounds are normal  There is no distension  Palpations: Abdomen is soft  Comments: Tenderness over left mid abdomen    No palpable defect  NO swelling  No overlying skin changes  Small reducible umbilical hernia   Musculoskeletal:         General: No deformity  Normal range of motion  Cervical back: Normal range of motion and neck supple  Skin:     General: Skin is warm and dry  Findings: No rash  Neurological:      Mental Status: She is alert and oriented to person, place, and time  Comments: No focal deficits   Psychiatric:         Behavior: Behavior normal          Lab Results:   I have personally reviewed pertinent lab results  , CBC:   Lab Results   Component Value Date    HGB 13 9 12/29/2021    HCT 43 3 12/29/2021   , CMP: No results found for: SODIUM, K, CL, CO2, ANIONGAP, BUN, CREATININE, GLUCOSE, CALCIUM, AST, ALT, ALKPHOS, PROT, BILITOT, EGFR, Coagulation: No results found for: PT, INR, APTT, Urinalysis: No results found for: COLORU, CLARITYU, SPECGRAV, PHUR, LEUKOCYTESUR, NITRITE, PROTEINUA, GLUCOSEU, KETONESU, BILIRUBINUR, BLOODU, Amylase: No results found for: AMYLASE, Lipase: No results found for: LIPASE  Imaging: I have personally reviewed pertinent reports  EKG, Pathology, and Other Studies: I have personally reviewed pertinent reports        Bettie Mcguire PA-C

## 2021-12-29 NOTE — PLAN OF CARE
Problem: Potential for Falls  Goal: Patient will remain free of falls  Description: INTERVENTIONS:  - Educate patient/family on patient safety including physical limitations  - Instruct patient to call for assistance with activity   - Consult OT/PT to assist with strengthening/mobility   - Keep Call bell within reach  - Keep bed low and locked with side rails adjusted as appropriate  - Keep care items and personal belongings within reach  - Initiate and maintain comfort rounds  - Make Fall Risk Sign visible to staff  - Offer Toileting every 2 Hours, in advance of need  - Initiate/Maintain bed alarm  - Obtain necessary fall risk management equipment: sock  - Apply yellow socks and bracelet for high fall risk patients  - Consider moving patient to room near nurses station  Outcome: Progressing     Problem: PAIN - ADULT  Goal: Verbalizes/displays adequate comfort level or baseline comfort level  Description: Interventions:  - Encourage patient to monitor pain and request assistance  - Assess pain using appropriate pain scale  - Administer analgesics based on type and severity of pain and evaluate response  - Implement non-pharmacological measures as appropriate and evaluate response  - Consider cultural and social influences on pain and pain management  - Notify physician/advanced practitioner if interventions unsuccessful or patient reports new pain  Outcome: Progressing     Problem: INFECTION - ADULT  Goal: Absence or prevention of progression during hospitalization  Description: INTERVENTIONS:  - Assess and monitor for signs and symptoms of infection  - Monitor lab/diagnostic results  - Monitor all insertion sites, i e  indwelling lines, tubes, and drains  - Monitor endotracheal if appropriate and nasal secretions for changes in amount and color  - [place] appropriate cooling/warming therapies per order  - Administer medications as ordered  - Instruct and encourage patient and family to use good hand hygiene technique  - Identify and instruct in appropriate isolation precautions for identified infection/condition  Outcome: Progressing  Goal: Absence of fever/infection during neutropenic period  Description: INTERVENTIONS:  - Monitor WBC    Outcome: Progressing     Problem: SAFETY ADULT  Goal: Patient will remain free of falls  Description: INTERVENTIONS:  - Educate patient/family on patient safety including physical limitations  - Instruct patient to call for assistance with activity   - Consult OT/PT to assist with strengthening/mobility   - Keep Call bell within reach  - Keep bed low and locked with side rails adjusted as appropriate  - Keep care items and personal belongings within reach  - Initiate and maintain comfort rounds  - Make Fall Risk Sign visible to staff  - Offer Toileting every 2 Hours, in advance of need  - Initiate/Maintain bed alarm  - Obtain necessary fall risk management equipment: sock  - Apply yellow socks and bracelet for high fall risk patients  - Consider moving patient to room near nurses station  Outcome: Progressing  Goal: Maintain or return to baseline ADL function  Description: INTERVENTIONS:  - Educate patient/family on patient safety including physical limitations  - Instruct patient to call for assistance with activity   - Consult OT/PT to assist with strengthening/mobility   - Keep Call bell within reach  - Keep bed low and locked with side rails adjusted as appropriate  - Keep care items and personal belongings within reach  - Initiate and maintain comfort rounds  - Make Fall Risk Sign visible to staff  - Offer Toileting every 2 Hours, in advance of need  - Initiate/Maintain bed alarm  - Obtain necessary fall risk management equipment: socks  - Apply yellow socks and bracelet for high fall risk patients  - Consider moving patient to room near nurses station  Outcome: Progressing  Goal: Maintains/Returns to pre admission functional level  Description: INTERVENTIONS:  - Perform BMAT or MOVE assessment daily    - Set and communicate daily mobility goal to care team and patient/family/caregiver  - Collaborate with rehabilitation services on mobility goals if consulted  - Out of bed for toileting  - Record patient progress and toleration of activity level   Outcome: Progressing     Problem: DISCHARGE PLANNING  Goal: Discharge to home or other facility with appropriate resources  Description: INTERVENTIONS:  - Identify barriers to discharge w/patient and caregiver  - Arrange for needed discharge resources and transportation as appropriate  - Identify discharge learning needs (meds, wound care, etc )  - Arrange for interpretive services to assist at discharge as needed  - Refer to Case Management Department for coordinating discharge planning if the patient needs post-hospital services based on physician/advanced practitioner order or complex needs related to functional status, cognitive ability, or social support system  Outcome: Progressing     Problem: Knowledge Deficit  Goal: Patient/family/caregiver demonstrates understanding of disease process, treatment plan, medications, and discharge instructions  Description: Complete learning assessment and assess knowledge base    Interventions:  - Provide teaching at level of understanding  - Provide teaching via preferred learning methods  Outcome: Progressing

## 2021-12-29 NOTE — ASSESSMENT & PLAN NOTE
Presents with vertigo symptoms for last 24 hours  Describes it as room spinning sensation  Admits getting over a recent URI  Never had similar symptoms in past  CT head negative  No improvement with zofran, meclezine, IVF, and valium  Neurology consulted, appreciate recommendations  Continue to treat vertigo with meclizine 25 mg q 8 hours, and Valium 5 mg IV p r n  , would not utilize these long term  Repeat CT head this AM showed - No acute intracranial abnormality    Patient feeling better today will d/c with prn meclizine and zofran   Will need out patient vestibular therapy, referral made

## 2021-12-29 NOTE — CASE MANAGEMENT
Case Management Assessment & Discharge Planning Note    Patient name Sekou Keyes  Location /-84 MRN [id number]  : 1960 Date 2021       Current Admission Date: 2021  Current Admission Diagnosis:Vertigo   Patient Active Problem List    Diagnosis Date Noted    LLQ abdominal pain 2021    Acute non-recurrent maxillary sinusitis 2021    Hypertension     Atypical chest pain     Vertigo     Arthritis 2021    Borderline blood pressure 2021    DVT of leg (deep venous thrombosis) ([facility] [address] ) 2021    Knee pain, acute 2021    Pulmonary embolism ([facility] [address] ) 2021    Acquired hallux limitus of both feet 2021      LOS (days): 0  Geometric Mean LOS (GMLOS) (days):   Days to GMLOS:     OBJECTIVE:       Current admission status: Observation    Preferred Pharmacy:   [address] [facility] [address]  Phone: [phone] Fax: [phone]    Primary Care Provider: Edwar Stover DO    Primary Insurance: [place] [employer]  Secondary Insurance:     ASSESSMENT:  Active Health Care Agents    There are no active Health Care Agents on file  Advance Directives  Does patient have a [address] [place] [address] Avenue?: No  Was patient offered paperwork?: Yes  Does patient currently have a Health Care decision maker?: No  Does patient have Advance Directives?: No  Was patient offered paperwork?: Yes  Primary Contact: Daughter and son    Readmission [facility]  30 Day Readmission: No    Patient Information  Admitted from[de-identified] Home  Mental Status: Alert  During Assessment patient was accompanied by: Not accompanied during assessment  Assessment information provided by[de-identified] Patient  Primary Caregiver: Self  Support Systems: [address] [place] [address] of Residence: [address] [place] [address] do you live in?: [address] [place] [address] entry access options  Select all that apply : Stairs  Number of steps to enter home  : 1  Do the steps have railings?: Yes  Type of Current Residence: 2 story home  Upon entering residence, is there a bedroom on the main floor (no further steps)?: Yes  Upon entering residence, is there a bathroom on the main floor (no further steps)?: No  Indicate which floors of current residence have a bathroom (select all the apply):: 2nd Floor  Number of steps to 2nd floor from main floor: One Flight  In the last 12 months, was there a time when you were not able to pay the mortgage or rent on time?: No  In the last 12 months, how many places have you lived?: 1  In the last 12 months, was there a time when you did not have a steady place to sleep or slept in a shelter (including now)?: No  Homeless/housing insecurity resource given?: N/A    Activities of Daily Living Prior to Admission  Functional Status: Independent  Completes ADLs independently?: Yes  Ambulates independently?: Yes  Does patient use assisted devices?: No  Does patient currently own DME?: No  Does patient have a history of Outpatient Therapy (PT/OT)?: No  Does the patient have a history of Short-Term Rehab?: No  Does patient have a history of HHC?: No  Does patient currently have [facility]?: No    Patient Information Continued  Income Source: Unemployed  Does patient have prescription coverage?: Yes  Within the past 12 months, you worried that your food would run out before you got the money to buy more : Never true  Within the past 12 months, the food you bought just didnt last and you didnt have money to get more : Never true  Food insecurity resource given?: N/A  Does patient receive dialysis treatments?: No  Does patient have a history of substance abuse?: No    Means of Transportation  Means of Transport to Appts[de-identified] Drives Self  In the past 12 months, has lack of transportation kept you from medical appointments or from getting medications?: No  In the past 12 months, has lack of transportation kept you from meetings, work, or from getting things needed for daily living?: No  Was application for public transport provided?: N/A    DISCHARGE DETAILS:    Discharge planning discussed with[de-identified] Patient  Freedom of Choice: Yes     CM contacted family/caregiver?: No- see comments (declines)  Were Treatment Team discharge recommendations reviewed with patient/caregiver?: Yes  Did patient/caregiver verbalize understanding of patient care needs?: Yes  Were patient/caregiver advised of the risks associated with not following Treatment Team discharge recommendations?: Yes    [address]         Is the patient interested in [facility] at discharge?: No    DME Referral Provided  Referral made for DME?: Yes  DME referral completed for the following items[de-identified] Adenike Shaffer  DME Supplier Name[de-identified] [employer]    Other Referral/Resources/Interventions Provided:  Interventions: DME  Referral Comments: Gave patient list of [phone] therapy sites    Treatment Team Recommendation: Home  Discharge Destination Plan[de-identified] Home  Transport at Discharge : Family     Additional Comments: Met with patient at bedside  Patient will go to OP therapy-provided with list   Adenike Shaffer ordered and given to patient from [address] [address]

## 2021-12-29 NOTE — PLAN OF CARE
Problem: Potential for Falls  Goal: Patient will remain free of falls  Description: INTERVENTIONS:  - Educate patient/family on patient safety including physical limitations  - Instruct patient to call for assistance with activity   - Consult OT/PT to assist with strengthening/mobility   - Keep Call bell within reach  - Keep bed low and locked with side rails adjusted as appropriate  - Keep care items and personal belongings within reach  - Initiate and maintain comfort rounds  - Make Fall Risk Sign visible to staff  - Offer Toileting every 2 Hours, in advance of need  - Initiate/Maintain bed alarm  - Obtain necessary fall risk management equipment: sock  - Apply yellow socks and bracelet for high fall risk patients  - Consider moving patient to room near nurses station  Outcome: Progressing     Problem: PAIN - ADULT  Goal: Verbalizes/displays adequate comfort level or baseline comfort level  Description: Interventions:  - Encourage patient to monitor pain and request assistance  - Assess pain using appropriate pain scale  - Administer analgesics based on type and severity of pain and evaluate response  - Implement non-pharmacological measures as appropriate and evaluate response  - Consider cultural and social influences on pain and pain management  - Notify physician/advanced practitioner if interventions unsuccessful or patient reports new pain  Outcome: Progressing     Problem: INFECTION - ADULT  Goal: Absence or prevention of progression during hospitalization  Description: INTERVENTIONS:  - Assess and monitor for signs and symptoms of infection  - Monitor lab/diagnostic results  - Monitor all insertion sites, i e  indwelling lines, tubes, and drains  - Monitor endotracheal if appropriate and nasal secretions for changes in amount and color  - [place] appropriate cooling/warming therapies per order  - Administer medications as ordered  - Instruct and encourage patient and family to use good hand hygiene technique  - Identify and instruct in appropriate isolation precautions for identified infection/condition  Outcome: Progressing  Goal: Absence of fever/infection during neutropenic period  Description: INTERVENTIONS:  - Monitor WBC    Outcome: Progressing     Problem: SAFETY ADULT  Goal: Patient will remain free of falls  Description: INTERVENTIONS:  - Educate patient/family on patient safety including physical limitations  - Instruct patient to call for assistance with activity   - Consult OT/PT to assist with strengthening/mobility   - Keep Call bell within reach  - Keep bed low and locked with side rails adjusted as appropriate  - Keep care items and personal belongings within reach  - Initiate and maintain comfort rounds  - Make Fall Risk Sign visible to staff  - Offer Toileting every 2 Hours, in advance of need  - Initiate/Maintain bed alarm  - Obtain necessary fall risk management equipment: sock  - Apply yellow socks and bracelet for high fall risk patients  - Consider moving patient to room near nurses station  Outcome: Progressing  Goal: Maintain or return to baseline ADL function  Description: INTERVENTIONS:  - Educate patient/family on patient safety including physical limitations  - Instruct patient to call for assistance with activity   - Consult OT/PT to assist with strengthening/mobility   - Keep Call bell within reach  - Keep bed low and locked with side rails adjusted as appropriate  - Keep care items and personal belongings within reach  - Initiate and maintain comfort rounds  - Make Fall Risk Sign visible to staff  - Offer Toileting every 2 Hours, in advance of need  - Initiate/Maintain bed alarm  - Obtain necessary fall risk management equipment: socks  - Apply yellow socks and bracelet for high fall risk patients  - Consider moving patient to room near nurses station  Outcome: Progressing  Goal: Maintains/Returns to pre admission functional level  Description: INTERVENTIONS:  - Perform BMAT or MOVE assessment daily    - Set and communicate daily mobility goal to care team and patient/family/caregiver  - Collaborate with rehabilitation services on mobility goals if consulted  - Perform Range of Motion 2 times a day  - Reposition patient every 2 hours  - Dangle patient 2 times a day  - Stand patient 2 times a day  - Ambulate patient 2 times a day  - Out of bed to chair 2 times a day   - Out of bed for meals 2 times a day  - Out of bed for toileting  - Record patient progress and toleration of activity level   Outcome: Progressing     Problem: DISCHARGE PLANNING  Goal: Discharge to home or other facility with appropriate resources  Description: INTERVENTIONS:  - Identify barriers to discharge w/patient and caregiver  - Arrange for needed discharge resources and transportation as appropriate  - Identify discharge learning needs (meds, wound care, etc )  - Arrange for interpretive services to assist at discharge as needed  - Refer to Case Management Department for coordinating discharge planning if the patient needs post-hospital services based on physician/advanced practitioner order or complex needs related to functional status, cognitive ability, or social support system  Outcome: Progressing     Problem: Knowledge Deficit  Goal: Patient/family/caregiver demonstrates understanding of disease process, treatment plan, medications, and discharge instructions  Description: Complete learning assessment and assess knowledge base    Interventions:  - Provide teaching at level of understanding  - Provide teaching via preferred learning methods  Outcome: Progressing

## 2021-12-29 NOTE — PROGRESS NOTES
[place]  Progress Note - Sandy  1960, 62 y o  female MRN: [id number]  Unit/Bed#: -01 Encounter: [id number]  Primary Care Provider: Armida Turner DO   Date and time admitted to hospital: 12/27/2021  1:11 AM    * Vertigo  Assessment & Plan  Presents with vertigo symptoms for last 24 hours  Describes it as room spinning sensation  Admits getting over a recent URI  Never had similar symptoms in past  CT head negative  No improvement with zofran, meclezine, IVF, and valium  Neurology consulted, appreciate recommendations  Continue to treat vertigo with meclizine 25 mg q 8 hours, and Valium 5 mg IV p r n  , would not utilize these long term  Repeat CT head this AM showed - No acute intracranial abnormality  will d/c with prn meclizine and zofran when ready   Will need out patient vestibular therapy, referral made    Atypical chest pain  Assessment & Plan  Presents with pressure like sensation  Worse with breathing, movement or palpation  States pain radiates to back  CTA chest negative for dissection  EKG negative for St changes  Trop 2  Most likely costochondritis 2/2 recent URI with coughing  Resolved    Rectus sheath hematoma  Assessment & Plan  Admits to intermittent LLQ abdominal pain described as sharp in nature  CT abdomen showed 4 5 cm x 2 5 cm acute left rectus sheath hematoma associated asymmetric enlargement of the left rectus muscle, new from the previous study  CTA earlier in the admission did not show hematoma  General surgery consulted  Pt had xeralto this AM, will hold for now  Hg stable 13 9    Acute non-recurrent maxillary sinusitis  Assessment & Plan  CT head showed - Right maxillary sinus mucosal thickening with some dependent fluid consistent with acute on chronic right maxillary sinusitis  Scattered mucosal thickening seen elsewhere in the sphenoid sinus and ethmoid air cells    Admits to having maxillary sinus pressure, pt is getting over recent URI  Will start on augmentin 875-125 for 7 days  Hypertension  Assessment & Plan  Resume lisinopril    Pulmonary embolism (HCC)  Assessment & Plan  Hx of pe/dvt s/p ivc filter  Resume xeralto          VTE Pharmacologic Prophylaxis: VTE Score: 6 High Risk (Score >/= 5) - Pharmacological DVT Prophylaxis Contraindicated  Sequential Compression Devices Ordered  Patient Centered Rounds: I performed bedside rounds with nursing staff today  Discussions with Specialists or Other Care Team Provider: surgery    Education and Discussions with Family / Patient: Updated  (daughter) via phone  Time Spent for Care: 30 minutes  More than 50% of total time spent on counseling and coordination of care as described above  Current Length of Stay: 0 day(s)  Current Patient Status: Observation   Certification Statement: The patient will continue to require additional inpatient hospital stay due to abdominal hematoma  Discharge Plan: Anticipate discharge tomorrow to home  Code Status: Level 1 - Full Code    Subjective:   Patient states that dizziness is improved  Admits to having intermittent left lower quadrant abdominal pain  Denies chest pain or shortness of breath    Objective:     Vitals:   Temp (24hrs), Av 2 °F (36 8 °C), Min:98 °F (36 7 °C), Max:98 3 °F (36 8 °C)    Temp:  [98 °F (36 7 °C)-98 3 °F (36 8 °C)] 98 °F (36 7 °C)  HR:  [81] 81  Resp:  [18] 18  BP: (124-130)/(66-74) 124/74  SpO2:  [94 %-95 %] 95 %  Body mass index is 34 8 kg/m²  Input and Output Summary (last 24 hours):   No intake or output data in the 24 hours ending 21 1252    Physical Exam:   Physical Exam  Vitals reviewed  HENT:      Head: Normocephalic and atraumatic  Right Ear: External ear normal       Left Ear: External ear normal       Nose: Nose normal       Mouth/Throat:      Mouth: Mucous membranes are moist       Pharynx: Oropharynx is clear     Eyes:      Extraocular Movements: Extraocular movements intact  Cardiovascular:      Rate and Rhythm: Normal rate and regular rhythm  Pulses: Normal pulses  Heart sounds: Normal heart sounds  Pulmonary:      Effort: Pulmonary effort is normal       Breath sounds: Normal breath sounds  Abdominal:      General: Abdomen is flat  Palpations: Abdomen is soft  Tenderness: There is abdominal tenderness  There is no guarding or rebound  Musculoskeletal:         General: Normal range of motion  Cervical back: Normal range of motion  Skin:     General: Skin is warm and dry  Neurological:      General: No focal deficit present  Mental Status: She is alert  Psychiatric:         Mood and Affect: Mood normal           Additional Data:     Labs:  Results from last 7 days   Lab Units 12/29/21  1156 12/28/21  0718 12/28/21  0718 12/27/21  0119 12/27/21  0119   WBC Thousand/uL  --   --  8 74   < > 9 30   HEMOGLOBIN g/dL 13 9   < > 13 7   < > 14 9   HEMATOCRIT % 43 3   < > 42 9   < > 46 2*   PLATELETS Thousands/uL  --   --  224   < > 309   NEUTROS PCT %  --   --   --   --  56   LYMPHS PCT %  --   --   --   --  34   MONOS PCT %  --   --   --   --  8   EOS PCT %  --   --   --   --  2    < > = values in this interval not displayed       Results from last 7 days   Lab Units 12/28/21  0718   SODIUM mmol/L 138   POTASSIUM mmol/L 3 9   CHLORIDE mmol/L 106   CO2 mmol/L 26   BUN mg/dL 13   CREATININE mg/dL 0 68   ANION GAP mmol/L 6   CALCIUM mg/dL 8 3   ALBUMIN g/dL 3 2*   TOTAL BILIRUBIN mg/dL 0 50   ALK PHOS U/L 47   ALT U/L 39   AST U/L 13   GLUCOSE RANDOM mg/dL 93     Results from last 7 days   Lab Units 12/27/21  0119   INR  0 90             Results from last 7 days   Lab Units 12/27/21  0119   LACTIC ACID mmol/L 1 3       Lines/Drains:  Invasive Devices  Report    Peripheral Intravenous Line            Peripheral IV 12/27/21 Right Antecubital 2 days                  Telemetry:  Telemetry Orders (From admission, onward)             48 Hour Telemetry Monitoring  (ED Bridging Orders Panel)  Continuous x 48 hours           References:    Telemetry Guidelines   Question:  Reason for 48 Hour Telemetry  Answer:  Acute MI, chest pain - R/O MI, or unstable angina                 Telemetry Reviewed: Normal Sinus Rhythm  Indication for Continued Telemetry Use: No indication for continued use  Will discontinue  Imaging: Reviewed radiology reports from this admission including: abdominal/pelvic CT    Recent Cultures (last 7 days):         Last 24 Hours Medication List:   Current Facility-Administered Medications   Medication Dose Route Frequency Provider Last Rate    acetaminophen  650 mg Oral Q6H PRN Pandi Todhe, DO      amoxicillin-clavulanate  1 tablet Oral Q12H [address] Trevor Fierro PA-C      diazepam  5 mg Intravenous Q6H PRN Glori Libman, PA-C      losartan  25 mg Oral Daily Pandi Todhe, DO      meclizine  25 mg Oral Q8H [address] Nicole Hayward PA-C      ondansetron  4 mg Intravenous Once Tee Thomson MD      ondansetron  4 mg Intravenous Q6H PRN Pandi Todhe, DO      sodium chloride  75 mL/hr Intravenous Continuous Pandi Todhe, DO 75 mL/hr (21)        Today, Patient Was Seen By: Inderjit Stovall DO    **Please Note: This note may have been constructed using a voice recognition system  **

## 2021-12-29 NOTE — ASSESSMENT & PLAN NOTE
Presents with pressure like sensation  Worse with breathing, movement or palpation  States pain radiates to back  CTA chest negative for dissection  EKG negative for St changes  Trop 2  Most likely costochondritis 2/2 recent URI with coughing  Resolved

## 2021-12-29 NOTE — DISCHARGE SUMMARY
[place]  Discharge- Mariposa Brown 1960, 62 y o  female MRN: [id number]  Unit/Bed#: -01 Encounter: [id number]  Primary Care Provider: Leyla Kraft DO   Date and time admitted to hospital: 12/27/2021  1:11 AM    * Vertigo  Assessment & Plan  Presents with vertigo symptoms for last 24 hours  Describes it as room spinning sensation  Admits getting over a recent URI  Never had similar symptoms in past  CT head negative  No improvement with zofran, meclezine, IVF, and valium  Neurology consulted, appreciate recommendations  Continue to treat vertigo with meclizine 25 mg q 8 hours, and Valium 5 mg IV p r n  , would not utilize these long term  Repeat CT head this AM showed - No acute intracranial abnormality  Patient feeling better today will d/c with prn meclizine and zofran   Will need out patient vestibular therapy, referral made    Atypical chest pain  Assessment & Plan  Presents with pressure like sensation  Worse with breathing, movement or palpation  States pain radiates to back  CTA chest negative for dissection  EKG negative for St changes  Trop 2  Most likely costochondritis 2/2 recent URI with coughing  Resolved    Rectus sheath hematoma  Assessment & Plan  Admits to intermittent LLQ abdominal pain described as sharp in nature  CT abdomen showed 4 5 cm x 2 5 cm acute left rectus sheath hematoma associated asymmetric enlargement of the left rectus muscle, new from the previous study  CTA earlier in the admission did not show hematoma  General surgery consulted  Surgery recommend holding xeralto for 5 days (today is day 1, may resume on Tuesday 1/4/22)  Hg stable 13 4  Abdominal binder ordered    Acute non-recurrent maxillary sinusitis  Assessment & Plan  CT head showed - Right maxillary sinus mucosal thickening with some dependent fluid consistent with acute on chronic right maxillary sinusitis    Scattered mucosal thickening seen elsewhere in the sphenoid sinus and ethmoid air cells  Admits to having maxillary sinus pressure, pt is getting over recent URI  Will start on augmentin 875/125 for 7 days  Hypertension  Assessment & Plan  Resume lisinopril    Pulmonary embolism (HCC)  Assessment & Plan  Hx of pe/dvt s/p ivc filter  Will hold xeralto for 5 days (see under rectus sheath hematoma)      Medical Problems             Resolved Problems  Date Reviewed: 12/30/2021    None              Discharging Physician / Practitioner: Liudmila Billy DO  PCP: Mary Parker DO  Admission Date:   Admission Orders (From admission, onward)     Ordered        12/29/21 1253  Inpatient Admission  Once            12/27/21 0648  Place in Observation  Once                      Discharge Date: 12/30/21    Consultations During Hospital Stay:  · Neurology  · General surgery    Procedures Performed:   · n/a    Significant Findings / Test Results:   · CT head     No acute intracranial abnormality      Right maxillary sinus mucosal thickening with some dependent fluid consistent with acute on chronic right maxillary sinusitis  Scattered mucosal thickening seen elsewhere in the sphenoid sinus and ethmoid air cells  Incidental Findings:   CT abd/pelvis :  IMPRESSION:  4 5 cm x 2 5 cm acute left rectus sheath hematoma associated asymmetric enlargement of the left rectus muscle, new from the previous study  Need for additional imaging to evaluate for active extravasation based on clinical evaluation     Bibasilar density seen, new from the previous study compatible with atelectasis    Test Results Pending at Discharge (will require follow up):   · none     Outpatient Tests Requested:  · n/a    Complications:  none    Reason for Admission: Vertigo    Hospital Course:   Sharron Shanks is a 62 y o  female patient who originally presented to the hospital on 12/27/2021 due to vertigo    Patient presented with vertigo symptoms described as room spinning sensation for the last 24 hours prior to being admitted  Patient also admitted to recently having an upper respiratory infection  CT head in the ED was negative for any acute findings  She received IV Zofran meclizine Valium and IV fluids in the ED without improvement thus patient was admitted under observation  Neurology was consulted who recommended treating vertigo with meclizine 25 mg q 8 hours Valium 5 mg IV p r n  and Zofran p r n   On hospital day 2 patient continued to have symptoms the so repeat CT head was done which did not show any acute findings except for acute sinusitis  Patient does admit have to having sinus pressure and recent URI infection, thus patient was started on Augmentin for a total 7 days  Today patient's vertigo symptoms are improved  Will discharge home with p r n  Meclizine and Zofran  Outpatient referral to ENT for vestibular therapy has been made  While inpatient patient admitted to having intermittent sharp left lower quadrant abdominal pain  CT abdomen with oral contrast was done showing 4 5 cm x 2 5 cm acute left rectus sheath hematoma associated asymmetric enlargement of the left rectus muscle, new from the previous study  General surgery evaluated patient, recommend holding xeralto for 5 days  Today is day one, may resume on 1/4/22  Please see above list of diagnoses and related plan for additional information  Condition at Discharge: good    Discharge Day Visit / Exam:   Subjective:  Patient states that her dizziness is improved and is eager to go home  Vitals: Blood Pressure: 132/80 (12/30/21 0716)  Pulse: 68 (12/30/21 0716)  Temperature: 98 °F (36 7 °C) (12/30/21 0716)  Temp Source: Temporal (12/27/21 1435)  Respirations: 18 (12/30/21 0716)  Height: 5' 3" (160 cm) (12/27/21 0059)  Weight - Scale: 89 1 kg (196 lb 6 9 oz) (12/29/21 0600)  SpO2: 93 % (12/30/21 0745)  Exam:   Physical Exam  Vitals reviewed  HENT:      Head: Normocephalic and atraumatic        Right Ear: External ear normal  Left Ear: External ear normal       Nose: Nose normal       Mouth/Throat:      Mouth: Mucous membranes are moist       Pharynx: Oropharynx is clear  Eyes:      Extraocular Movements: Extraocular movements intact  Cardiovascular:      Rate and Rhythm: Normal rate and regular rhythm  Pulses: Normal pulses  Heart sounds: Normal heart sounds  Pulmonary:      Effort: Pulmonary effort is normal       Breath sounds: Normal breath sounds  Abdominal:      General: Abdomen is flat  Palpations: Abdomen is soft  Tenderness: There is no abdominal tenderness  Musculoskeletal:         General: Normal range of motion  Cervical back: Normal range of motion  Skin:     General: Skin is warm and dry  Neurological:      General: No focal deficit present  Mental Status: She is alert and oriented to person, place, and time  Mental status is at baseline  Psychiatric:         Mood and Affect: Mood normal          Behavior: Behavior normal           Discussion with Family: Updated  (daughter) via phone  Discharge instructions/Information to patient and family:   See after visit summary for information provided to patient and family  Provisions for Follow-Up Care:  See after visit summary for information related to follow-up care and any pertinent home health orders  Disposition:   Home    Planned Readmission: n/a     Discharge Statement:  I spent 45 minutes discharging the patient  This time was spent on the day of discharge  I had direct contact with the patient on the day of discharge  Greater than 50% of the total time was spent examining patient, answering all patient questions, arranging and discussing plan of care with patient as well as directly providing post-discharge instructions  Additional time then spent on discharge activities  Discharge Medications:  See after visit summary for reconciled discharge medications provided to patient and/or family  **Please Note: This note may have been constructed using a voice recognition system**

## 2021-12-29 NOTE — ASSESSMENT & PLAN NOTE
Admits to intermittent LLQ abdominal pain described as sharp in nature  CT abdomen showed 4 5 cm x 2 5 cm acute left rectus sheath hematoma associated asymmetric enlargement of the left rectus muscle, new from the previous study      CTA earlier in the admission did not show hematoma  General surgery consulted  Pt had xeralto this AM, will hold for now  Hg stable 13 9

## 2021-12-30 VITALS
BODY MASS INDEX: 34.8 KG/M2 | WEIGHT: 196.43 LBS | HEIGHT: 63 IN | RESPIRATION RATE: 18 BRPM | OXYGEN SATURATION: 93 % | DIASTOLIC BLOOD PRESSURE: 80 MMHG | SYSTOLIC BLOOD PRESSURE: 132 MMHG | HEART RATE: 68 BPM | TEMPERATURE: 98 F

## 2021-12-30 LAB
ALBUMIN SERPL BCP-MCNC: 3.1 G/DL (ref 3.5–5)
ANION GAP SERPL CALCULATED.3IONS-SCNC: 7 MMOL/L (ref 4–13)
BUN SERPL-MCNC: 11 MG/DL (ref 5–25)
CALCIUM ALBUM COR SERPL-MCNC: 9 MG/DL (ref 8.3–10.1)
CALCIUM SERPL-MCNC: 8.3 MG/DL (ref 8.3–10.1)
CHLORIDE SERPL-SCNC: 105 MMOL/L (ref 100–108)
CO2 SERPL-SCNC: 26 MMOL/L (ref 21–32)
CREAT SERPL-MCNC: 0.76 MG/DL (ref 0.6–1.3)
ERYTHROCYTE [DISTWIDTH] IN BLOOD BY AUTOMATED COUNT: 13.5 % (ref 11.6–15.1)
GFR SERPL CREATININE-BSD FRML MDRD: 85 ML/MIN/1.73SQ M
GLUCOSE SERPL-MCNC: 86 MG/DL (ref 65–140)
HCT VFR BLD AUTO: 42.4 % (ref 34.8–46.1)
HGB BLD-MCNC: 13.4 G/DL (ref 11.5–15.4)
MCH RBC QN AUTO: 29.3 PG (ref 26.8–34.3)
MCHC RBC AUTO-ENTMCNC: 31.6 G/DL (ref 31.4–37.4)
MCV RBC AUTO: 93 FL (ref 82–98)
PHOSPHATE SERPL-MCNC: 4.3 MG/DL (ref 2.3–4.1)
PLATELET # BLD AUTO: 221 THOUSANDS/UL (ref 149–390)
PMV BLD AUTO: 9 FL (ref 8.9–12.7)
POTASSIUM SERPL-SCNC: 4.1 MMOL/L (ref 3.5–5.3)
RBC # BLD AUTO: 4.57 MILLION/UL (ref 3.81–5.12)
SODIUM SERPL-SCNC: 138 MMOL/L (ref 136–145)
WBC # BLD AUTO: 5.93 THOUSAND/UL (ref 4.31–10.16)

## 2021-12-30 PROCEDURE — 80069 RENAL FUNCTION PANEL: CPT | Performed by: STUDENT IN AN ORGANIZED HEALTH CARE EDUCATION/TRAINING PROGRAM

## 2021-12-30 PROCEDURE — 99239 HOSP IP/OBS DSCHRG MGMT >30: CPT | Performed by: STUDENT IN AN ORGANIZED HEALTH CARE EDUCATION/TRAINING PROGRAM

## 2021-12-30 PROCEDURE — 85027 COMPLETE CBC AUTOMATED: CPT | Performed by: STUDENT IN AN ORGANIZED HEALTH CARE EDUCATION/TRAINING PROGRAM

## 2021-12-30 RX ADMIN — LOSARTAN POTASSIUM 25 MG: 25 TABLET, FILM COATED ORAL at 09:36

## 2021-12-30 RX ADMIN — AMOXICILLIN AND CLAVULANATE POTASSIUM 1 TABLET: 875; 125 TABLET, FILM COATED ORAL at 09:36

## 2021-12-30 RX ADMIN — MECLIZINE 25 MG: 12.5 TABLET ORAL at 05:36

## 2021-12-30 RX ADMIN — DIAZEPAM 5 MG: 5 INJECTION, SOLUTION INTRAMUSCULAR; INTRAVENOUS at 15:00

## 2021-12-30 RX ADMIN — MECLIZINE 25 MG: 12.5 TABLET ORAL at 14:47

## 2021-12-30 NOTE — UTILIZATION REVIEW
Inpatient Admission Authorization Request   NOTIFICATION OF INPATIENT ADMISSION/INPATIENT AUTHORIZATION REQUEST   SERVICING FACILITY:   [address] [address]  Tax ID: [id number]  NPI: [id number]  Place of Service: Inpatient [address] [facility]  [address]  Place of Service Code: 24     ATTENDING PROVIDER:  Attending Name and NPI#: Kevyn Ivan [[id number]]  Address: [address] [place] [address]  Phone: [phone]     UTILIZATION REVIEW CONTACT:  Katie Daniels, Utilization   Network Utilization Review Department  Phone: [phone]  Fax [phone]  Email: [email or web address]     PHYSICIAN ADVISORY SERVICES:  FOR [id number]-KP-[id number] REVIEW - MEDICAL NECESSITY DENIAL  Phone: [phone]  Fax: [phone]  Email: [email or web address]     TYPE OF REQUEST:  Inpatient Status     ADMISSION INFORMATION:  ADMISSION DATE/TIME: 12/29/21 12:53 PM  PATIENT DIAGNOSIS CODE/DESCRIPTION:  Hiatal hernia [K44 9]  Vertigo [R42]  Chest pain [R07 9]  Atypical chest pain [R07 89]  DISCHARGE DATE/TIME: No discharge date for patient encounter  DISCHARGE DISPOSITION (IF DISCHARGED): Final discharge disposition not confirmed     IMPORTANT INFORMATION:  Please contact the Katie Daniels directly with any questions or concerns regarding this request  Department voicemails are confidential     Send requests for admission clinical reviews, concurrent reviews, approvals, and administrative denials due to lack of clinical to fax [phone]

## 2021-12-30 NOTE — ASSESSMENT & PLAN NOTE
Admits to intermittent LLQ abdominal pain described as sharp in nature  CT abdomen showed 4 5 cm x 2 5 cm acute left rectus sheath hematoma associated asymmetric enlargement of the left rectus muscle, new from the previous study      CTA earlier in the admission did not show hematoma  General surgery consulted  Surgery recommend holding xeralto for 5 days (today is day 1, may resume on Tuesday 1/4/22)  Hg stable 13 4  Abdominal binder ordered

## 2021-12-30 NOTE — PLAN OF CARE
Problem: Potential for Falls  Goal: Patient will remain free of falls  Description: INTERVENTIONS:  - Educate patient/family on patient safety including physical limitations  - Instruct patient to call for assistance with activity   - Consult OT/PT to assist with strengthening/mobility   - Keep Call bell within reach  - Keep bed low and locked with side rails adjusted as appropriate  - Keep care items and personal belongings within reach  - Initiate and maintain comfort rounds  - Make Fall Risk Sign visible to staff  - Offer Toileting every 2 Hours, in advance of need  - Initiate/Maintain bed alarm  - Obtain necessary fall risk management equipment: sock  - Apply yellow socks and bracelet for high fall risk patients  - Consider moving patient to room near nurses station  12/29/2021 2024 by Mely Carrion RN  Outcome: Progressing     Problem: PAIN - ADULT  Goal: Verbalizes/displays adequate comfort level or baseline comfort level  Description: Interventions:  - Encourage patient to monitor pain and request assistance  - Assess pain using appropriate pain scale  - Administer analgesics based on type and severity of pain and evaluate response  - Implement non-pharmacological measures as appropriate and evaluate response  - Consider cultural and social influences on pain and pain management  - Notify physician/advanced practitioner if interventions unsuccessful or patient reports new pain  12/29/2021 2024 by Mely Carrion RN  Outcome: Progressing     Problem: INFECTION - ADULT  Goal: Absence or prevention of progression during hospitalization  Description: INTERVENTIONS:  - Assess and monitor for signs and symptoms of infection  - Monitor lab/diagnostic results  - Monitor all insertion sites, i e  indwelling lines, tubes, and drains  - Monitor endotracheal if appropriate and nasal secretions for changes in amount and color  - [place] appropriate cooling/warming therapies per order  - Administer medications as ordered  - Instruct and encourage patient and family to use good hand hygiene technique  - Identify and instruct in appropriate isolation precautions for identified infection/condition  12/29/2021 2024 by Cheyenne Burkett RN  Outcome: Progressing     Problem: INFECTION - ADULT  Goal: Absence of fever/infection during neutropenic period  Description: INTERVENTIONS:  - Monitor WBC    12/29/2021 2024 by Cheyenne Burkett RN  Outcome: Progressing     Problem: SAFETY ADULT  Goal: Patient will remain free of falls  Description: INTERVENTIONS:  - Educate patient/family on patient safety including physical limitations  - Instruct patient to call for assistance with activity   - Consult OT/PT to assist with strengthening/mobility   - Keep Call bell within reach  - Keep bed low and locked with side rails adjusted as appropriate  - Keep care items and personal belongings within reach  - Initiate and maintain comfort rounds  - Make Fall Risk Sign visible to staff  - Offer Toileting every 2 Hours, in advance of need  - Initiate/Maintain bed alarm  - Obtain necessary fall risk management equipment: sock  - Apply yellow socks and bracelet for high fall risk patients  - Consider moving patient to room near nurses station  12/29/2021 2024 by Cheyenne Burkett RN  Outcome: Progressing     Problem: SAFETY ADULT  Goal: Maintain or return to baseline ADL function  Description: INTERVENTIONS:  - Educate patient/family on patient safety including physical limitations  - Instruct patient to call for assistance with activity   - Consult OT/PT to assist with strengthening/mobility   - Keep Call bell within reach  - Keep bed low and locked with side rails adjusted as appropriate  - Keep care items and personal belongings within reach  - Initiate and maintain comfort rounds  - Make Fall Risk Sign visible to staff  - Offer Toileting every 2 Hours, in advance of need  - Initiate/Maintain bed alarm  - Obtain necessary fall risk management equipment: socks  - Apply yellow socks and bracelet for high fall risk patients  - Consider moving patient to room near nurses station  12/29/2021 2024 by Birgit Nieves RN  Outcome: Progressing     Problem: SAFETY ADULT  Goal: Maintains/Returns to pre admission functional level  Description: INTERVENTIONS:  - Perform BMAT or MOVE assessment daily    - Set and communicate daily mobility goal to care team and patient/family/caregiver  - Collaborate with rehabilitation services on mobility goals if consulted  - Perform Range of Motion 2 times a day  - Reposition patient every 2 hours  - Dangle patient 2 times a day  - Stand patient 2 times a day  - Ambulate patient 2 times a day  - Out of bed to chair 2 times a day   - Out of bed for meals 2 times a day  - Out of bed for toileting  - Record patient progress and toleration of activity level   12/29/2021 2024 by Birgit Nieves RN  Outcome: Progressing     Problem: DISCHARGE PLANNING  Goal: Discharge to home or other facility with appropriate resources  Description: INTERVENTIONS:  - Identify barriers to discharge w/patient and caregiver  - Arrange for needed discharge resources and transportation as appropriate  - Identify discharge learning needs (meds, wound care, etc )  - Arrange for interpretive services to assist at discharge as needed  - Refer to Case Management Department for coordinating discharge planning if the patient needs post-hospital services based on physician/advanced practitioner order or complex needs related to functional status, cognitive ability, or social support system  12/29/2021 2024 by Birgit Nieves RN  Outcome: Progressing     Problem: Knowledge Deficit  Goal: Patient/family/caregiver demonstrates understanding of disease process, treatment plan, medications, and discharge instructions  Description: Complete learning assessment and assess knowledge base    Interventions:  - Provide teaching at level of understanding  - Provide teaching via preferred learning methods  12/29/2021 2024 by Chance Pena, RN  Outcome: Progressing

## 2021-12-30 NOTE — PLAN OF CARE
Problem: Potential for Falls  Goal: Patient will remain free of falls  Description: INTERVENTIONS:  - Educate patient/family on patient safety including physical limitations  - Instruct patient to call for assistance with activity   - Consult OT/PT to assist with strengthening/mobility   - Keep Call bell within reach  - Keep bed low and locked with side rails adjusted as appropriate  - Keep care items and personal belongings within reach  - Initiate and maintain comfort rounds  - Make Fall Risk Sign visible to staff  - Offer Toileting every 2 Hours, in advance of need  - Initiate/Maintain bed alarm  - Obtain necessary fall risk management equipment: sock  - Apply yellow socks and bracelet for high fall risk patients  - Consider moving patient to room near nurses station  Outcome: Progressing     Problem: PAIN - ADULT  Goal: Verbalizes/displays adequate comfort level or baseline comfort level  Description: Interventions:  - Encourage patient to monitor pain and request assistance  - Assess pain using appropriate pain scale  - Administer analgesics based on type and severity of pain and evaluate response  - Implement non-pharmacological measures as appropriate and evaluate response  - Consider cultural and social influences on pain and pain management  - Notify physician/advanced practitioner if interventions unsuccessful or patient reports new pain  Outcome: Progressing     Problem: INFECTION - ADULT  Goal: Absence or prevention of progression during hospitalization  Description: INTERVENTIONS:  - Assess and monitor for signs and symptoms of infection  - Monitor lab/diagnostic results  - Monitor all insertion sites, i e  indwelling lines, tubes, and drains  - Monitor endotracheal if appropriate and nasal secretions for changes in amount and color  - [place] appropriate cooling/warming therapies per order  - Administer medications as ordered  - Instruct and encourage patient and family to use good hand hygiene technique  - Identify and instruct in appropriate isolation precautions for identified infection/condition  Outcome: Progressing  Goal: Absence of fever/infection during neutropenic period  Description: INTERVENTIONS:  - Monitor WBC     Outcome: Progressing     Problem: SAFETY ADULT  Goal: Patient will remain free of falls  Description: INTERVENTIONS:  - Educate patient/family on patient safety including physical limitations  - Instruct patient to call for assistance with activity   - Consult OT/PT to assist with strengthening/mobility   - Keep Call bell within reach  - Keep bed low and locked with side rails adjusted as appropriate  - Keep care items and personal belongings within reach  - Initiate and maintain comfort rounds  - Make Fall Risk Sign visible to staff  - Offer Toileting every 2 Hours, in advance of need  - Initiate/Maintain bed alarm  - Obtain necessary fall risk management equipment: sock  - Apply yellow socks and bracelet for high fall risk patients  - Consider moving patient to room near nurses station  Outcome: Progressing  Goal: Maintain or return to baseline ADL function  Description: INTERVENTIONS:  - Educate patient/family on patient safety including physical limitations  - Instruct patient to call for assistance with activity   - Consult OT/PT to assist with strengthening/mobility   - Keep Call bell within reach  - Keep bed low and locked with side rails adjusted as appropriate  - Keep care items and personal belongings within reach  - Initiate and maintain comfort rounds  - Make Fall Risk Sign visible to staff  - Offer Toileting every 2 Hours, in advance of need  - Initiate/Maintain bed alarm  - Obtain necessary fall risk management equipment: socks  - Apply yellow socks and bracelet for high fall risk patients  - Consider moving patient to room near nurses station  Outcome: Progressing  Goal: Maintains/Returns to pre admission functional level  Description: INTERVENTIONS:  - Perform BMAT or MOVE assessment daily    - Set and communicate daily mobility goal to care team and patient/family/caregiver  - Collaborate with rehabilitation services on mobility goals if consulted  - Out of bed for toileting  - Record patient progress and toleration of activity level   Outcome: Progressing     Problem: DISCHARGE PLANNING  Goal: Discharge to home or other facility with appropriate resources  Description: INTERVENTIONS:  - Identify barriers to discharge w/patient and caregiver  - Arrange for needed discharge resources and transportation as appropriate  - Identify discharge learning needs (meds, wound care, etc )  - Arrange for interpretive services to assist at discharge as needed  - Refer to Case Management Department for coordinating discharge planning if the patient needs post-hospital services based on physician/advanced practitioner order or complex needs related to functional status, cognitive ability, or social support system  Outcome: Progressing     Problem: Knowledge Deficit  Goal: Patient/family/caregiver demonstrates understanding of disease process, treatment plan, medications, and discharge instructions  Description: Complete learning assessment and assess knowledge base    Interventions:  - Provide teaching at level of understanding  - Provide teaching via preferred learning methods  Outcome: Progressing

## 2021-12-30 NOTE — DISCHARGE INSTRUCTIONS
Please hold xeralto for 5 days in setting of abdominal hematoma  May resume on 1/4/22    Vertigo   WHAT YOU NEED TO KNOW:   Vertigo is a condition that causes you to feel dizzy  You may feel that you or everything around you is moving or spinning  You may also feel like you are being pulled down or toward your side  DISCHARGE INSTRUCTIONS:   Return to the emergency department if:   · You have a headache and a stiff neck  · You have shaking chills and a fever  · You vomit over and over with no relief  · You have blood, pus, or fluid coming out of your ears  · You are confused  Contact your healthcare provider if:   · Your symptoms do not get better with treatment  · You have questions about your condition or care  Medicines:   · Medicine  may be given to help relieve your symptoms  · Take your medicine as directed  Contact your healthcare provider if you think your medicine is not helping or if you have side effects  Tell him or her if you are allergic to any medicine  Keep a list of the medicines, vitamins, and herbs you take  Include the amounts, and when and why you take them  Bring the list or the pill bottles to follow-up visits  Carry your medicine list with you in case of an emergency  Manage your symptoms:   · Do not drive , walk without help, or operate heavy machinery when you are dizzy  · Move slowly  when you move from one position to another position  Get up slowly from sitting or lying down  Sit or lie down right away if you feel dizzy  · Drink plenty of liquids  Liquids help prevent dehydration  Ask how much liquid to drink each day and which liquids are best for you  · Vestibular and balance rehabilitation therapy (VBRT)  is used to teach you exercises to improve your balance and strength  These exercises may help decrease your vertigo and improve your balance  Ask for more information about this therapy      Follow up with your doctor as directed:  Write down your questions so you remember to ask them during your visits  © Copyright [employer] 2021 Information is for End User's use only and may not be sold, redistributed or otherwise used for commercial purposes  All illustrations and images included in CareNotes® are the copyrighted property of A D A M , Inc  or Pastora Matias  The above information is an  only  It is not intended as medical advice for individual conditions or treatments  Talk to your doctor, nurse or pharmacist before following any medical regimen to see if it is safe and effective for you

## 2021-12-30 NOTE — INCIDENTAL FINDINGS
The following findings require follow up:  Radiographic finding   Finding: CT abdomen pelvis wo contrast: 4 5 cm x 2 5 cm acute left rectus sheath hematoma associated asymmetric enlargement of the left rectus muscle, new from the previous study    Need for additional imaging to evaluate for active extravasation based on clinical evaluation, Bibasilar density seen, new from the previous study compatible with atelectasis   Follow up required: PCP   Follow up should be done within 1 week(s)    Please notify the following clinician to assist with the follow up:   Dr Peggy Rodriguez

## 2021-12-30 NOTE — ASSESSMENT & PLAN NOTE
CT head showed - Right maxillary sinus mucosal thickening with some dependent fluid consistent with acute on chronic right maxillary sinusitis  Scattered mucosal thickening seen elsewhere in the sphenoid sinus and ethmoid air cells  Admits to having maxillary sinus pressure, pt is getting over recent URI  Will start on augmentin 875/125 for 7 days

## 2021-12-30 NOTE — UTILIZATION REVIEW
Continued Stay Review    Date: 12/30/21                          Current Patient Class: IP  Current Level of Care:  MS    HPI:61 y o  femalewith hx DVT/PE with IVC filter on Xarelto initially admitted on 12/27/21 as OBS with vertigo converted to IP 12/29 with new rectus sheath hematoma noted on imaging done due to intermittent  Sharp LLQ pain  General surgery consulted  Assessment/Plan: Hgb today 13 4, stable   Xarelto on hold,pt  to resume 1/4/22  Abdominal binder ordered by general surgery  Abdomen soft and flat, no abdominal tenderness   Patient states that her dizziness is improved and is eager to go home  Will discharge with prn meclizine, prn Zofran  Outpatient vestibular therapy      Vital Signs:   Date/Time Temp Pulse Resp BP MAP (mmHg) SpO2   12/30/21 0745 -- -- -- -- -- 93 %   12/30/21 07:16:36 98 °F (36 7 °C) 68 -- 132/80 97 95 %   12/30/21 07:16:21 98 °F (36 7 °C) -- 18 132/80 97 --   12/29/21 21:00:38 98 3 °F (36 8 °C) 73 16 132/79 97 94 %       Pertinent Labs/Diagnostic Results:   Results from last 7 days   Lab Units 12/27/21  0124   SARS-COV-2  Negative     Results from last 7 days   Lab Units 12/30/21  0544 12/29/21  1156 12/28/21  0718 12/27/21  0119   WBC Thousand/uL 5 93  --  8 74 9 30   HEMOGLOBIN g/dL 13 4 13 9 13 7 14 9   HEMATOCRIT % 42 4 43 3 42 9 46 2*   PLATELETS Thousands/uL 221  --  224 309   NEUTROS ABS Thousands/µL  --   --   --  5 11         Results from last 7 days   Lab Units 12/30/21  0544 12/28/21  0718 12/27/21  0119   SODIUM mmol/L 138 138 140   POTASSIUM mmol/L 4 1 3 9 3 9   CHLORIDE mmol/L 105 106 105   CO2 mmol/L 26 26 25   ANION GAP mmol/L 7 6 10   BUN mg/dL 11 13 17   CREATININE mg/dL 0 76 0 68 0 80   EGFR ml/min/1 73sq m 85 95 80   CALCIUM mg/dL 8 3 8 3 9 1   MAGNESIUM mg/dL  --  2 0  --    PHOSPHORUS mg/dL 4 3* 3 4  --      Results from last 7 days   Lab Units 12/30/21  0544 12/28/21  0718 12/27/21  0119   AST U/L  --  13 24   ALT U/L  --  39 56   ALK PHOS U/L  --  47 56 TOTAL PROTEIN g/dL  --  6 3* 7 6   ALBUMIN g/dL 3 1* 3 2* 3 8   TOTAL BILIRUBIN mg/dL  --  0 50 0 40         Results from last 7 days   Lab Units 12/30/21  0544 12/28/21  0718 12/27/21  0119   GLUCOSE RANDOM mg/dL 86 93 126           Results from last 7 days   Lab Units 12/27/21  0119   HS TNI 0HR ng/L 2     Results from last 7 days   Lab Units 12/27/21  0119   D-DIMER QUANTITATIVE ug/ml FEU 0 52*     Results from last 7 days   Lab Units 12/27/21  0119   PROTIME seconds 12 0   INR  0 90   PTT seconds 28             Results from last 7 days   Lab Units 12/27/21  0119   LACTIC ACID mmol/L 1 3             Results from last 7 days   Lab Units 12/27/21  0119   NT-PRO BNP pg/mL 41             Results from last 7 days   Lab Units 12/27/21  0119   LIPASE u/L 88                     Results from last 7 days   Lab Units 12/27/21  0124   INFLUENZA A PCR  Negative   INFLUENZA B PCR  Negative   RSV PCR  Negative           Medications:   Scheduled Medications:  amoxicillin-clavulanate, 1 tablet, Oral, Q12H ELLYN  losartan, 25 mg, Oral, Daily  meclizine, 25 mg, Oral, Q8H ELLYN  ondansetron, 4 mg, Intravenous, Once  rivaroxaban (XARELTO) tablet 20 mg  Dose: 20 mg  Freq: Daily with breakfast Route: PO  Indications of Use: PULMONARY EMBOLISM  Start: 12/27/21 0845 End: 12/29/21 1244    Continuous IV Infusions:  sodium chloride, 75 mL/hr, Intravenous, Continuous      PRN Meds:  acetaminophen, 650 mg, Oral, Q6H PRN  diazepam, 5 mg, Intravenous, Q6H PRN  ondansetron, 4 mg, Intravenous, Q6H PRN        Discharge Plan: D    Network Utilization Review Department  ATTENTION: Please call with any questions or concerns to [phone] and carefully listen to the prompts so that you are directed to the right person   All voicemails are confidential   Ladonna Guevara all requests for admission clinical reviews, approved or denied determinations and any other requests to dedicated fax number below belonging to the campus where the patient is receiving treatment   List of dedicated fax numbers for the Facilities:  [address] [facility] [address] DENIALS (Administrative/Medical Necessity) [phone]   [address]  (Maternity/NICU/Pediatrics) [phone]   [address] [place] [address]  [address] [facility] [place] [address] [address] [place] [address] Maranda [address] [place] [facility] [place] [address] [phone]

## 2022-01-01 NOTE — TELEPHONE ENCOUNTER
Dr Kristi Beavers    [phone]    Patient was seen yesterday  She states that someone had called her  I dont see any notes  Please advise 
Spoke with PT all is good 
Statement Selected

## 2022-01-03 NOTE — UTILIZATION REVIEW
Notification of Discharge   This is a Notification of Discharge from our facility [address]  Please be advised that this patient has been discharge from our facility  Below you will find the admission and discharge date and time including the patients disposition  UTILIZATION REVIEW CONTACT:  Rima Merino  Utilization   Network Utilization Review Department  Phone: [phone] carefully listen to the prompts  All voicemails are confidential   Email: [email or web address]     PHYSICIAN ADVISORY SERVICES:  FOR [facility] REVIEW - MEDICAL NECESSITY DENIAL  Phone: [phone]  Fax: [phone]  Email: [email or web address]     PRESENTATION DATE: 12/27/2021  1:11 AM  OBERVATION ADMISSION DATE:   INPATIENT ADMISSION DATE: 12/29/21 12:53 PM   DISCHARGE DATE: 12/30/2021  3:35 PM  DISPOSITION: Home/Self Care Home/Self Care      IMPORTANT INFORMATION:  Send all requests for admission clinical reviews, approved or denied determinations and any other requests to dedicated fax number below belonging to the campus where the patient is receiving treatment   List of dedicated fax numbers:  [address] DENIALS (Administrative/Medical Necessity) [phone]   [address] (Maternity/NICU/Pediatrics) [phone]   [place] [phone]   [address] [facility] [phone]   [address] [place] [address] [phone]   [facility] [address] [facility] [phone]   [facility]  [phone]   [address] [facility] [address]  [address] [facility] [address] [facility] [phone]

## 2022-01-27 NOTE — PROGRESS NOTES
This patient was seen on 11/15/21  My role is Foot , Ankle, and Wound Specialist    SUBJECTIVE    Chief Complaint:  Great toe pain     Patient ID: Amanda Delgado is a 62 y o  female  Aldean Lowell is here for the  cc of bilateral foot pain  She notes pain in the great toe joint Left great than Right  She has a history of prior bunionectomy 20+ years ago  She now notes pain in shoegear, pain when standing  The following portions of the patient's history were reviewed and updated as appropriate: allergies, current medications, past family history, past medical history, past social history, past surgical history and problem list     Review of Systems   Constitutional: Negative  Respiratory: Negative  Cardiovascular: Negative  Gastrointestinal: Negative  Musculoskeletal: Positive for arthralgias, gait problem and joint swelling  OBJECTIVE      /82   Pulse 70   Wt 88 5 kg (195 lb)   BMI 34 54 kg/m²     Foot/Ankle Musculoskeletal Exam    General      Neurological: alert      General additional comments: I note palpable osteophytes over the dorsal first metatarsophalangeal joints bilaterally  I note pain on end range of motion (dorsiflexion and plantarflexion) of the joints  Neurovascular      Neurovascular - Right        Dorsalis pedis: 2+      Posterior tibial: 2+      Neurovascular - Left        Dorsalis pedis: 2+      Posterior tibial: 2+       Physical Exam  Vitals and nursing note reviewed  Constitutional:       General: She is not in acute distress  Appearance: Normal appearance  She is not ill-appearing, toxic-appearing or diaphoretic  HENT:      Head: Normocephalic and atraumatic  Cardiovascular:      Rate and Rhythm: Normal rate  Pulses: Normal pulses  Dorsalis pedis pulses are 2+ on the right side and 2+ on the left side  Posterior tibial pulses are 2+ on the right side and 2+ on the left side     Pulmonary:      Effort: Pulmonary effort is normal    Musculoskeletal:      Comments: I note palpable osteophytes over the dorsal first metatarsophalangeal joints bilaterally  I note pain on end range of motion (dorsiflexion and plantarflexion) of the joints  Feet:      Right foot:      Protective Sensation: 10 sites tested  10 sites sensed  Left foot:      Protective Sensation: 10 sites tested  10 sites sensed  Skin:     Comments: Well-healed surgical scars noted jesus bilateral 1st MTPJ  Neurological:      Mental Status: She is alert  ASSESSMENT     Diagnoses and all orders for this visit:    Acquired hallux limitus of both feet         Problem List Items Addressed This Visit        Musculoskeletal and Integument    Acquired hallux limitus of both feet - Primary              PLAN  Xrays from 11/8 reviewed; noting a large intermetatarsal angle and residual bunion despite prior head osteotomy  I note some joint narrowing of the medial first MTPJ  The osteotomy is healed without avascular necrosis  Overall, I feel she may need repeat procedure to resolve her great toe pain / bunion pain  I discussed options including wide width shoes, padding, and surgical options including fusion of the great toe joint or revisional lapidus-type bunionectomy  Pros and cons of both compared and post-op courses and risk discussed  She stated she wants to think through her options

## 2022-03-24 ENCOUNTER — TELEPHONE (OUTPATIENT)
Dept: OBGYN CLINIC | Facility: CLINIC | Age: 62
End: 2022-03-24

## 2022-03-24 DIAGNOSIS — M25.561 CHRONIC PAIN OF RIGHT KNEE: ICD-10-CM

## 2022-03-24 DIAGNOSIS — G89.29 CHRONIC PAIN OF RIGHT KNEE: ICD-10-CM

## 2022-03-24 DIAGNOSIS — M25.461 EFFUSION OF RIGHT KNEE: ICD-10-CM

## 2022-03-24 RX ORDER — DIAZEPAM 5 MG/1
TABLET ORAL
Qty: 4 TABLET | Refills: 0 | Status: CANCELLED | OUTPATIENT
Start: 2022-03-24

## 2022-03-24 NOTE — TELEPHONE ENCOUNTER
PT requests 5mg of valium prescribed for anxiety associated with the injection on 3/28  She uses [facility] if approved

## 2022-03-28 ENCOUNTER — OFFICE VISIT (OUTPATIENT)
Dept: OBGYN CLINIC | Facility: CLINIC | Age: 62
End: 2022-03-28
Payer: COMMERCIAL

## 2022-03-28 VITALS — HEIGHT: 63 IN | WEIGHT: 196 LBS | BODY MASS INDEX: 34.73 KG/M2

## 2022-03-28 DIAGNOSIS — M22.2X1 PATELLOFEMORAL PAIN SYNDROME OF RIGHT KNEE: Primary | ICD-10-CM

## 2022-03-28 PROCEDURE — 99214 OFFICE O/P EST MOD 30 MIN: CPT | Performed by: FAMILY MEDICINE

## 2022-03-28 PROCEDURE — 20610 DRAIN/INJ JOINT/BURSA W/O US: CPT | Performed by: FAMILY MEDICINE

## 2022-03-28 RX ADMIN — LIDOCAINE HYDROCHLORIDE 4 ML: 10 INJECTION, SOLUTION INFILTRATION; PERINEURAL at 14:00

## 2022-03-28 RX ADMIN — TRIAMCINOLONE ACETONIDE 40 MG: 40 INJECTION, SUSPENSION INTRA-ARTICULAR; INTRAMUSCULAR at 14:00

## 2022-03-28 NOTE — PROGRESS NOTES
1  Patellofemoral pain syndrome of right knee  Large joint arthrocentesis: R knee     Orders Placed This Encounter   Procedures    Large joint arthrocentesis: R knee        IMAGING STUDIES: (I personally reviewed images in PACS and report):         PAST REPORTS:  CT right leg 10/11/21: There is mild medial tibiofemoral compartment and lateral tibiofemoral compartment osteoarthritis  There is no CT evidence of stress fracture of the right knee  ASSESSMENT/PLAN:  Right Knee Chronic Pain  Right knee effusion  CT Mild medial and lateral OA   Differential diagnosis:   Meniscal tear, OA     PMH:  IVC filter-no MRIs, needle phobia    Repeat X-ray next visit: None    No follow-ups on file  There are no Patient Instructions on file for this visit       __________________________________________________________________________    CHIEF COMPLAINT:  Follow-up right knee pain    HPI:  Joyce Mortimer is a 62 y o  female  who presents for       Visit 03/28/2022:   Uncontrolled  Did have relief for almost 6 months  Last corticosteroid injection 10/27/2021            Review of Systems      Following history reviewed and update:    Past Medical History:   Diagnosis Date    History of DVT (deep vein thrombosis) 2013    History of pulmonary embolism 2013    Hypertension      Past Surgical History:   Procedure Laterality Date    BREAST MASS EXCISION       Social History   Social History     Substance and Sexual Activity   Alcohol Use Yes    Comment: socially     Social History     Substance and Sexual Activity   Drug Use Never     Social History     Tobacco Use   Smoking Status Never Smoker   Smokeless Tobacco Never Used     Family History   Problem Relation Age of Onset    Deep vein thrombosis Father     No Known Problems Sister     Breast cancer Brother     Colon cancer Neg Hx     Colon polyps Neg Hx      Allergies   Allergen Reactions    Aspirin     Fluticasone           Physical Exam  Ht 5' 3" (1 6 m)   Wt 88 9 kg (196 lb)   BMI 34 72 kg/m²     Constitutional:  see vital signs  Gen: well-developed, normocephalic/atraumatic, well-groomed  Eyes: No inflammation or discharge of conjunctiva or lids; sclera clear   Pharynx: no inflammation, lesion, or mass of lips  Neck: supple, no masses, non-distended  MSK: no inflammation, lesion, mass, or clubbing of nails and digits except for other than mentioned below  SKIN: no visible rashes or skin lesions  Pulmonary/Chest: Effort normal  No respiratory distress  NEURO: cranial nerves grossly intact  PSYCH:  Alert and oriented to person, place, and time; recent and remote memory intact; mood normal, no depression, anxiety, or agitation, judgment and insight good and intact     Ortho Exam  RIGHT KNEE:  Erythema: no  Swelling: no  Increased Warmth: no      __________________________________________________________________________  Large joint arthrocentesis: R knee  Universal Protocol:  Consent: Verbal consent obtained  Risks and benefits: risks, benefits and alternatives were discussed  Consent given by: patient  Time out: Immediately prior to procedure a "time out" was called to verify the correct patient, procedure, equipment, support staff and site/side marked as required    Patient understanding: patient states understanding of the procedure being performed  Site marked: the operative site was marked  Patient identity confirmed: verbally with patient    Supporting Documentation  Indications: pain and diagnostic evaluation   Procedure Details  Location: knee - R knee  Preparation: Patient was prepped and draped in the usual sterile fashion  Needle size: 22 G  Ultrasound guidance: no  Approach: anterolateral  Medications administered: 4 mL lidocaine 1 %; 40 mg triamcinolone acetonide 40 mg/mL    Patient tolerance: patient tolerated the procedure well with no immediate complications  Dressing:  Sterile dressing applied

## 2022-04-01 RX ORDER — TRIAMCINOLONE ACETONIDE 40 MG/ML
40 INJECTION, SUSPENSION INTRA-ARTICULAR; INTRAMUSCULAR
Status: COMPLETED | OUTPATIENT
Start: 2022-03-28 | End: 2022-03-28

## 2022-04-01 RX ORDER — LIDOCAINE HYDROCHLORIDE 10 MG/ML
4 INJECTION, SOLUTION INFILTRATION; PERINEURAL
Status: COMPLETED | OUTPATIENT
Start: 2022-03-28 | End: 2022-03-28

## 2022-05-06 ENCOUNTER — OFFICE VISIT (OUTPATIENT)
Dept: OBGYN CLINIC | Facility: CLINIC | Age: 62
End: 2022-05-06
Payer: COMMERCIAL

## 2022-05-06 VITALS
BODY MASS INDEX: 35.79 KG/M2 | HEIGHT: 63 IN | WEIGHT: 202 LBS | SYSTOLIC BLOOD PRESSURE: 124 MMHG | DIASTOLIC BLOOD PRESSURE: 80 MMHG

## 2022-05-06 DIAGNOSIS — M22.2X1 PATELLOFEMORAL DISORDER OF RIGHT KNEE: ICD-10-CM

## 2022-05-06 DIAGNOSIS — M23.91 INTERNAL DERANGEMENT OF KNEE JOINT, RIGHT: Primary | ICD-10-CM

## 2022-05-06 DIAGNOSIS — M25.561 CHRONIC PAIN OF RIGHT KNEE: ICD-10-CM

## 2022-05-06 DIAGNOSIS — G89.29 CHRONIC PAIN OF RIGHT KNEE: ICD-10-CM

## 2022-05-06 PROCEDURE — 99213 OFFICE O/P EST LOW 20 MIN: CPT | Performed by: ORTHOPAEDIC SURGERY

## 2022-05-06 RX ORDER — DIAZEPAM 5 MG/1
5 TABLET ORAL
Qty: 2 TABLET | Refills: 0 | Status: SHIPPED | OUTPATIENT
Start: 2022-05-06 | End: 2022-05-25 | Stop reason: SDUPTHER

## 2022-05-06 NOTE — PROGRESS NOTES
Ortho Sports Medicine Knee New Patient Visit     Assesment:   62 y o  female right knee possible medial meniscus tear    Plan:    Conservative treatment:    Ice to knee for 20 minutes at least 1-2 times daily  OTC NSAIDS prn for pain  Imaging: All imaging from today was reviewed by myself and explained to the patient  We will obtain a CT arthrogram to r/o medial meniscus tear     Follow up in 1-2 weeks for MRI review  Will make a definitive treatment plan based on the results of the MRI  Injection:    Could consider visco supplementation injection in the future        Surgery:     No surgery is recommended at this point, continue with conservative treatment plan as noted  Follow up:    Return for 1 week after CT arthrogram          Chief Complaint   Patient presents with    Right Knee - Pain       History of Present Illness: The patient is a 62 y o  female whose occupation is unemployed, referred to me by [facility] regarding right knee pain  Pain is located anterior, posterior, lateral, medial   The patient rates the pain as a 8/10  The pain has been present for 1+ years  The patient denies specific, memorable injury or twisting pivoting type motion that started the pain  Patient reports that the pain started a year and half ago when she worked at a [employer]  She states that the pain started no where in progressively got worse  She states that she was unable to squat in stock shelves or do any twisting or pivoting type motions  Patient was initially seen examined by Dr Tatum Marquez who is now referred the patient to me regarding this right knee pain  Patient reports that she has tried to corticosteroid injections in the past   She states that the 1st 1 provided her with moderate improvement for many months but the 2nd injection did not provide her with any improvement    Patient also states that she had a reaction of hives and facial swelling due to a cortisone injection  Patient reports that she has also done physical therapy in the past but very difficult to get through sessions due to this pain  She states that the pain is generally about the knee but worse about the medial aspect  Patient reports that she is unable to use stairs or squat or pivot due to increased pain within the knee  Patient also reports that she has pain at night that will wake her up from sleep due to her being a  and bueno  The pain is characterized as sharp, stabbing, dull, achy  The pain is present at all times  Pain is improved by rest   Pain is aggravated by stairs, squatting, weight bearing, walking, standing and pivoting on a planted foot  Symptoms include clicking, catching and swelling  The patient has tried rest, ice, NSAIDS, physical therapy, bracing and injection  Knee Surgical History:  None    Past Medical, Social and Family History:  Past Medical History:   Diagnosis Date    History of DVT (deep vein thrombosis) 2013    History of pulmonary embolism 2013    Hypertension      Past Surgical History:   Procedure Laterality Date    BREAST MASS EXCISION       Allergies   Allergen Reactions    Aspirin     Fluticasone      Current Outpatient Medications on File Prior to Visit   Medication Sig Dispense Refill    Diclofenac Sodium (VOLTAREN) 1 % Apply 2 g topically 4 (four) times a day 150 g 2    Diclofenac Sodium (VOLTAREN) 1 % apply 2 grams to affected area four times a day 400 g 1    losartan (COZAAR) 25 mg tablet       meclizine (ANTIVERT) 25 mg tablet Take 1 tablet (25 mg total) by mouth every 8 (eight) hours as needed for dizziness 30 tablet 0    ondansetron (Zofran ODT) 4 mg disintegrating tablet Take 1 tablet (4 mg total) by mouth every 6 (six) hours as needed for nausea or vomiting (Vertigo symptoms) 20 tablet 0    Sod Picosulfate-Mag Ox-Cit Acd (Clenpiq) 10-3 5-12 MG-GM -GM/160ML SOLN Take as directed   320 mL 0    terbinafine (LamISIL) 1 % cream APPLY TOPICALLY TWICE A DAY AS NEEDED FOR RASH; USE ON ARMS FOR ONE WEEK      XARELTO 20 MG tablet       [DISCONTINUED] diazepam (VALIUM) 5 mg tablet Take 1 tablet (5 mg total) by mouth once in imaging for anxiety for up to 2 doses 2 tablet 0     No current facility-administered medications on file prior to visit  Social History     Socioeconomic History    Marital status: Single     Spouse name: Not on file    Number of children: Not on file    Years of education: Not on file    Highest education level: Not on file   Occupational History    Not on file   Tobacco Use    Smoking status: Never Smoker    Smokeless tobacco: Never Used   Vaping Use    Vaping Use: Never used   Substance and Sexual Activity    Alcohol use: Yes     Comment: socially    Drug use: Never    Sexual activity: Not on file   Other Topics Concern    Not on file   Social History Narrative    Not on file     Social Determinants of Health     Financial Resource Strain: Not on file   Food Insecurity: No Food Insecurity    Worried About Running Out of Food in the Last Year: Never true    [address] in the Last Year: Never true   Transportation Needs: No Transportation Needs    Lack of Transportation (Medical): No    Lack of Transportation (Non-Medical): No   Physical Activity: Not on file   Stress: Not on file   Social Connections: Not on file   Intimate Partner Violence: Not on file   Housing Stability: Low Risk     Unable to Pay for Housing in the Last Year: No    Number of Places Lived in the Last Year: 1    Unstable Housing in the Last Year: No         I have reviewed the past medical, surgical, social and family history, medications and allergies as documented in the EMR  Review of systems: ROS is negative other than that noted in the HPI  Constitutional: Negative for fatigue and fever  HENT: Negative for sore throat  Respiratory: Negative for shortness of breath      Cardiovascular: Negative for chest pain    Gastrointestinal: Negative for abdominal pain  Endocrine: Negative for cold intolerance and heat intolerance  Genitourinary: Negative for flank pain  Musculoskeletal: Negative for back pain  Skin: Negative for rash  Allergic/Immunologic: Negative for immunocompromised state  Neurological: Negative for dizziness  Psychiatric/Behavioral: Negative for agitation  Physical Exam:    Blood pressure 124/80, height 5' 3" (1 6 m), weight 91 6 kg (202 lb)  General/Constitutional: NAD, well developed, well nourished  HENT: Normocephalic, atraumatic  CV: Intact distal pulses, regular rate  Resp: No respiratory distress or labored breathing  Lymphatic: No lymphadenopathy palpated  Neuro: Alert and Oriented x 3, no focal deficits  Psych: Normal mood, normal affect, normal judgement, normal behavior  Skin: Warm, dry, no rashes, no erythema      Knee Exam (focused): RIGHT LEFT   ROM:   0-130 0-130   Palpation: Effusion negative negative     MJL tenderness Positive Negative     LJL tenderness Positive Negative   Meniscus: Jc Positive Negative    Apley's Compression Positive Negative   Instability: Varus stable stable     Valgus Stable, pain medially with stress stable   Special Tests: Lachman Negative Negative     Posterior drawer Negative Negative     Anterior drawer Negative Negative     Pivot shift not tested not tested     Dial not tested not tested   Patella: Palpation no tenderness no tenderness     Mobility 1/4 1/4     Apprehension Negative Negative   Other: Single leg 1/4 squat not tested not tested      LE NV Exam: +2 DP/PT pulses bilaterally  Sensation intact to light touch L2-S1 bilaterally     Bilateral hip ROM demonstrates no pain actively or passively    No calf tenderness to palpation bilaterally    Knee Imaging    X-rays of the right knee were reviewed, which demonstrate no acute fracture or osseous abnormality    I have reviewed the radiology report and agree with their impression

## 2022-05-24 ENCOUNTER — TELEPHONE (OUTPATIENT)
Dept: OBGYN CLINIC | Facility: HOSPITAL | Age: 62
End: 2022-05-24

## 2022-05-24 ENCOUNTER — HOSPITAL ENCOUNTER (OUTPATIENT)
Dept: RADIOLOGY | Facility: HOSPITAL | Age: 62
Discharge: HOME | End: 2022-05-24

## 2022-05-24 NOTE — TELEPHONE ENCOUNTER
DR Lund    Patient got lost on her way to have her MRI this morning  Patient needed to reschedule appointment  Patient called here to reschedule her MRI  Follow up with Dr Ade Raymundo  Patient will need refill on her diazepam since she did not have MRI  Done this a m  She is rescheduled for July  Pt contacted Call Center requested refill of their medication  Medication Name: diazepam      Dosage of Med: 5 mg       Frequency of Med: one time before imaging       Remaining Medication: 0      [facility] [place]         Pt  Preferred Callback Phone [phone]      Thank you

## 2022-05-25 DIAGNOSIS — M22.2X1 PATELLOFEMORAL DISORDER OF RIGHT KNEE: ICD-10-CM

## 2022-05-25 RX ORDER — DIAZEPAM 5 MG/1
5 TABLET ORAL
Qty: 1 TABLET | Refills: 0 | Status: SHIPPED | OUTPATIENT
Start: 2022-05-25 | End: 2022-06-10 | Stop reason: SDUPTHER

## 2022-06-10 DIAGNOSIS — M22.2X1 PATELLOFEMORAL DISORDER OF RIGHT KNEE: ICD-10-CM

## 2022-06-10 RX ORDER — DIAZEPAM 5 MG/1
5 TABLET ORAL
Qty: 1 TABLET | Refills: 0 | Status: SHIPPED | OUTPATIENT
Start: 2022-06-10

## 2022-06-20 DIAGNOSIS — M70.61 TROCHANTERIC BURSITIS OF RIGHT HIP: ICD-10-CM

## 2022-06-20 DIAGNOSIS — M22.2X1 PATELLOFEMORAL PAIN SYNDROME OF RIGHT KNEE: ICD-10-CM

## 2022-07-12 ENCOUNTER — HOSPITAL ENCOUNTER (OUTPATIENT)
Dept: RADIOLOGY | Facility: HOSPITAL | Age: 62
Discharge: HOME | End: 2022-07-12
Payer: COMMERCIAL

## 2022-07-12 ENCOUNTER — HOSPITAL ENCOUNTER (OUTPATIENT)
Dept: RADIOLOGY | Facility: HOSPITAL | Age: 62
Discharge: HOME | End: 2022-07-12
Admitting: RADIOLOGY
Payer: COMMERCIAL

## 2022-07-12 DIAGNOSIS — M23.91 INTERNAL DERANGEMENT OF KNEE JOINT, RIGHT: ICD-10-CM

## 2022-07-12 PROCEDURE — 77002 NEEDLE LOCALIZATION BY XRAY: CPT

## 2022-07-12 PROCEDURE — 27369 NJX CNTRST KNE ARTHG/CT/MRI: CPT

## 2022-07-12 PROCEDURE — G1004 CDSM NDSC: HCPCS

## 2022-07-12 PROCEDURE — 73701 CT LOWER EXTREMITY W/DYE: CPT

## 2022-07-12 RX ORDER — LIDOCAINE HYDROCHLORIDE 10 MG/ML
5 INJECTION, SOLUTION EPIDURAL; INFILTRATION; INTRACAUDAL; PERINEURAL
Status: COMPLETED | OUTPATIENT
Start: 2022-07-12 | End: 2022-07-12

## 2022-07-12 RX ORDER — SODIUM CHLORIDE 9 MG/ML
20 INJECTION INTRAVENOUS
Status: COMPLETED | OUTPATIENT
Start: 2022-07-12 | End: 2022-07-12

## 2022-07-12 RX ADMIN — SODIUM CHLORIDE 20 ML: 9 INJECTION, SOLUTION INTRAMUSCULAR; INTRAVENOUS; SUBCUTANEOUS at 13:40

## 2022-07-12 RX ADMIN — IOHEXOL 20 ML: 300 INJECTION, SOLUTION INTRAVENOUS at 13:40

## 2022-07-12 RX ADMIN — LIDOCAINE HYDROCHLORIDE 5 ML: 10 INJECTION, SOLUTION EPIDURAL; INFILTRATION; INTRACAUDAL; PERINEURAL at 13:40

## 2022-07-13 ENCOUNTER — TELEPHONE (OUTPATIENT)
Dept: GASTROENTEROLOGY | Facility: CLINIC | Age: 62
End: 2022-07-13

## 2022-07-13 ENCOUNTER — OFFICE VISIT (OUTPATIENT)
Dept: GASTROENTEROLOGY | Facility: CLINIC | Age: 62
End: 2022-07-13
Payer: COMMERCIAL

## 2022-07-13 VITALS
WEIGHT: 202 LBS | HEIGHT: 63 IN | DIASTOLIC BLOOD PRESSURE: 82 MMHG | BODY MASS INDEX: 35.79 KG/M2 | HEART RATE: 70 BPM | SYSTOLIC BLOOD PRESSURE: 128 MMHG

## 2022-07-13 DIAGNOSIS — Z79.01 ENCOUNTER FOR CURRENT LONG-TERM USE OF ANTICOAGULANTS: ICD-10-CM

## 2022-07-13 DIAGNOSIS — I27.82 CHRONIC PULMONARY EMBOLISM, UNSPECIFIED PULMONARY EMBOLISM TYPE, UNSPECIFIED WHETHER ACUTE COR PULMONALE PRESENT (HCC): ICD-10-CM

## 2022-07-13 DIAGNOSIS — Z83.71 FAMILY HISTORY OF COLONIC POLYPS: ICD-10-CM

## 2022-07-13 DIAGNOSIS — R11.0 NAUSEA: Primary | ICD-10-CM

## 2022-07-13 DIAGNOSIS — Z83.71 FAMILY HISTORY OF COLONIC POLYPS: Primary | ICD-10-CM

## 2022-07-13 DIAGNOSIS — R10.32 LLQ ABDOMINAL PAIN: ICD-10-CM

## 2022-07-13 PROCEDURE — 99214 OFFICE O/P EST MOD 30 MIN: CPT | Performed by: INTERNAL MEDICINE

## 2022-07-13 RX ORDER — SODIUM PICOSULFATE, MAGNESIUM OXIDE, AND ANHYDROUS CITRIC ACID 10; 3.5; 12 MG/160ML; G/160ML; G/160ML
LIQUID ORAL
Qty: 320 ML | Refills: 0 | Status: SHIPPED | COMMUNITY
Start: 2022-07-13 | End: 2022-10-25 | Stop reason: HOSPADM

## 2022-07-13 NOTE — LETTER
July 14, 2022     DO Dm Villasenor Zullyjoe  [address] [facility]    Patient: Sandy Mann   YOB: 1960   Date of Visit: 7/13/2022       Dear Dr Marita Patiño: Thank you for referring Nancye Snellen to me for evaluation  Below are my notes for this consultation  If you have questions, please do not hesitate to call me  I look forward to following your patient along with you  Sincerely,        Ebony Dent MD        CC: No Recipients  Ebony Dent MD  7/14/2022 12:19 PM  Incomplete  [facility] - Outpatient Follow-up Note  Sandy Mann 65 y o  female MRN: [id number]  Encounter: [id number]    ASSESSMENT AND PLAN:      1  Nausea  --Patient with ongoing intermittent nausea  Not always related to food intake  No new medications  Patient did have a lot of problems with nausea when she was hospitalized with vertigo in late December 2021  This is different  Not on any nonsteroidals  Does have some mild tenderness on palpation in the epigastric area  Does not have much in the way of pain  Because of ongoing symptoms will do EGD  - EGD; Future-[facility]    2  Family history of colonic polyps  - patient with some increased risk  Other family members but patient's daughter who was in her 45s has had advanced polyps  Patient's last colonoscopy was in 2016 which was negative  Patient does have a sense of not being able to fully evacuate although she does not have any issues with blood per rectum    - Colonoscopy; Future-[facility]    3  Encounter for current long-term use of anticoagulants  -- patient is 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  - patient with a family history of clotting diathesis  Patient had DVT and PE about 10 years ago  - has some protection with an IVC filter     - will plan to have  Xarelto on hold for 48 hours prior to procedure    4   Chronic pulmonary embolism, unspecified pulmonary embolism type, unspecified whether acute cor pulmonale present ([facility] [address] )  -- stable at this time  No problems with shortness of breath    5  LLQ abdominal pain    --- patient with ongoing problems with left-sided abdominal pain  It is intermittent not there all the time but has been persistent  It was felt to be related to a rectus sheath hematoma which was noted in the hospital in late December  Pain is really not away  Patient has had hysterectomy but her ovaries are in  Will obtain CT scan for   - CT abdomen pelvis wo contrast; Future      Followup Appointment: 3-4 mo   ______________________________________________________________________    Chief Complaint   Patient presents with    Nausea, bloating     HPI:  Patient returns to the office for GI follow-up  She was seen in the office late last fall for pre colonoscopy visit as she was due for recall colonoscopy in late 2021  Unfortunately this was not carried out as patient had hospitalization for a rectus sheath hematoma related to her anticoagulation  Patient does have a history of DVT and PE diagnosed over 10 years ago  Patient went to reschedule the procedure around this time  In the antrum patient has had ongoing problems with nausea  Interestingly, patient had fairly severe nausea when she had vertigo around the time of her hospitalization in December  This sensation of nausea somewhat different however  Patient denies any dysphagia or odynophagia or significant heartburn at this time  This occurs multiple times a week  It is not necessarily related to eating  Patient has not had vomiting except for 1 occasion  She denies any major issues with upper abdominal pain  Patient has tried Zofran for nausea but this tends to make her constipated    She is on occasional topical nonsteroidal cream       ______________________________________________________________________      Historical Information   Past Medical History:   Diagnosis Date    History of DVT (deep vein thrombosis) 2013    History of pulmonary embolism 2013    Hypertension      Past Surgical History:   Procedure Laterality Date    BREAST MASS EXCISION      COLONOSCOPY      FL INJECTION RIGHT KNEE (ARTHROGRAM)  07/12/2022     Social History     Substance and Sexual Activity   Alcohol Use Yes    Comment: 1-2 x month     Social History     Substance and Sexual Activity   Drug Use Never     Social History     Tobacco Use   Smoking Status Never Smoker   Smokeless Tobacco Never Used     Family History   Problem Relation Age of Onset    Deep vein thrombosis Father     No Known Problems Sister     Breast cancer Brother     Colon cancer Neg Hx     Colon polyps Neg Hx          Current Outpatient Medications:     diazepam (VALIUM) 5 mg tablet    Diclofenac Sodium (VOLTAREN) 1 %    losartan (COZAAR) 25 mg tablet    meclizine (ANTIVERT) 25 mg tablet    ondansetron (Zofran ODT) 4 mg disintegrating tablet    terbinafine (LamISIL) 1 % cream    XARELTO 20 MG tablet    Sod Picosulfate-Mag Ox-Cit Acd (Clenpiq) 10-3 5-12 MG-GM -GM/160ML SOLN  Allergies   Allergen Reactions    Aspirin     Fluticasone      Reviewed medications and allergies and updated as indicated    Historical Information   Past Medical History:   Diagnosis Date    History of DVT (deep vein thrombosis) 2013    History of pulmonary embolism 2013    Hypertension      Past Surgical History:   Procedure Laterality Date    BREAST MASS EXCISION      FL INJECTION RIGHT KNEE (ARTHROGRAM)  7/12/2022     Social History     Substance and Sexual Activity   Alcohol Use Yes    Comment: 1-2 x month     Social History     Substance and Sexual Activity   Drug Use Never     Social History     Tobacco Use   Smoking Status Never Smoker   Smokeless Tobacco Never Used     Family History   Problem Relation Age of Onset    Deep vein thrombosis Father     No Known Problems Sister     Breast cancer Brother     Colon cancer Neg Hx     Colon polyps Neg Hx Current Outpatient Medications:     diazepam (VALIUM) 5 mg tablet    Diclofenac Sodium (VOLTAREN) 1 %    losartan (COZAAR) 25 mg tablet    meclizine (ANTIVERT) 25 mg tablet    ondansetron (Zofran ODT) 4 mg disintegrating tablet    terbinafine (LamISIL) 1 % cream    XARELTO 20 MG tablet  Allergies   Allergen Reactions    Aspirin     Fluticasone      Reviewed medications and allergies and updated as indicated    PHYSICAL EXAM:    Blood pressure 128/82, pulse 70, height 5' 3" (1 6 m), weight 91 6 kg (202 lb)  Body mass index is 35 78 kg/m²  General Appearance: NAD, cooperative, alert  Eyes: Anicteric, conjunctiva pink   ENT:  Normocephalic, atraumatic, normal mucosa  Neck:  Supple, symmetrical, trachea midline  Resp:  Clear to auscultation bilaterally; no rales, rhonchi or wheezing; respirations unlabored   CV:  S1 S2, Regular rate and rhythm; no murmur, rub, or gallop  GI:  mild epigastric and left lower quadrant tenderness - no guarding or rebound , non-distended; normal bowel sounds; no masses, no organomegaly   Rectal: Deferred  Musculoskeletal: No cyanosis, clubbing or edema  Normal ROM    Skin:  No jaundice, rashes, or lesions   Heme/Lymph: No palpable cervical lymphadenopathy  Psych: Normal affect, good eye contact  Neuro: No gross deficits, AAOx3    Lab Results:   Lab Results   Component Value Date    WBC 5 93 12/30/2021    HGB 13 4 12/30/2021    HCT 42 4 12/30/2021    MCV 93 12/30/2021     12/30/2021     Lab Results   Component Value Date    K 4 1 12/30/2021     12/30/2021    CO2 26 12/30/2021    BUN 11 12/30/2021    CREATININE 0 76 12/30/2021    GLUF 93 12/28/2021    CALCIUM 8 3 12/30/2021    CORRECTEDCA 9 0 12/30/2021    AST 13 12/28/2021    ALT 39 12/28/2021    ALKPHOS 47 12/28/2021    EGFR 85 12/30/2021     No results found for: IRON, TIBC, FERRITIN  Lab Results   Component Value Date    LIPASE 88 12/27/2021         Shirley Dong MD  7/13/2022  3:12 PM  Sign when Signing Visit  [address] [facility] - Outpatient Follow-up Note  Allyson Tucker 65 y o  female MRN: [id number]  Encounter: [id number]    ASSESSMENT AND PLAN:      1  Nausea  --Patient with ongoing intermittent nausea  Not always related to food intake  No new medications  Patient did have a lot of problems with nausea when she was hospitalized with vertigo in late December 2021  This is different  Not on any nonsteroidals  Does have some mild tenderness on palpation in the epigastric area  Does not have much in the way of pain  Because of ongoing symptoms will do EGD  - EGD; Future-[facility]    2  Family history of colonic polyps  - patient with some increased risk  Other family members but patient's daughter who was in her 45s has had advanced polyps  Patient's last colonoscopy was in 2016 which was negative  Patient does have a sense of not being able to fully evacuate although she does not have any issues with blood per rectum    - Colonoscopy; Future--[facility]    3  Encounter for current long-term use of anticoagulants  -- patient is 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  - patient with a family history of clotting diathesis  Patient had DVT and PE about 10 years ago  - has some protection with an IVC filter    - will plan to have  Xarelto on hold for 48 hours prior to procedure    4  Chronic pulmonary embolism, unspecified pulmonary embolism type, unspecified whether acute cor pulmonale present ([facility] [address] )  -- stable at this time  No problems with shortness of breath    5  LLQ abdominal pain    --- patient with ongoing problems with left-sided abdominal pain  It is intermittent not there all the time but has been persistent  It was felt to be related to a rectus sheath hematoma which was noted in the hospital in late December  Pain is really not away  Patient has had hysterectomy but her ovaries are in    Will obtain CT scan for   - CT abdomen pelvis wo contrast; Future      Followup Appointment: 3-4 mo   ______________________________________________________________________    Chief Complaint   Patient presents with    Nausea, bloating     HPI:  ***    Historical Information   Past Medical History:   Diagnosis Date    History of DVT (deep vein thrombosis) 2013    History of pulmonary embolism 2013    Hypertension      Past Surgical History:   Procedure Laterality Date    BREAST MASS EXCISION      FL INJECTION RIGHT KNEE (ARTHROGRAM)  7/12/2022     Social History     Substance and Sexual Activity   Alcohol Use Yes    Comment: 1-2 x month     Social History     Substance and Sexual Activity   Drug Use Never     Social History     Tobacco Use   Smoking Status Never Smoker   Smokeless Tobacco Never Used     Family History   Problem Relation Age of Onset    Deep vein thrombosis Father     No Known Problems Sister     Breast cancer Brother     Colon cancer Neg Hx     Colon polyps Neg Hx          Current Outpatient Medications:     diazepam (VALIUM) 5 mg tablet    Diclofenac Sodium (VOLTAREN) 1 %    losartan (COZAAR) 25 mg tablet    meclizine (ANTIVERT) 25 mg tablet    ondansetron (Zofran ODT) 4 mg disintegrating tablet    terbinafine (LamISIL) 1 % cream    XARELTO 20 MG tablet  Allergies   Allergen Reactions    Aspirin     Fluticasone      Reviewed medications and allergies and updated as indicated    PHYSICAL EXAM:    Blood pressure 128/82, pulse 70, height 5' 3" (1 6 m), weight 91 6 kg (202 lb)  Body mass index is 35 78 kg/m²  General Appearance: NAD, cooperative, alert  Eyes: Anicteric, PERRLA, EOMI  ENT:  Normocephalic, atraumatic, normal mucosa  Neck:  Supple, symmetrical, trachea midline  Resp:  Clear to auscultation bilaterally; no rales, rhonchi or wheezing; respirations unlabored   CV:  S1 S2, Regular rate and rhythm; no murmur, rub, or gallop    GI:  Soft, non-tender, non-distended; normal bowel sounds; no masses, no organomegaly   Rectal: Deferred  Musculoskeletal: No cyanosis, clubbing or edema  Normal ROM  Skin:  No jaundice, rashes, or lesions   Heme/Lymph: No palpable cervical lymphadenopathy  Psych: Normal affect, good eye contact  Neuro: No gross deficits, AAOx3    Lab Results:   Lab Results   Component Value Date    WBC 5 93 12/30/2021    HGB 13 4 12/30/2021    HCT 42 4 12/30/2021    MCV 93 12/30/2021     12/30/2021     Lab Results   Component Value Date    K 4 1 12/30/2021     12/30/2021    CO2 26 12/30/2021    BUN 11 12/30/2021    CREATININE 0 76 12/30/2021    GLUF 93 12/28/2021    CALCIUM 8 3 12/30/2021    CORRECTEDCA 9 0 12/30/2021    AST 13 12/28/2021    ALT 39 12/28/2021    ALKPHOS 47 12/28/2021    EGFR 85 12/30/2021     No results found for: IRON, TIBC, FERRITIN  Lab Results   Component Value Date    LIPASE 88 12/27/2021       Radiology Results:   CT knee right arthrogram    Result Date: 7/13/2022  Narrative: CT RIGHT KNEE ARTHROGRAM INDICATION:   M23 91: Unspecified internal derangement of right knee  COMPARISON:  None  TECHNIQUE:  Serial axial CT images were obtained of the right knee following intra-articular administration of contrast material   Coronal and sagittal reformatted images were also acquired  This examination, like all CT scans performed in the [facility], was performed utilizing techniques to minimize radiation dose exposure, including the use of iterative reconstruction and automated exposure control  Rad dose 273 mGy-cm FINDINGS: MENISCI:  Undersurface fraying is noted of the medial meniscus posterior horn series 401/33, may represent a focal tear CRUCIATE LIGAMENTS: Limited assessment by CT technique without gross evidence of tear  EXTENSOR APPARATUS: Limited assessment by CT technique without gross evidence of tear  COLLATERAL LIGAMENTS: Limited assessment by CT technique without gross evidence of tear   ARTICULAR SURFACES: Patellofemoral: Focal deep fissure at the lateral patellar facet Medial compartment: Broad areas of > 50% loss of cartilage throughout the weightbearing and posterior weightbearing portions of the femoral condyle with underlying sclerosis, and deep fissuring at the periphery of the tibial plateau  Lateral compartment: Intact OSSEOUS STRUCTURES:  Normal      Impression: 1  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 2  Possible tear at the meniscus posterior horn Workstation performed: [id number]     FL injection right knee (arthrogram)    Result Date: 7/13/2022  Narrative: RIGHT KNEE ARTHROGRAM INDICATION:   M23 91: Unspecified internal derangement of right knee  Patient presents with prescription for right knee arthrogram pre-CAT scan  COMPARISON:   Plain films right knee 6/7/2021  CT lower extremity right knee 9/25/2021  FLUOROSCOPY TIME:   3 seoconds IMAGES:  5 FINDINGS: After the risks and benefits of the procedure were thoroughly explained, informed consent was obtained  The patient verbalized expressed understanding of the above risks and wished to proceed with the procedure  Final standard "time-out" procedure performed  The patient was prepped and draped in the usual sterile fashion  5 mL of 1% lidocaine solution was utilized for local anesthesia  Intermittent fluoroscopy was utilized for placement of a 20 gauge 3 5 inch spinal needle within the right knee joint  45 mL of 1:1 dilution of Omnipaque 300 to sterile normal saline was injected into the joint  The patient tolerated the procedure well  There were no complications  I asked the patient to call us with any questions, concerns, or acute problems  The patient expressed understanding of the above  The patient will report for CT imaging of the right knee  Impression: Successful knee arthrogram with iodinated contrast injection into the right knee joint  CT of the knee is currently pending  Procedure was performed by NABOR Butterfield PA-C under the direct supervision of Dr Sandrita Michel   Workstation performed: [id number]

## 2022-07-13 NOTE — PATIENT INSTRUCTIONS
[address] [facility] - Outpatient Follow-up Note  Mayra Ayala 65 y o  female MRN: [id number]  Encounter: [id number]    ASSESSMENT AND PLAN:      1  Nausea  --Patient with ongoing intermittent nausea  Not always related to food intake  No new medications  Patient did have a lot of problems with nausea when she was hospitalized with vertigo in late December 2021  This is different  Not on any nonsteroidals  Does have some mild tenderness on palpation in the epigastric area  Does not have much in the way of pain  Because of ongoing symptoms will do EGD  - EGD; Future-[facility]    2  Family history of colonic polyps  - patient with some increased risk  Other family members but patient's daughter who was in her 45s has had advanced polyps  Patient's last colonoscopy was in 2016 which was negative  Patient does have a sense of not being able to fully evacuate although she does not have any issues with blood per rectum    - Colonoscopy; Future--[facility]    3  Encounter for current long-term use of anticoagulants  -- patient is 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  - patient with a family history of clotting diathesis  Patient had DVT and PE about 10 years ago  - has some protection with an IVC filter    - will plan to have  Xarelto on hold for 48 hours prior to procedure    4  Chronic pulmonary embolism, unspecified pulmonary embolism type, unspecified whether acute cor pulmonale present ([facility] [address] )  -- stable at this time  No problems with shortness of breath    5  LLQ abdominal pain    --- patient with ongoing problems with left-sided abdominal pain  It is intermittent not there all the time but has been persistent  It was felt to be related to a rectus sheath hematoma which was noted in the hospital in late December  Pain is really not away  Patient has had hysterectomy but her ovaries are in    Will obtain CT scan for   - CT abdomen pelvis wo contrast; Future      Followup Appointment: 3-4 mo

## 2022-07-13 NOTE — TELEPHONE ENCOUNTER
Scheduled date of colonoscopy/EGD (as of today):8/24/22  Physician performing colonoscopy:Dr Rafael Foreman  Location of colonoscopy:[facility]  Bowel prep reviewed with patient:Clenpiq  Instructions reviewed with patient by: Rosario Nunn  Clearances: Yes-Ana Rosa

## 2022-07-13 NOTE — PROGRESS NOTES
[address] [facility] - Outpatient Follow-up Note  Apple Martinez 65 y o  female MRN: [id number]  Encounter: [id number]    ASSESSMENT AND PLAN:      1  Nausea  --Patient with ongoing intermittent nausea  Not always related to food intake  No new medications  Patient did have a lot of problems with nausea when she was hospitalized with vertigo in late December 2021  This is different  Not on any nonsteroidals  Does have some mild tenderness on palpation in the epigastric area  Does not have much in the way of pain  Because of ongoing symptoms will do EGD  - EGD; Future-[facility]    2  Family history of colonic polyps  - patient with some increased risk  Other family members but patient's daughter who was in her 45s has had advanced polyps  Patient's last colonoscopy was in 2016 which was negative  Patient does have a sense of not being able to fully evacuate although she does not have any issues with blood per rectum    - Colonoscopy; Future-[facility]    3  Encounter for current long-term use of anticoagulants  -- patient is 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  - patient with a family history of clotting diathesis  Patient had DVT and PE about 10 years ago  - has some protection with an IVC filter     - will plan to have  Xarelto on hold for 48 hours prior to procedure    4  Chronic pulmonary embolism, unspecified pulmonary embolism type, unspecified whether acute cor pulmonale present ([facility] [address] )  -- stable at this time  No problems with shortness of breath    5  LLQ abdominal pain    --- patient with ongoing problems with left-sided abdominal pain  It is intermittent not there all the time but has been persistent  It was felt to be related to a rectus sheath hematoma which was noted in the hospital in late December  Pain is really not away  Patient has had hysterectomy but her ovaries are in    Will obtain CT scan for   - CT abdomen pelvis wo contrast; Future      Followup Appointment: 3-4 mo   ______________________________________________________________________    Chief Complaint   Patient presents with    Nausea, bloating     HPI:  Patient returns to the office for GI follow-up  She was seen in the office late last fall for pre colonoscopy visit as she was due for recall colonoscopy in late 2021  Unfortunately this was not carried out as patient had hospitalization for a rectus sheath hematoma related to her anticoagulation  Patient does have a history of DVT and PE diagnosed over 10 years ago  Patient went to reschedule the procedure around this time  In the antrum patient has had ongoing problems with nausea  Interestingly, patient had fairly severe nausea when she had vertigo around the time of her hospitalization in December  This sensation of nausea somewhat different however  Patient denies any dysphagia or odynophagia or significant heartburn at this time  This occurs multiple times a week  It is not necessarily related to eating  Patient has not had vomiting except for 1 occasion  She denies any major issues with upper abdominal pain  Patient has tried Zofran for nausea but this tends to make her constipated    She is on occasional topical nonsteroidal cream       ______________________________________________________________________      Historical Information   Past Medical History:   Diagnosis Date    History of DVT (deep vein thrombosis) 2013    History of pulmonary embolism 2013    Hypertension      Past Surgical History:   Procedure Laterality Date    BREAST MASS EXCISION      COLONOSCOPY      FL INJECTION RIGHT KNEE (ARTHROGRAM)  07/12/2022     Social History     Substance and Sexual Activity   Alcohol Use Yes    Comment: 1-2 x month     Social History     Substance and Sexual Activity   Drug Use Never     Social History     Tobacco Use   Smoking Status Never Smoker   Smokeless Tobacco Never Used     Family History   Problem Relation Age of Onset  Deep vein thrombosis Father     No Known Problems Sister     Breast cancer Brother     Colon cancer Neg Hx     Colon polyps Neg Hx          Current Outpatient Medications:     diazepam (VALIUM) 5 mg tablet    Diclofenac Sodium (VOLTAREN) 1 %    losartan (COZAAR) 25 mg tablet    meclizine (ANTIVERT) 25 mg tablet    ondansetron (Zofran ODT) 4 mg disintegrating tablet    terbinafine (LamISIL) 1 % cream    XARELTO 20 MG tablet    Sod Picosulfate-Mag Ox-Cit Acd (Clenpiq) 10-3 5-12 MG-GM -GM/160ML SOLN  Allergies   Allergen Reactions    Aspirin     Fluticasone      Reviewed medications and allergies and updated as indicated    Historical Information   Past Medical History:   Diagnosis Date    History of DVT (deep vein thrombosis) 2013    History of pulmonary embolism 2013    Hypertension      Past Surgical History:   Procedure Laterality Date    BREAST MASS EXCISION      FL INJECTION RIGHT KNEE (ARTHROGRAM)  7/12/2022     Social History     Substance and Sexual Activity   Alcohol Use Yes    Comment: 1-2 x month     Social History     Substance and Sexual Activity   Drug Use Never     Social History     Tobacco Use   Smoking Status Never Smoker   Smokeless Tobacco Never Used     Family History   Problem Relation Age of Onset    Deep vein thrombosis Father     No Known Problems Sister     Breast cancer Brother     Colon cancer Neg Hx     Colon polyps Neg Hx          Current Outpatient Medications:     diazepam (VALIUM) 5 mg tablet    Diclofenac Sodium (VOLTAREN) 1 %    losartan (COZAAR) 25 mg tablet    meclizine (ANTIVERT) 25 mg tablet    ondansetron (Zofran ODT) 4 mg disintegrating tablet    terbinafine (LamISIL) 1 % cream    XARELTO 20 MG tablet  Allergies   Allergen Reactions    Aspirin     Fluticasone      Reviewed medications and allergies and updated as indicated    PHYSICAL EXAM:    Blood pressure 128/82, pulse 70, height 5' 3" (1 6 m), weight 91 6 kg (202 lb)   Body mass index is 35 78 kg/m²  General Appearance: NAD, cooperative, alert  Eyes: Anicteric, conjunctiva pink   ENT:  Normocephalic, atraumatic, normal mucosa  Neck:  Supple, symmetrical, trachea midline  Resp:  Clear to auscultation bilaterally; no rales, rhonchi or wheezing; respirations unlabored   CV:  S1 S2, Regular rate and rhythm; no murmur, rub, or gallop  GI:  mild epigastric and left lower quadrant tenderness - no guarding or rebound , non-distended; normal bowel sounds; no masses, no organomegaly   Rectal: Deferred  Musculoskeletal: No cyanosis, clubbing or edema  Normal ROM    Skin:  No jaundice, rashes, or lesions   Heme/Lymph: No palpable cervical lymphadenopathy  Psych: Normal affect, good eye contact  Neuro: No gross deficits, AAOx3    Lab Results:   Lab Results   Component Value Date    WBC 5 93 12/30/2021    HGB 13 4 12/30/2021    HCT 42 4 12/30/2021    MCV 93 12/30/2021     12/30/2021     Lab Results   Component Value Date    K 4 1 12/30/2021     12/30/2021    CO2 26 12/30/2021    BUN 11 12/30/2021    CREATININE 0 76 12/30/2021    GLUF 93 12/28/2021    CALCIUM 8 3 12/30/2021    CORRECTEDCA 9 0 12/30/2021    AST 13 12/28/2021    ALT 39 12/28/2021    ALKPHOS 47 12/28/2021    EGFR 85 12/30/2021     No results found for: IRON, TIBC, FERRITIN  Lab Results   Component Value Date    LIPASE 88 12/27/2021

## 2022-07-13 NOTE — LETTER
July 14, 2022     Brent beth BeataDO Dm daniel  [address] [facility]    Patient: Sekou Keyes   YOB: 1960   Date of Visit: 7/13/2022       Dear Dr Osmin Huertas: Thank you for referring Cinda Lesch to me for evaluation  Below are my notes for this consultation  If you have questions, please do not hesitate to call me  I look forward to following your patient along with you  Sincerely,        Mary Rutherford MD        CC: No Recipients  Mary Rutherford MD  7/14/2022 12:19 PM  Sign when Signing Visit  [facility] - Outpatient Follow-up Note  Sekou Keyes 65 y o  female MRN: [id number]  Encounter: [id number]    ASSESSMENT AND PLAN:      1  Nausea  --Patient with ongoing intermittent nausea  Not always related to food intake  No new medications  Patient did have a lot of problems with nausea when she was hospitalized with vertigo in late December 2021  This is different  Not on any nonsteroidals  Does have some mild tenderness on palpation in the epigastric area  Does not have much in the way of pain  Because of ongoing symptoms will do EGD  - EGD; Future-[facility]    2  Family history of colonic polyps  - patient with some increased risk  Other family members but patient's daughter who was in her 45s has had advanced polyps  Patient's last colonoscopy was in 2016 which was negative  Patient does have a sense of not being able to fully evacuate although she does not have any issues with blood per rectum    - Colonoscopy; Future-[facility]    3  Encounter for current long-term use of anticoagulants  -- patient is 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  - patient with a family history of clotting diathesis  Patient had DVT and PE about 10 years ago  - has some protection with an IVC filter     - will plan to have  Xarelto on hold for 48 hours prior to procedure    4   Chronic pulmonary embolism, unspecified pulmonary embolism type, unspecified whether acute cor pulmonale present ([address] )  -- stable at this time  No problems with shortness of breath    5  LLQ abdominal pain    --- patient with ongoing problems with left-sided abdominal pain  It is intermittent not there all the time but has been persistent  It was felt to be related to a rectus sheath hematoma which was noted in the hospital in late December  Pain is really not away  Patient has had hysterectomy but her ovaries are in  Will obtain CT scan for   - CT abdomen pelvis wo contrast; Future      Followup Appointment: 3-4 mo   ______________________________________________________________________    Chief Complaint   Patient presents with    Nausea, bloating     HPI:  Patient returns to the office for GI follow-up  She was seen in the office late last fall for pre colonoscopy visit as she was due for recall colonoscopy in late 2021  Unfortunately this was not carried out as patient had hospitalization for a rectus sheath hematoma related to her anticoagulation  Patient does have a history of DVT and PE diagnosed over 10 years ago  Patient went to reschedule the procedure around this time  In the antrum patient has had ongoing problems with nausea  Interestingly, patient had fairly severe nausea when she had vertigo around the time of her hospitalization in December  This sensation of nausea somewhat different however  Patient denies any dysphagia or odynophagia or significant heartburn at this time  This occurs multiple times a week  It is not necessarily related to eating  Patient has not had vomiting except for 1 occasion  She denies any major issues with upper abdominal pain  Patient has tried Zofran for nausea but this tends to make her constipated    She is on occasional topical nonsteroidal cream       ______________________________________________________________________      Historical Information   Past Medical History:   Diagnosis Date    History of DVT (deep vein thrombosis) 2013    History of pulmonary embolism 2013    Hypertension      Past Surgical History:   Procedure Laterality Date    BREAST MASS EXCISION      COLONOSCOPY      FL INJECTION RIGHT KNEE (ARTHROGRAM)  07/12/2022     Social History     Substance and Sexual Activity   Alcohol Use Yes    Comment: 1-2 x month     Social History     Substance and Sexual Activity   Drug Use Never     Social History     Tobacco Use   Smoking Status Never Smoker   Smokeless Tobacco Never Used     Family History   Problem Relation Age of Onset    Deep vein thrombosis Father     No Known Problems Sister     Breast cancer Brother     Colon cancer Neg Hx     Colon polyps Neg Hx          Current Outpatient Medications:     diazepam (VALIUM) 5 mg tablet    Diclofenac Sodium (VOLTAREN) 1 %    losartan (COZAAR) 25 mg tablet    meclizine (ANTIVERT) 25 mg tablet    ondansetron (Zofran ODT) 4 mg disintegrating tablet    terbinafine (LamISIL) 1 % cream    XARELTO 20 MG tablet    Sod Picosulfate-Mag Ox-Cit Acd (Clenpiq) 10-3 5-12 MG-GM -GM/160ML SOLN  Allergies   Allergen Reactions    Aspirin     Fluticasone      Reviewed medications and allergies and updated as indicated    Historical Information   Past Medical History:   Diagnosis Date    History of DVT (deep vein thrombosis) 2013    History of pulmonary embolism 2013    Hypertension      Past Surgical History:   Procedure Laterality Date    BREAST MASS EXCISION      FL INJECTION RIGHT KNEE (ARTHROGRAM)  7/12/2022     Social History     Substance and Sexual Activity   Alcohol Use Yes    Comment: 1-2 x month     Social History     Substance and Sexual Activity   Drug Use Never     Social History     Tobacco Use   Smoking Status Never Smoker   Smokeless Tobacco Never Used     Family History   Problem Relation Age of Onset    Deep vein thrombosis Father     No Known Problems Sister     Breast cancer Brother     Colon cancer Neg Hx     Colon polyps Neg Hx          Current Outpatient Medications:     diazepam (VALIUM) 5 mg tablet    Diclofenac Sodium (VOLTAREN) 1 %    losartan (COZAAR) 25 mg tablet    meclizine (ANTIVERT) 25 mg tablet    ondansetron (Zofran ODT) 4 mg disintegrating tablet    terbinafine (LamISIL) 1 % cream    XARELTO 20 MG tablet  Allergies   Allergen Reactions    Aspirin     Fluticasone      Reviewed medications and allergies and updated as indicated    PHYSICAL EXAM:    Blood pressure 128/82, pulse 70, height 5' 3" (1 6 m), weight 91 6 kg (202 lb)  Body mass index is 35 78 kg/m²  General Appearance: NAD, cooperative, alert  Eyes: Anicteric, conjunctiva pink   ENT:  Normocephalic, atraumatic, normal mucosa  Neck:  Supple, symmetrical, trachea midline  Resp:  Clear to auscultation bilaterally; no rales, rhonchi or wheezing; respirations unlabored   CV:  S1 S2, Regular rate and rhythm; no murmur, rub, or gallop  GI:  mild epigastric and left lower quadrant tenderness - no guarding or rebound , non-distended; normal bowel sounds; no masses, no organomegaly   Rectal: Deferred  Musculoskeletal: No cyanosis, clubbing or edema  Normal ROM    Skin:  No jaundice, rashes, or lesions   Heme/Lymph: No palpable cervical lymphadenopathy  Psych: Normal affect, good eye contact  Neuro: No gross deficits, AAOx3    Lab Results:   Lab Results   Component Value Date    WBC 5 93 12/30/2021    HGB 13 4 12/30/2021    HCT 42 4 12/30/2021    MCV 93 12/30/2021     12/30/2021     Lab Results   Component Value Date    K 4 1 12/30/2021     12/30/2021    CO2 26 12/30/2021    BUN 11 12/30/2021    CREATININE 0 76 12/30/2021    GLUF 93 12/28/2021    CALCIUM 8 3 12/30/2021    CORRECTEDCA 9 0 12/30/2021    AST 13 12/28/2021    ALT 39 12/28/2021    ALKPHOS 47 12/28/2021    EGFR 85 12/30/2021     No results found for: IRON, TIBC, FERRITIN  Lab Results   Component Value Date    LIPASE 88 12/27/2021

## 2022-07-15 ENCOUNTER — OFFICE VISIT (OUTPATIENT)
Dept: OBGYN CLINIC | Facility: CLINIC | Age: 62
End: 2022-07-15
Payer: COMMERCIAL

## 2022-07-15 ENCOUNTER — TELEPHONE (OUTPATIENT)
Dept: GASTROENTEROLOGY | Facility: CLINIC | Age: 62
End: 2022-07-15

## 2022-07-15 ENCOUNTER — PREP FOR PROCEDURE (OUTPATIENT)
Dept: OBGYN CLINIC | Facility: CLINIC | Age: 62
End: 2022-07-15

## 2022-07-15 VITALS
WEIGHT: 202 LBS | DIASTOLIC BLOOD PRESSURE: 88 MMHG | SYSTOLIC BLOOD PRESSURE: 120 MMHG | HEIGHT: 63 IN | BODY MASS INDEX: 35.79 KG/M2

## 2022-07-15 DIAGNOSIS — Z01.818 PRE-OP TESTING: ICD-10-CM

## 2022-07-15 DIAGNOSIS — M17.11 PRIMARY OSTEOARTHRITIS OF RIGHT KNEE: ICD-10-CM

## 2022-07-15 DIAGNOSIS — S83.241A OTHER TEAR OF MEDIAL MENISCUS, CURRENT INJURY, RIGHT KNEE, INITIAL ENCOUNTER: Primary | ICD-10-CM

## 2022-07-15 PROCEDURE — 99214 OFFICE O/P EST MOD 30 MIN: CPT | Performed by: ORTHOPAEDIC SURGERY

## 2022-07-15 RX ORDER — TRAMADOL HYDROCHLORIDE 50 MG/1
50 TABLET ORAL EVERY 6 HOURS PRN
Status: CANCELLED | OUTPATIENT
Start: 2022-07-15

## 2022-07-15 RX ORDER — CEFAZOLIN SODIUM 2 G/50ML
2000 SOLUTION INTRAVENOUS ONCE
Status: CANCELLED | OUTPATIENT
Start: 2022-08-17 | End: 2022-07-15

## 2022-07-15 RX ORDER — ACETAMINOPHEN 325 MG/1
650 TABLET ORAL EVERY 6 HOURS PRN
Status: CANCELLED | OUTPATIENT
Start: 2022-07-15

## 2022-07-15 NOTE — TELEPHONE ENCOUNTER
Patients GI provider:  Dr Ko Villalobos    Number to return call: [phone]    Reason for call: Pt calling asking if she is able to have her procedure rescheduled to a sooner date before 8/17/2022 due to having orthopedic surgery planned as well  Please reach out to pt regarding this matter      Scheduled procedure/appointment date if applicable:  Procedure: 8/24/2022

## 2022-07-15 NOTE — TELEPHONE ENCOUNTER
Clearance form re-sent with new date of procedure  Will follow up with it next week if not received

## 2022-07-15 NOTE — PROGRESS NOTES
Ortho Sports Medicine Knee Follow Up Visit     Assesment:   65 y o  female right knee moderate medial and patellofemoral joint osteoarthritis with medial meniscus tear    Plan:    Conservative treatment:    Ice to knee for 20 minutes at least 1-2 times daily  Postop PT written  Crutches given    Imaging: All imaging from today was reviewed by myself and explained to the patient  Injection:    No Injection planned at this time  Surgery: All of the risks and benefits of operative treatment were explained to the patient, as well as the risks and benefits of any alternative treatment options, including nonoperative care  The risks of surgical treatement include, but are not limited to, infection, bleeding, blood clot, neurovascular damage, need for further surgery, continued pain, cardiovascular risk, and anesthesia risk  The patient understood this and elects to proceed forward with surgical intervention  We will proceed forward with surgical arthroscopy of the knee with meniscectomy of the medial possible lateral meniscus    Patient does have a history of DVT and PEs  Patient is on Xarelto for this  Patient's family doctor monitors her Xarelto and blood clotting conditions  We will reach out to the patient's family doctor for medical clearance  We will also ask when they felt appropriate for the patient to discontinue Xarelto prior to surgery to decrease her risk of bleeding at the time of surgery  Patient denies having any allergies to antibiotics  She denies any history of MRSA  She denies any cardiac conditions  Follow up:    Return for 1 week post-op  Chief Complaint   Patient presents with    Right Knee - Follow-up     History of Present Illness: The patient is returns for follow up of CT arthrogram follow-up of right knee  Since the prior visit, She reports no improvement  Patient continues to have significant pain about the medial aspect of the knee    Patient is unable to perform any twisting or pivoting type motions without getting significant pain  Patient describes his pain as sharp and stabbing  Pain is located anterior, lateral, medial      Pain is improved by rest, ice, NSAIDS, physical therapy and injection  Pain is aggravated by stairs, squatting, weight bearing, walking, sitting, standing and pivoting on a planted foot  Symptoms include clicking, catching and popping  The patient has tried rest, ice, NSAIDS, physical therapy and injection  Knee Surgical History:  None    Past Medical, Social and Family History:  Past Medical History:   Diagnosis Date    History of DVT (deep vein thrombosis) 2013    History of pulmonary embolism 2013    Hypertension      Past Surgical History:   Procedure Laterality Date    BREAST MASS EXCISION      COLONOSCOPY      FL INJECTION RIGHT KNEE (ARTHROGRAM)  07/12/2022     Allergies   Allergen Reactions    Aspirin     Fluticasone      Current Outpatient Medications on File Prior to Visit   Medication Sig Dispense Refill    diazepam (VALIUM) 5 mg tablet Take 1 tablet (5 mg total) by mouth once in imaging for anxiety for up to 1 dose 1 tablet 0    Diclofenac Sodium (VOLTAREN) 1 % Apply 2 g topically 4 (four) times a day 150 g 0    losartan (COZAAR) 25 mg tablet       meclizine (ANTIVERT) 25 mg tablet Take 1 tablet (25 mg total) by mouth every 8 (eight) hours as needed for dizziness 30 tablet 0    ondansetron (Zofran ODT) 4 mg disintegrating tablet Take 1 tablet (4 mg total) by mouth every 6 (six) hours as needed for nausea or vomiting (Vertigo symptoms) 20 tablet 0    Sod Picosulfate-Mag Ox-Cit Acd (Clenpiq) 10-3 5-12 MG-GM -GM/160ML SOLN As directed (1 kit) 320 mL 0    terbinafine (LamISIL) 1 % cream APPLY TOPICALLY TWICE A DAY AS NEEDED FOR RASH; USE ON ARMS FOR ONE WEEK      XARELTO 20 MG tablet        No current facility-administered medications on file prior to visit       Social History     Socioeconomic History    Marital status: Single     Spouse name: Not on file    Number of children: Not on file    Years of education: Not on file    Highest education level: Not on file   Occupational History    Not on file   Tobacco Use    Smoking status: Never Smoker    Smokeless tobacco: Never Used   Vaping Use    Vaping Use: Never used   Substance and Sexual Activity    Alcohol use: Yes     Comment: 1-2 x month    Drug use: Never    Sexual activity: Not Currently     Partners: Male     Birth control/protection: Post-menopausal, Female Sterilization   Other Topics Concern    Not on file   Social History Narrative    Not on file     Social Determinants of Health     Financial Resource Strain: Not on file   Food Insecurity: No Food Insecurity    Worried About Running Out of Food in the Last Year: Never true    [address] in the Last Year: Never true   Transportation Needs: No Transportation Needs    Lack of Transportation (Medical): No    Lack of Transportation (Non-Medical): No   Physical Activity: Not on file   Stress: Not on file   Social Connections: Not on file   Intimate Partner Violence: Not on file   Housing Stability: Low Risk     Unable to Pay for Housing in the Last Year: No    Number of Places Lived in the Last Year: 1    Unstable Housing in the Last Year: No         I have reviewed the past medical, surgical, social and family history, medications and allergies as documented in the EMR  Review of systems: ROS is negative other than that noted in the HPI  Constitutional: Negative for fatigue and fever  Physical Exam:    Blood pressure 120/88, height 5' 3" (1 6 m), weight 91 6 kg (202 lb)      General/Constitutional: NAD, well developed, well nourished  HENT: Normocephalic, atraumatic  CV: Intact distal pulses, regular rate  Resp: No respiratory distress or labored breathing  Lymphatic: No lymphadenopathy palpated  Neuro: Alert and Oriented x 3, no focal deficits  Psych: Normal mood, normal affect, normal judgement, normal behavior  Skin: Warm, dry, no rashes, no erythema      Knee Exam (focused): RIGHT LEFT   ROM:   0-130 0-130   Palpation: Effusion negative negative     MJL tenderness Positive Negative     LJL tenderness Positive Negative   Meniscus: Jc Positive Negative    Apley's Compression Positive Negative   Instability: Varus stable stable     Valgus stable stable   Special Tests: Lachman Negative Negative     Posterior drawer Negative Negative     Anterior drawer Negative Negative     Pivot shift not tested not tested     Dial not tested not tested   Patella: Palpation no tenderness no tenderness     Mobility 1/4 1/4     Apprehension Negative Negative   Other: Single leg 1/4 squat not tested not tested      LE NV Exam: +2 DP/PT pulses bilaterally  Sensation intact to light touch L2-S1 bilaterally    No calf tenderness to palpation bilaterally      Knee Imaging    CT arthrogram of the right knee demonstrates grade 3 patella and grade 4 medial compartment chondrosis  There was possible tear of the medial meniscus  I reviewed the radiology report agree with impression

## 2022-07-18 NOTE — TELEPHONE ENCOUNTER
DELM for pt  OK to hold xarelto prior to procedures  Last dose is 7/26/22  Asked her to call to confirm she received the message

## 2022-07-25 ENCOUNTER — TELEPHONE (OUTPATIENT)
Dept: GASTROENTEROLOGY | Facility: CLINIC | Age: 62
End: 2022-07-25

## 2022-07-25 DIAGNOSIS — R10.32 LLQ ABDOMINAL PAIN: Primary | ICD-10-CM

## 2022-07-25 DIAGNOSIS — R11.0 NAUSEA: ICD-10-CM

## 2022-07-25 NOTE — TELEPHONE ENCOUNTER
You ordered CT scan on patient which she scheduled for 7/28/22  Her insurance plan will not approve, requires ultrasound result prior and completion of labs which patient has not completed to date  Please advise

## 2022-07-28 ENCOUNTER — TELEPHONE (OUTPATIENT)
Dept: GASTROENTEROLOGY | Facility: CLINIC | Age: 62
End: 2022-07-28

## 2022-07-28 NOTE — TELEPHONE ENCOUNTER
US abdomen order placed  I called pt, no answer, left message  to schedule   Per referral notes patient needs to complete ultrasound or labs prior to [facility] approval

## 2022-08-12 ENCOUNTER — TELEPHONE (OUTPATIENT)
Dept: OBGYN CLINIC | Facility: CLINIC | Age: 62
End: 2022-08-12

## 2022-08-22 ENCOUNTER — OFFICE VISIT (OUTPATIENT)
Dept: OBGYN CLINIC | Facility: CLINIC | Age: 62
End: 2022-08-22
Payer: COMMERCIAL

## 2022-08-22 VITALS
BODY MASS INDEX: 35.44 KG/M2 | HEIGHT: 63 IN | WEIGHT: 200 LBS | SYSTOLIC BLOOD PRESSURE: 120 MMHG | DIASTOLIC BLOOD PRESSURE: 80 MMHG

## 2022-08-22 DIAGNOSIS — M23.231 DERANG OF MEDIAL MENISCUS DUE TO OLD TEAR/INJ, RIGHT KNEE: ICD-10-CM

## 2022-08-22 DIAGNOSIS — M17.11 PRIMARY OSTEOARTHRITIS OF RIGHT KNEE: Primary | ICD-10-CM

## 2022-08-22 PROCEDURE — 20610 DRAIN/INJ JOINT/BURSA W/O US: CPT | Performed by: FAMILY MEDICINE

## 2022-08-22 PROCEDURE — 99214 OFFICE O/P EST MOD 30 MIN: CPT | Performed by: FAMILY MEDICINE

## 2022-08-22 RX ORDER — LIDOCAINE HYDROCHLORIDE 10 MG/ML
4 INJECTION, SOLUTION INFILTRATION; PERINEURAL
Status: COMPLETED | OUTPATIENT
Start: 2022-08-22 | End: 2022-08-22

## 2022-08-22 RX ORDER — TRIAMCINOLONE ACETONIDE 40 MG/ML
40 INJECTION, SUSPENSION INTRA-ARTICULAR; INTRAMUSCULAR
Status: COMPLETED | OUTPATIENT
Start: 2022-08-22 | End: 2022-08-22

## 2022-08-22 RX ADMIN — LIDOCAINE HYDROCHLORIDE 4 ML: 10 INJECTION, SOLUTION INFILTRATION; PERINEURAL at 11:53

## 2022-08-22 RX ADMIN — TRIAMCINOLONE ACETONIDE 40 MG: 40 INJECTION, SUSPENSION INTRA-ARTICULAR; INTRAMUSCULAR at 11:53

## 2022-08-22 NOTE — PROGRESS NOTES
1  Primary osteoarthritis of right knee     2  Derang of medial meniscus due to old tear/inj, right knee       No orders of the defined types were placed in this encounter  IMAGING STUDIES: (I personally reviewed images in PACS and report):         PAST REPORTS:  CT arthrogram right knee 07/12/2022:   1  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  with more than 50% of cartilage loss  2  Possible tear at the meniscus posterior horn          ASSESSMENT/PLAN:  Right knee moderate medial osteoarthritis with more than 50% of cartilage loss medial femoral condyle  Undersurface fraying is noted of the medial meniscus posterior horn series 401/33, may represent a focal tear    Repeat X-ray next visit: None    Return for Follow-up with Surgeon  Patient Instructions   Patient would like to delay her surgical intervention  corticosteroid injection today     red flags and risks of injection include but are not limited to infection <0 072% as referenced in some sources, nerve or artery penetration, and if steroids are used-skin dimpling <1%, hypo-pigmentation <1%  Recommended no submerging underwater in a tub, pool, ocean, lake, jacuzzi, hot tub, or any other body of water for 1 week until needle wound closes due to risk of infection  May take showers  Clean needle site with soap and water and keep covered at all times with sterile bandage such as a band-aid until fully healed  Educated if any symptoms including fevers, chills, swelling, or worsening symptoms occur then to call office or go to hospital for immediate care if physician unavailable due to possible infection or other complication which is a serious medical problem  Patient expressed understanding and agreed to proceed with procedure             __________________________________________________________________________    HISTORY OF PRESENT ILLNESS:  Follow-up right knee pain  Was seen by orthopedic surgeon    She has a history of IVC filter with no MRI  She did have CT arthrogram performed revealing meniscal tear    Right knee pain: Controlled      Review of Systems      Following history reviewed and update:    Past Medical History:   Diagnosis Date    History of DVT (deep vein thrombosis) 2013    History of pulmonary embolism 2013    Hypertension      Past Surgical History:   Procedure Laterality Date    BREAST MASS EXCISION      COLONOSCOPY      FL INJECTION RIGHT KNEE (ARTHROGRAM)  07/12/2022     Social History   Social History     Substance and Sexual Activity   Alcohol Use Yes    Comment: 1-2 x month     Social History     Substance and Sexual Activity   Drug Use Never     Social History     Tobacco Use   Smoking Status Never Smoker   Smokeless Tobacco Never Used     Family History   Problem Relation Age of Onset    Deep vein thrombosis Father     No Known Problems Sister     Breast cancer Brother     Colon cancer Neg Hx     Colon polyps Neg Hx      Allergies   Allergen Reactions    Aspirin     Fluticasone           Physical Exam  /80   Ht 5' 3" (1 6 m)   Wt 90 7 kg (200 lb)   BMI 35 43 kg/m²     Constitutional:  see vital signs  Gen: well-developed, normocephalic/atraumatic, well-groomed  Eyes: No inflammation or discharge of conjunctiva or lids; sclera clear   Pharynx: no inflammation, lesion, or mass of lips  Neck: supple, no masses, non-distended  MSK: no inflammation, lesion, mass, or clubbing of nails and digits except for other than mentioned below  SKIN: no visible rashes or skin lesions  Pulmonary/Chest: Effort normal  No respiratory distress     NEURO: cranial nerves grossly intact  PSYCH:  Alert and oriented to person, place, and time; recent and remote memory intact; mood normal, no depression, anxiety, or agitation, judgment and insight good and intact     Ortho Exam  RIGHT KNEE:  Erythema: no  Swelling: no  Increased Warmth: no  Tenderness: none  Flexion: intact  Extension: intact  Patellar Displacement:  Patellar Tilt:  Patellar Apprehension: negative  Patellar Grind Arce's: negative  Lachman's: negative  Drawer: negative  Varus laxity: negative  Valgus laxity: negative  Ayla: negative   Thessaly Test:  Dial Test:       __________________________________________________________________________  Large joint arthrocentesis: R knee  Universal Protocol:  Consent: Verbal consent obtained  Risks and benefits: risks, benefits and alternatives were discussed  Consent given by: patient  Time out: Immediately prior to procedure a "time out" was called to verify the correct patient, procedure, equipment, support staff and site/side marked as required    Patient understanding: patient states understanding of the procedure being performed  Site marked: the operative site was marked  Patient identity confirmed: verbally with patient    Supporting Documentation  Indications: pain and diagnostic evaluation   Procedure Details  Location: knee - R knee  Preparation: Patient was prepped and draped in the usual sterile fashion  Needle size: 22 G  Ultrasound guidance: no  Approach: anterolateral  Medications administered: 4 mL lidocaine 1 %; 40 mg triamcinolone acetonide 40 mg/mL    Patient tolerance: patient tolerated the procedure well with no immediate complications  Dressing:  Sterile dressing applied

## 2022-08-22 NOTE — PATIENT INSTRUCTIONS
Patient would like to delay her surgical intervention  corticosteroid injection today     red flags and risks of injection include but are not limited to infection <0 072% as referenced in some sources, nerve or artery penetration, and if steroids are used-skin dimpling <1%, hypo-pigmentation <1%  Recommended no submerging underwater in a tub, pool, ocean, lake, jacuzzi, hot tub, or any other body of water for 1 week until needle wound closes due to risk of infection  May take showers  Clean needle site with soap and water and keep covered at all times with sterile bandage such as a band-aid until fully healed  Educated if any symptoms including fevers, chills, swelling, or worsening symptoms occur then to call office or go to hospital for immediate care if physician unavailable due to possible infection or other complication which is a serious medical problem  Patient expressed understanding and agreed to proceed with procedure

## 2022-10-12 PROBLEM — J01.00 ACUTE NON-RECURRENT MAXILLARY SINUSITIS: Status: RESOLVED | Noted: 2021-12-28 | Resolved: 2022-10-12

## 2022-10-13 ENCOUNTER — TELEPHONE (OUTPATIENT)
Dept: GASTROENTEROLOGY | Facility: CLINIC | Age: 62
End: 2022-10-13

## 2022-10-13 NOTE — TELEPHONE ENCOUNTER
LMTCB to review clenpiq prep instructions  Pt should have clenpiq already  Also reminded pt to hold blood thinner on 10/23 and 10/24/2022    Combo is scheduled for 10/24/2022 at [facility]

## 2022-10-21 ENCOUNTER — TELEPHONE (OUTPATIENT)
Dept: GASTROENTEROLOGY | Facility: CLINIC | Age: 62
End: 2022-10-21

## 2022-10-25 ENCOUNTER — ANESTHESIA EVENT (OUTPATIENT)
Dept: GASTROENTEROLOGY | Facility: AMBULATORY SURGERY CENTER | Age: 62
End: 2022-10-25

## 2022-10-25 ENCOUNTER — ANESTHESIA (OUTPATIENT)
Dept: GASTROENTEROLOGY | Facility: AMBULATORY SURGERY CENTER | Age: 62
End: 2022-10-25

## 2022-10-25 ENCOUNTER — HOSPITAL ENCOUNTER (OUTPATIENT)
Dept: GASTROENTEROLOGY | Facility: AMBULATORY SURGERY CENTER | Age: 62
Discharge: HOME | End: 2022-10-25

## 2022-10-25 VITALS
HEIGHT: 63 IN | WEIGHT: 190 LBS | RESPIRATION RATE: 66 BRPM | OXYGEN SATURATION: 100 % | SYSTOLIC BLOOD PRESSURE: 128 MMHG | TEMPERATURE: 97.5 F | HEART RATE: 66 BPM | DIASTOLIC BLOOD PRESSURE: 70 MMHG | BODY MASS INDEX: 33.66 KG/M2

## 2022-10-25 DIAGNOSIS — R11.0 NAUSEA: ICD-10-CM

## 2022-10-25 DIAGNOSIS — Z83.71 FAMILY HISTORY OF COLONIC POLYPS: ICD-10-CM

## 2022-10-25 DIAGNOSIS — K20.90 ESOPHAGITIS: Primary | ICD-10-CM

## 2022-10-25 RX ORDER — LIDOCAINE HYDROCHLORIDE 20 MG/ML
INJECTION, SOLUTION EPIDURAL; INFILTRATION; INTRACAUDAL; PERINEURAL AS NEEDED
Status: DISCONTINUED | OUTPATIENT
Start: 2022-10-25 | End: 2022-10-25

## 2022-10-25 RX ORDER — SODIUM CHLORIDE 9 MG/ML
50 INJECTION, SOLUTION INTRAVENOUS CONTINUOUS
Status: DISCONTINUED | OUTPATIENT
Start: 2022-10-25 | End: 2022-10-29 | Stop reason: HOSPADM

## 2022-10-25 RX ORDER — PROPOFOL 10 MG/ML
INJECTION, EMULSION INTRAVENOUS AS NEEDED
Status: DISCONTINUED | OUTPATIENT
Start: 2022-10-25 | End: 2022-10-25

## 2022-10-25 RX ORDER — OMEPRAZOLE 40 MG/1
40 CAPSULE, DELAYED RELEASE ORAL DAILY
Qty: 30 CAPSULE | Refills: 1 | Status: SHIPPED | OUTPATIENT
Start: 2022-10-25 | End: 2022-12-24

## 2022-10-25 RX ADMIN — PROPOFOL 50 MG: 10 INJECTION, EMULSION INTRAVENOUS at 10:15

## 2022-10-25 RX ADMIN — SODIUM CHLORIDE: 9 INJECTION, SOLUTION INTRAVENOUS at 09:33

## 2022-10-25 RX ADMIN — SODIUM CHLORIDE 50 ML/HR: 9 INJECTION, SOLUTION INTRAVENOUS at 09:37

## 2022-10-25 RX ADMIN — PROPOFOL 50 MG: 10 INJECTION, EMULSION INTRAVENOUS at 09:48

## 2022-10-25 RX ADMIN — PROPOFOL 50 MG: 10 INJECTION, EMULSION INTRAVENOUS at 09:55

## 2022-10-25 RX ADMIN — PROPOFOL 100 MG: 10 INJECTION, EMULSION INTRAVENOUS at 09:46

## 2022-10-25 RX ADMIN — PROPOFOL 50 MG: 10 INJECTION, EMULSION INTRAVENOUS at 10:10

## 2022-10-25 RX ADMIN — PROPOFOL 50 MG: 10 INJECTION, EMULSION INTRAVENOUS at 10:01

## 2022-10-25 RX ADMIN — PROPOFOL 50 MG: 10 INJECTION, EMULSION INTRAVENOUS at 10:05

## 2022-10-25 RX ADMIN — PROPOFOL 50 MG: 10 INJECTION, EMULSION INTRAVENOUS at 09:50

## 2022-10-25 RX ADMIN — LIDOCAINE HYDROCHLORIDE 100 MG: 20 INJECTION, SOLUTION EPIDURAL; INFILTRATION; INTRACAUDAL; PERINEURAL at 09:44

## 2022-10-25 RX ADMIN — PROPOFOL 200 MG: 10 INJECTION, EMULSION INTRAVENOUS at 09:44

## 2022-10-25 NOTE — ANESTHESIA PREPROCEDURE EVALUATION
Procedure:  COLONOSCOPY  EGD    Relevant Problems   CARDIO   (+) Atypical chest pain   (+) DVT of leg (deep venous thrombosis) (HCC)   (+) Hypertension   (+) Pulmonary embolism (HCC)      MUSCULOSKELETAL   (+) Arthritis    Pt has an IVC filter    Physical Exam    Airway    Mallampati score: II  TM Distance: >3 FB  Neck ROM: full     Dental   No notable dental hx     Cardiovascular      Pulmonary      Other Findings        Anesthesia Plan  ASA Score- 3     Anesthesia Type- IV sedation with anesthesia with ASA Monitors  Additional Monitors:   Airway Plan:           Plan Factors-Exercise tolerance (METS): >4 METS  Chart reviewed  Patient summary reviewed  Induction- intravenous  Postoperative Plan-     Informed Consent- Anesthetic plan and risks discussed with patient  I personally reviewed this patient with the CRNA  Discussed and agreed on the Anesthesia Plan with the CRNA  Gaviota Padilla

## 2022-10-25 NOTE — ANESTHESIA POSTPROCEDURE EVALUATION
Post-Op Assessment Note    CV Status:  Stable  Pain Score: 0    Pain management: adequate     Mental Status:  Arousable and sleepy   Hydration Status:  Stable   PONV Controlled:  Controlled   Airway Patency:  Patent      Post Op Vitals Reviewed: Yes      Staff: CRNA         No complications documented      BP   131/81   Temp 97 6   Pulse 68   Resp 14   SpO2 99

## 2022-10-25 NOTE — H&P
History and Physical - [facility]  Lorna Weeks 65 y o  female MRN: [id number]    HPI: Lorna Weeks is a 65y o  year old female who presents for EGD and colonoscopy for evaluation of nausea and family history of colon polyps  Patient is 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  Last colonoscopy was in 2016 which was negative  REVIEW OF SYSTEMS: Per the HPI, and otherwise unremarkable      Historical Information   Past Medical History:   Diagnosis Date   • History of DVT (deep vein thrombosis) 2013   • History of pulmonary embolism 2013   • Hypertension      Past Surgical History:   Procedure Laterality Date   • BREAST MASS EXCISION     • COLONOSCOPY     • FL INJECTION RIGHT KNEE (ARTHROGRAM)  07/12/2022   • IVC FILTER INSERTION       Social History   Social History     Substance and Sexual Activity   Alcohol Use Yes    Comment: 1-2 x month     Social History     Substance and Sexual Activity   Drug Use Never     Social History     Tobacco Use   Smoking Status Never Smoker   Smokeless Tobacco Never Used     Family History   Problem Relation Age of Onset   • Deep vein thrombosis Father    • No Known Problems Sister    • Breast cancer Brother    • Colon polyps Daughter    • Colon cancer Neg Hx        Meds/Allergies       Current Outpatient Medications:   •  Diclofenac Sodium (VOLTAREN) 1 %  •  losartan (COZAAR) 25 mg tablet  •  Sod Picosulfate-Mag Ox-Cit Acd (Clenpiq) 10-3 5-12 MG-GM -GM/160ML SOLN  •  XARELTO 20 MG tablet  •  diazepam (VALIUM) 5 mg tablet  •  meclizine (ANTIVERT) 25 mg tablet  •  ondansetron (Zofran ODT) 4 mg disintegrating tablet  •  terbinafine (LamISIL) 1 % cream    Current Facility-Administered Medications:   •  sodium chloride 0 9 % infusion, 50 mL/hr, Intravenous, Continuous, 50 mL/hr at 10/25/22 0937    Allergies   Allergen Reactions   • Aspirin Hives   • Fluticasone Hives       Objective     /77   Pulse 73   Temp 97 5 °F (36 4 °C) (Temporal)   Resp 17 Ht 5' 3" (1 6 m)   Wt 86 2 kg (190 lb)   SpO2 96%   BMI 33 66 kg/m²     PHYSICAL EXAM    Gen: NAD AAOx3  Head: Normocephalic, Atraumatic  CV: S1S2 RRR no m/r/g  CHEST: Clear b/l no c/r/w  ABD: soft, +BS NT/ND  EXT: no edema    ASSESSMENT/PLAN:  This is a 65y o  year old female here for EGD and colonoscopy, and she is stable and optimized for her procedure

## 2022-12-09 ENCOUNTER — TELEPHONE (OUTPATIENT)
Dept: GASTROENTEROLOGY | Facility: CLINIC | Age: 62
End: 2022-12-09

## 2023-03-03 DIAGNOSIS — M70.61 TROCHANTERIC BURSITIS OF RIGHT HIP: ICD-10-CM

## 2023-03-03 DIAGNOSIS — M22.2X1 PATELLOFEMORAL PAIN SYNDROME OF RIGHT KNEE: ICD-10-CM

## 2024-09-27 DIAGNOSIS — Z00.6 ENCOUNTER FOR EXAMINATION FOR NORMAL COMPARISON OR CONTROL IN CLINICAL RESEARCH PROGRAM: ICD-10-CM

## 2024-12-11 ENCOUNTER — HOSPITAL ENCOUNTER (EMERGENCY)
Facility: HOSPITAL | Age: 64
Discharge: HOME | End: 2024-12-11
Attending: EMERGENCY MEDICINE
Payer: COMMERCIAL

## 2024-12-11 ENCOUNTER — APPOINTMENT (EMERGENCY)
Dept: CT IMAGING | Facility: HOSPITAL | Age: 64
End: 2024-12-11
Payer: COMMERCIAL

## 2024-12-11 ENCOUNTER — APPOINTMENT (OUTPATIENT)
Dept: RADIOLOGY | Facility: HOSPITAL | Age: 64
End: 2024-12-11
Payer: COMMERCIAL

## 2024-12-11 VITALS
RESPIRATION RATE: 16 BRPM | SYSTOLIC BLOOD PRESSURE: 144 MMHG | WEIGHT: 186 LBS | HEART RATE: 69 BPM | BODY MASS INDEX: 32.96 KG/M2 | DIASTOLIC BLOOD PRESSURE: 81 MMHG | HEIGHT: 63 IN | TEMPERATURE: 98.5 F | OXYGEN SATURATION: 99 %

## 2024-12-11 DIAGNOSIS — R51.9 NONINTRACTABLE HEADACHE, UNSPECIFIED CHRONICITY PATTERN, UNSPECIFIED HEADACHE TYPE: Primary | ICD-10-CM

## 2024-12-11 LAB
ALBUMIN SERPL BCG-MCNC: 4.5 G/DL (ref 3.5–5)
ALP SERPL-CCNC: 55 U/L (ref 34–104)
ALT SERPL W P-5'-P-CCNC: 22 U/L (ref 7–52)
ANION GAP SERPL CALCULATED.3IONS-SCNC: 10 MMOL/L (ref 4–13)
AST SERPL W P-5'-P-CCNC: 20 U/L (ref 13–39)
BASOPHILS # BLD MANUAL: 0 THOUSAND/UL (ref 0–0.1)
BASOPHILS NFR MAR MANUAL: 0 % (ref 0–1)
BILIRUB SERPL-MCNC: 0.63 MG/DL (ref 0.2–1)
BUN SERPL-MCNC: 14 MG/DL (ref 5–25)
CALCIUM SERPL-MCNC: 9.1 MG/DL (ref 8.4–10.2)
CARDIAC TROPONIN I PNL SERPL HS: 3 NG/L (ref ?–50)
CHLORIDE SERPL-SCNC: 102 MMOL/L (ref 96–108)
CO2 SERPL-SCNC: 26 MMOL/L (ref 21–32)
CREAT SERPL-MCNC: 0.7 MG/DL (ref 0.6–1.3)
EOSINOPHIL # BLD MANUAL: 0.19 THOUSAND/UL (ref 0–0.4)
EOSINOPHIL NFR BLD MANUAL: 2 % (ref 0–6)
ERYTHROCYTE [DISTWIDTH] IN BLOOD BY AUTOMATED COUNT: 13.8 % (ref 11.6–15.1)
FLUAV AG UPPER RESP QL IA.RAPID: NEGATIVE
FLUBV AG UPPER RESP QL IA.RAPID: NEGATIVE
GFR SERPL CREATININE-BSD FRML MDRD: 92 ML/MIN/1.73SQ M
GLUCOSE SERPL-MCNC: 92 MG/DL (ref 65–140)
HCT VFR BLD AUTO: 48.2 % (ref 34.8–46.1)
HGB BLD-MCNC: 16.1 G/DL (ref 11.5–15.4)
LIPASE SERPL-CCNC: 9 U/L (ref 11–82)
LYMPHOCYTES # BLD AUTO: 19 % (ref 14–44)
LYMPHOCYTES # BLD AUTO: 2.72 THOUSAND/UL (ref 0.6–4.47)
MCH RBC QN AUTO: 30 PG (ref 26.8–34.3)
MCHC RBC AUTO-ENTMCNC: 33.4 G/DL (ref 31.4–37.4)
MCV RBC AUTO: 90 FL (ref 82–98)
MONOCYTES # BLD AUTO: 0.94 THOUSAND/UL (ref 0–1.22)
MONOCYTES NFR BLD: 10 % (ref 4–12)
NEUTROPHILS # BLD MANUAL: 5.53 THOUSAND/UL (ref 1.85–7.62)
NEUTS SEG NFR BLD AUTO: 59 % (ref 43–75)
PLATELET # BLD AUTO: 280 THOUSANDS/UL (ref 149–390)
PLATELET BLD QL SMEAR: ADEQUATE
PMV BLD AUTO: 9.4 FL (ref 8.9–12.7)
POTASSIUM SERPL-SCNC: 3.9 MMOL/L (ref 3.5–5.3)
PROT SERPL-MCNC: 7.4 G/DL (ref 6.4–8.4)
RBC # BLD AUTO: 5.37 MILLION/UL (ref 3.81–5.12)
RBC MORPH BLD: NORMAL
SARS-COV+SARS-COV-2 AG RESP QL IA.RAPID: NEGATIVE
SODIUM SERPL-SCNC: 138 MMOL/L (ref 135–147)
VARIANT LYMPHS # BLD AUTO: 10 %
WBC # BLD AUTO: 9.37 THOUSAND/UL (ref 4.31–10.16)

## 2024-12-11 PROCEDURE — 83690 ASSAY OF LIPASE: CPT | Performed by: EMERGENCY MEDICINE

## 2024-12-11 PROCEDURE — 85027 COMPLETE CBC AUTOMATED: CPT | Performed by: EMERGENCY MEDICINE

## 2024-12-11 PROCEDURE — 36415 COLL VENOUS BLD VENIPUNCTURE: CPT

## 2024-12-11 PROCEDURE — 96374 THER/PROPH/DIAG INJ IV PUSH: CPT

## 2024-12-11 PROCEDURE — 87811 SARS-COV-2 COVID19 W/OPTIC: CPT | Performed by: EMERGENCY MEDICINE

## 2024-12-11 PROCEDURE — 93005 ELECTROCARDIOGRAM TRACING: CPT

## 2024-12-11 PROCEDURE — 71046 X-RAY EXAM CHEST 2 VIEWS: CPT

## 2024-12-11 PROCEDURE — 80053 COMPREHEN METABOLIC PANEL: CPT | Performed by: EMERGENCY MEDICINE

## 2024-12-11 PROCEDURE — 84484 ASSAY OF TROPONIN QUANT: CPT | Performed by: EMERGENCY MEDICINE

## 2024-12-11 PROCEDURE — 70450 CT HEAD/BRAIN W/O DYE: CPT

## 2024-12-11 PROCEDURE — 99285 EMERGENCY DEPT VISIT HI MDM: CPT | Performed by: EMERGENCY MEDICINE

## 2024-12-11 PROCEDURE — 87804 INFLUENZA ASSAY W/OPTIC: CPT | Performed by: EMERGENCY MEDICINE

## 2024-12-11 PROCEDURE — 85007 BL SMEAR W/DIFF WBC COUNT: CPT | Performed by: EMERGENCY MEDICINE

## 2024-12-11 PROCEDURE — 96375 TX/PRO/DX INJ NEW DRUG ADDON: CPT

## 2024-12-11 PROCEDURE — 99285 EMERGENCY DEPT VISIT HI MDM: CPT

## 2024-12-11 RX ORDER — PROMETHAZINE HYDROCHLORIDE 25 MG/ML
25 INJECTION, SOLUTION INTRAMUSCULAR; INTRAVENOUS ONCE
Status: COMPLETED | OUTPATIENT
Start: 2024-12-11 | End: 2024-12-11

## 2024-12-11 RX ORDER — METOCLOPRAMIDE HYDROCHLORIDE 5 MG/ML
10 INJECTION INTRAMUSCULAR; INTRAVENOUS ONCE
Status: COMPLETED | OUTPATIENT
Start: 2024-12-11 | End: 2024-12-11

## 2024-12-11 RX ORDER — ACETAMINOPHEN 325 MG/1
975 TABLET ORAL ONCE
Status: COMPLETED | OUTPATIENT
Start: 2024-12-11 | End: 2024-12-11

## 2024-12-11 RX ADMIN — METOCLOPRAMIDE 10 MG: 5 INJECTION, SOLUTION INTRAMUSCULAR; INTRAVENOUS at 11:44

## 2024-12-11 RX ADMIN — ACETAMINOPHEN 975 MG: 325 TABLET, FILM COATED ORAL at 11:44

## 2024-12-11 RX ADMIN — PROMETHAZINE HYDROCHLORIDE 25 MG: 25 INJECTION INTRAMUSCULAR; INTRAVENOUS at 12:38

## 2024-12-11 NOTE — DISCHARGE INSTRUCTIONS
You have been seen for a headache with associated symptoms. You should return to the ED if you develop vision changes, weakness, neck stiffness, persistent dizziness, or other worsening symptoms. Follow up with your primary care doctor.

## 2024-12-11 NOTE — ED PROVIDER NOTES
"Time reflects when diagnosis was documented in both MDM as applicable and the Disposition within this note       Time User Action Codes Description Comment    12/11/2024  2:48 PM Kailee Saez Add [R51.9] Nonintractable headache, unspecified chronicity pattern, unspecified headache type           ED Disposition       ED Disposition   Discharge    Condition   Good    Date/Time   Wed Dec 11, 2024  2:48 PM    Comment   Cheryl L Sturgeon discharge to home/self care.                   Assessment & Plan       Medical Decision Making  64-year-old female presents with headaches and feeling \"foggy\".  Neuroexam is normal, but will get a CT of the head to rule out intracranial hemorrhage as patient does not typically get headaches and notes some memory loss on Monday as well.  Based on history and exam, this does not seem to be a subarachnoid hemorrhage.  Abdomen is nontender, so imaging of the abdomen is not indicated.  Nurses already ordered a cardiac workup while patient was in triage, but I will add on labs, urine, and CT head.    Workup showed no acute abnormalities.  Patient felt better after medications. Discussed all results and plans with patient. Recommend follow up with primary care physician. Return precautions given. All questions answered.       Problems Addressed:  Nonintractable headache, unspecified chronicity pattern, unspecified headache type: acute illness or injury    Amount and/or Complexity of Data Reviewed  External Data Reviewed:      Details: Reviewed past medical history and medications  Labs: ordered.  Radiology: ordered and independent interpretation performed.  ECG/medicine tests: ordered and independent interpretation performed.    Risk  OTC drugs.  Prescription drug management.        ED Course as of 12/11/24 1456   Wed Dec 11, 2024   1130 This ECG was interpreted by me. The heart rate is 87, which is normal. The rhythm is regular. The axis is normal. The P waves are normal and the AL interval is " "normal. The QRS height is normal and width is normal. The ST segments are not elevated or depressed. The T waves are normal. The QT segment is normal. This ecg shows sinus rhythm.      1446 Pt unable to provide urine sample       Medications   acetaminophen (TYLENOL) tablet 975 mg (975 mg Oral Given 12/11/24 1144)   metoclopramide (REGLAN) injection 10 mg (10 mg Intravenous Given 12/11/24 1144)   promethazine (PHENERGAN) injection 25 mg (25 mg Intravenous Given 12/11/24 1238)       ED Risk Strat Scores                           SBIRT 20yo+      Flowsheet Row Most Recent Value   Initial Alcohol Screen: US AUDIT-C     1. How often do you have a drink containing alcohol? 0 Filed at: 12/11/2024 1052   2. How many drinks containing alcohol do you have on a typical day you are drinking?  0 Filed at: 12/11/2024 1052   3a. Male UNDER 65: How often do you have five or more drinks on one occasion? 0 Filed at: 12/11/2024 1052   3b. FEMALE Any Age, or MALE 65+: How often do you have 4 or more drinks on one occassion? 0 Filed at: 12/11/2024 1052   Audit-C Score 0 Filed at: 12/11/2024 1052   JANE: How many times in the past year have you...    Used an illegal drug or used a prescription medication for non-medical reasons? Never Filed at: 12/11/2024 1052                            History of Present Illness       Chief Complaint   Patient presents with    Memory Loss     Pt reports thinking that she had some memory loss on Monday. Associated with a headache and nausea. States that she she got sudden burning ear pain in both ears yesterday. Was seen at  and they didn't see anything. Has history of vertigo. Currently \"feels off\" with a slight headache, some blurry vision.        Past Medical History:   Diagnosis Date    History of DVT (deep vein thrombosis) 2013    History of pulmonary embolism 2013    Hypertension     Vertigo       Past Surgical History:   Procedure Laterality Date    BREAST MASS EXCISION      COLONOSCOPY      FL " "INJECTION RIGHT KNEE (ARTHROGRAM)  07/12/2022    IVC FILTER INSERTION        Family History   Problem Relation Age of Onset    Deep vein thrombosis Father     No Known Problems Sister     Breast cancer Brother     Colon polyps Daughter     Colon cancer Neg Hx       Social History     Tobacco Use    Smoking status: Never    Smokeless tobacco: Never   Vaping Use    Vaping status: Never Used   Substance Use Topics    Alcohol use: Yes     Comment: 1-2 x month    Drug use: Never      E-Cigarette/Vaping    E-Cigarette Use Never User       E-Cigarette/Vaping Substances    Nicotine No     THC No     CBD No     Flavoring No     Other No     Unknown No       I have reviewed and agree with the history as documented.     64-year-old female presents with headaches.  Patient reports that she has been feeling unwell since Monday.  She reports not remembering certain events that happened on Monday, but states that her memory has been normal since then.  She woke up yesterday morning with a headache in the frontal and left side of her head.  She states that she does not typically get headaches.  It was not sudden onset or the worst headache of her life.  She took some Advil and states that it helped the headache a little, but she still has the headache.  She states that she overall feels \"foggy\" and \"off\".  She has a history of vertigo, but states that this does not feel similar.  The only other symptom that she notes is nausea.  She states that she has had decreased appetite over the past couple of days secondary to this.  She denies abdominal pain.  She does not note any dizziness, weakness, numbness/tingling, vision changes, or any other neurologic symptoms.  Patient states that her left ear started hurting her, so she went to urgent care and was told that her ear looked okay when they looked inside of it.  Patient also reports a negative COVID test at home.        Review of Systems   Constitutional:  Negative for chills, fatigue " and fever.   HENT:  Negative for congestion, rhinorrhea and sore throat.    Eyes:  Negative for pain and redness.   Respiratory:  Negative for cough, chest tightness, shortness of breath and wheezing.    Cardiovascular:  Negative for chest pain and palpitations.   Gastrointestinal:  Positive for nausea. Negative for abdominal pain, diarrhea and vomiting.   Endocrine: Negative.    Genitourinary:  Negative for difficulty urinating and hematuria.   Musculoskeletal:  Negative for back pain and myalgias.   Skin:  Negative for pallor and rash.   Allergic/Immunologic: Negative.    Neurological:  Positive for headaches. Negative for dizziness, weakness and light-headedness.   Hematological: Negative.            Objective       ED Triage Vitals   Temperature Pulse Blood Pressure Respirations SpO2 Patient Position - Orthostatic VS   12/11/24 1051 12/11/24 1051 12/11/24 1054 12/11/24 1051 12/11/24 1051 --   98.5 °F (36.9 °C) 82 (!) 186/99 18 98 %       Temp Source Heart Rate Source BP Location FiO2 (%) Pain Score    12/11/24 1051 12/11/24 1051 -- -- 12/11/24 1144    Temporal Monitor   6      Vitals      Date and Time Temp Pulse SpO2 Resp BP Pain Score FACES Pain Rating User   12/11/24 1415 -- 68 97 % 16 130/81 -- -- EW   12/11/24 1330 -- 69 97 % 18 204/93 -- -- EW   12/11/24 1315 -- 66 96 % -- -- -- -- EW   12/11/24 1300 -- 70 95 % -- -- -- -- EW   12/11/24 1245 -- 67 92 % -- -- -- -- EW   12/11/24 1144 -- -- -- -- -- 6 -- AM   12/11/24 1054 -- -- -- -- 186/99 -- -- HR   12/11/24 1051 98.5 °F (36.9 °C) 82 98 % 18 -- -- -- HR            Physical Exam  Vitals and nursing note reviewed.   Constitutional:       General: She is not in acute distress.     Appearance: Normal appearance. She is not ill-appearing.   HENT:      Head: Normocephalic and atraumatic.   Eyes:      Conjunctiva/sclera: Conjunctivae normal.   Cardiovascular:      Rate and Rhythm: Normal rate and regular rhythm.      Heart sounds: No murmur heard.  Pulmonary:       Effort: Pulmonary effort is normal. No respiratory distress.      Breath sounds: Normal breath sounds. No wheezing, rhonchi or rales.   Abdominal:      General: Abdomen is flat. There is no distension.      Palpations: Abdomen is soft.      Tenderness: There is no abdominal tenderness. There is no guarding or rebound.   Musculoskeletal:         General: Normal range of motion.      Cervical back: Normal range of motion and neck supple.   Skin:     General: Skin is warm and dry.   Neurological:      General: No focal deficit present.      Mental Status: She is alert and oriented to person, place, and time.      Cranial Nerves: No cranial nerve deficit.      Sensory: No sensory deficit.      Motor: No weakness.         Results Reviewed       Procedure Component Value Units Date/Time    RBC Morphology Reflex Test [[id number]] Collected: 12/11/24 1058    Lab Status: Final result Specimen: Blood from Arm, Right Updated: 12/11/24 1301    Lipase [[id number]]  (Abnormal) Collected: 12/11/24 1143    Lab Status: Final result Specimen: Blood from Arm, Left Updated: 12/11/24 1213     Lipase 9 u/L     FLU/COVID Rapid Antigen (30 min. TAT) - Preferred screening test in ED [[id number]]  (Normal) Collected: 12/11/24 1143    Lab Status: Final result Specimen: Nares from Nose Updated: 12/11/24 1209     SARS COV Rapid Antigen Negative     Influenza A Rapid Antigen Negative     Influenza B Rapid Antigen Negative    Narrative:      This test has been performed using the [employer] Diane 2 FLU+SARS Antigen test under the Emergency Use Authorization (EUA). This test has been validated by the  and verified by the performing laboratory. The Diane uses lateral flow immunofluorescent sandwich assay to detect SARS-COV, Influenza A and Influenza B Antigen.     The [employer] Diane 2 SARS Antigen test does not differentiate between SARS-CoV and SARS-CoV-2.     Negative results are presumptive and may be confirmed with a molecular assay, if  necessary, for patient management. Negative results do not rule out SARS-CoV-2 or influenza infection and should not be used as the sole basis for treatment or patient management decisions. A negative test result may occur if the level of antigen in a sample is below the limit of detection of this test.     Positive results are indicative of the presence of viral antigens, but do not rule out bacterial infection or co-infection with other viruses.     All test results should be used as an adjunct to clinical observations and other information available to the provider.    FOR PEDIATRIC PATIENTS - copy/paste COVID Guidelines URL to browser: [email or web address]    CBC and differential [[id number]]  (Abnormal) Collected: 12/11/24 1058    Lab Status: Final result Specimen: Blood from Arm, Right Updated: 12/11/24 1202     WBC 9.37 Thousand/uL      RBC 5.37 Million/uL      Hemoglobin 16.1 g/dL      Hematocrit 48.2 %      MCV 90 fL      MCH 30.0 pg      MCHC 33.4 g/dL      RDW 13.8 %      MPV 9.4 fL      Platelets 280 Thousands/uL     Narrative:      This is an appended report.  These results have been appended to a previously verified report.    Manual Differential(PHLEBS Do Not Order) [[id number]]  (Abnormal) Collected: 12/11/24 1058    Lab Status: Final result Specimen: Blood from Arm, Right Updated: 12/11/24 1202     Segmented % 59 %      Lymphocytes % 19 %      Monocytes % 10 %      Eosinophils % 2 %      Basophils % 0 %      Atypical Lymphocytes % 10 %      Absolute Neutrophils 5.53 Thousand/uL      Absolute Lymphocytes 2.72 Thousand/uL      Absolute Monocytes 0.94 Thousand/uL      Absolute Eosinophils 0.19 Thousand/uL      Absolute Basophils 0.00 Thousand/uL      Total Counted --     RBC Morphology Normal     Platelet Estimate Adequate    HS Troponin 0hr (reflex protocol) [[id number]]  (Normal) Collected: 12/11/24 1058    Lab Status: Final result Specimen: Blood  from Arm, Right Updated: 12/11/24 1126     hs TnI 0hr 3 ng/L     UA w Reflex to Microscopic w Reflex to Culture [[id number]]     Lab Status: No result Specimen: Urine     Comprehensive metabolic panel [[id number]] Collected: 12/11/24 1058    Lab Status: Final result Specimen: Blood from Arm, Right Updated: 12/11/24 1119     Sodium 138 mmol/L      Potassium 3.9 mmol/L      Chloride 102 mmol/L      CO2 26 mmol/L      ANION GAP 10 mmol/L      BUN 14 mg/dL      Creatinine 0.70 mg/dL      Glucose 92 mg/dL      Calcium 9.1 mg/dL      AST 20 U/L      ALT 22 U/L      Alkaline Phosphatase 55 U/L      Total Protein 7.4 g/dL      Albumin 4.5 g/dL      Total Bilirubin 0.63 mg/dL      eGFR 92 ml/min/1.73sq m     Narrative:      [employer] guidelines for Chronic Kidney Disease (CKD):     Stage 1 with normal or high GFR (GFR > 90 mL/min/1.73 square meters)    Stage 2 Mild CKD (GFR = 60-89 mL/min/1.73 square meters)    Stage 3A Moderate CKD (GFR = 45-59 mL/min/1.73 square meters)    Stage 3B Moderate CKD (GFR = 30-44 mL/min/1.73 square meters)    Stage 4 Severe CKD (GFR = 15-29 mL/min/1.73 square meters)    Stage 5 End Stage CKD (GFR <15 mL/min/1.73 square meters)  Note: GFR calculation is accurate only with a steady state creatinine            CT head wo contrast   Final Interpretation by Beny Johnson DO (12/11 1410)      No acute intracranial abnormality.  Chronic microangiopathic changes.                  Resident: Neftali Chamorro I, the attending radiologist, have reviewed the images and agree with the final report above.      Workstation performed: [id number]         XR chest 2 views   ED Interpretation by Kailee Saez DO (12/11 1118)   No acute cardiopulmonary process          Procedures    ED Medication and Procedure Management   Prior to Admission Medications   Prescriptions Last Dose Informant Patient Reported? Taking?   Diclofenac Sodium (VOLTAREN) 1 %  Self No No   Sig: Apply 2 g  topically 4 (four) times a day   XARELTO 20 MG tablet  Self Yes No   diazepam (VALIUM) 5 mg tablet  Self No No   Sig: Take 1 tablet (5 mg total) by mouth once in imaging for anxiety for up to 1 dose   losartan (COZAAR) 25 mg tablet  Self Yes No   meclizine (ANTIVERT) 25 mg tablet  Self No No   Sig: Take 1 tablet (25 mg total) by mouth every 8 (eight) hours as needed for dizziness   omeprazole (PriLOSEC) 40 MG capsule   No No   Sig: Take 1 capsule (40 mg total) by mouth daily   ondansetron (Zofran ODT) 4 mg disintegrating tablet  Self No No   Sig: Take 1 tablet (4 mg total) by mouth every 6 (six) hours as needed for nausea or vomiting (Vertigo symptoms)   terbinafine (LamISIL) 1 % cream  Self Yes No   Sig: APPLY TOPICALLY TWICE A DAY AS NEEDED FOR RASH; USE ON ARMS FOR ONE WEEK      Facility-Administered Medications: None     Patient's Medications   Discharge Prescriptions    No medications on file     No discharge procedures on file.  ED SEPSIS DOCUMENTATION   Time reflects when diagnosis was documented in both MDM as applicable and the Disposition within this note       Time User Action Codes Description Comment    12/11/2024  2:48 PM Kailee Saez Add [R51.9] Nonintractable headache, unspecified chronicity pattern, unspecified headache type                  Kailee Saez DO  12/11/24 [id number]

## 2024-12-12 LAB
ATRIAL RATE: 87 BPM
P AXIS: 22 DEGREES
PR INTERVAL: 146 MS
QRS AXIS: -9 DEGREES
QRSD INTERVAL: 74 MS
QT INTERVAL: 380 MS
QTC INTERVAL: 458 MS
T WAVE AXIS: 2 DEGREES
VENTRICULAR RATE: 87 BPM

## 2024-12-12 PROCEDURE — 93010 ELECTROCARDIOGRAM REPORT: CPT | Performed by: INTERNAL MEDICINE

## 2025-02-05 ENCOUNTER — TELEPHONE (OUTPATIENT)
Dept: NEUROLOGY | Facility: CLINIC | Age: 65
End: 2025-02-05

## 2025-02-05 NOTE — TELEPHONE ENCOUNTER
Called patient and I left a message to reschedule her appointment tomorrow 02/06/25 due to the weather. I did let her know that I went ahead and scheduled her in the first available for 03/13/25 and to call back to either confirm or reschedule to a different time that will work for her.

## 2025-02-18 ENCOUNTER — PATIENT MESSAGE (OUTPATIENT)
Dept: NEUROLOGY | Facility: CLINIC | Age: 65
End: 2025-02-18

## 2025-02-18 ENCOUNTER — OFFICE VISIT (OUTPATIENT)
Dept: NEUROLOGY | Facility: CLINIC | Age: 65
End: 2025-02-18
Payer: COMMERCIAL

## 2025-02-18 VITALS
HEIGHT: 63 IN | DIASTOLIC BLOOD PRESSURE: 94 MMHG | WEIGHT: 190.7 LBS | SYSTOLIC BLOOD PRESSURE: 128 MMHG | TEMPERATURE: 97.2 F | HEART RATE: 81 BPM | BODY MASS INDEX: 33.79 KG/M2

## 2025-02-18 DIAGNOSIS — E53.8 B12 DEFICIENCY: ICD-10-CM

## 2025-02-18 DIAGNOSIS — R51.9 NEW ONSET HEADACHE: ICD-10-CM

## 2025-02-18 DIAGNOSIS — I26.99 PULMONARY EMBOLI (HCC): ICD-10-CM

## 2025-02-18 DIAGNOSIS — R51.9 NEW ONSET HEADACHE: Primary | ICD-10-CM

## 2025-02-18 DIAGNOSIS — H53.9 VISION CHANGES: ICD-10-CM

## 2025-02-18 DIAGNOSIS — R51.9 TEMPORAL PAIN: ICD-10-CM

## 2025-02-18 DIAGNOSIS — R29.2 HYPERREFLEXIA: ICD-10-CM

## 2025-02-18 PROBLEM — R68.89 HYPERLEXIA: Status: ACTIVE | Noted: 2025-02-18

## 2025-02-18 PROCEDURE — 99205 OFFICE O/P NEW HI 60 MIN: CPT | Performed by: PHYSICIAN ASSISTANT

## 2025-02-18 RX ORDER — SUMATRIPTAN 50 MG/1
50 TABLET, FILM COATED ORAL AS NEEDED
COMMUNITY
Start: 2025-01-29

## 2025-02-18 RX ORDER — CYPROHEPTADINE HYDROCHLORIDE 4 MG/1
4 TABLET ORAL
Qty: 30 TABLET | Refills: 0 | Status: SHIPPED | OUTPATIENT
Start: 2025-02-18

## 2025-02-18 RX ORDER — DEXAMETHASONE 2 MG/1
2 TABLET ORAL
Qty: 5 TABLET | Refills: 0 | Status: SHIPPED | OUTPATIENT
Start: 2025-02-18

## 2025-02-18 RX ORDER — RIZATRIPTAN BENZOATE 10 MG/1
10 TABLET ORAL AS NEEDED
Qty: 9 TABLET | Refills: 0 | Status: SHIPPED | OUTPATIENT
Start: 2025-02-18

## 2025-02-18 NOTE — PROGRESS NOTES
Neurology Ambulatory Visit  Name: Cheryl L Sturgeon       : 1960       MRN: [id number]   Encounter Provider: Cortney Thompson PA-C   Encounter Date: 2025  Encounter department: [facility]  :  Assessment & Plan  New onset headache  Due to new sudden onset headache after age 51 we will perform further blood work.  Will consider MRI of the brain, but, need to determine manufacture and model of her IVC filter before able to proceed with MRI of the brain.  Patient had CT of the head which did not show anything other than mild decreased attenuation likely representing microangiopathy.  Will do 5 days of dexamethasone 2 mg and cyproheptadine at bedtime for 7-30 nights.    At onset of headache patient may take rizatriptan.  May repeat in 2 hours if needed.  Limit of 3-week or 9 a month    After review with the MRI safety team, it was deemed feasible that patient may obtain an MRI of the brain with and without contrast using the 1.5 Crystal machine.  Therefore, due to her new sudden onset headache after the age of 51 we will proceed with MRI of the brain to rule out any nefarious structural abnormalities which could be causing these issues  Orders:    ESR-Bean+CRP; Future    dexamethasone (DECADRON) 2 mg tablet; Take 1 tablet (2 mg total) by mouth daily with breakfast    cyproheptadine (PERIACTIN) 4 mg tablet; Take 1 tablet (4 mg total) by mouth daily at bedtime as needed for allergies (headaches)    rizatriptan (MAXALT) 10 mg tablet; Take 1 tablet (10 mg total) by mouth as needed for migraine May repeat in 2 hours if needed.  Limit 3 a week or 9 a month    VAS temporal artery duplex; Future    MRI brain with and without contrast; Future    BUN; Future    Creatinine, serum; Future    MRI cervical spine with and without contrast; Future    Temporal pain  Due to new onset headache after age 50, we will proceed with blood work and temporal ultrasound to ensure there is no evidence of temporal  arteritis or giant cell arteritis  Orders:    ESR-Bean+CRP; Future    dexamethasone (DECADRON) 2 mg tablet; Take 1 tablet (2 mg total) by mouth daily with breakfast    cyproheptadine (PERIACTIN) 4 mg tablet; Take 1 tablet (4 mg total) by mouth daily at bedtime as needed for allergies (headaches)    rizatriptan (MAXALT) 10 mg tablet; Take 1 tablet (10 mg total) by mouth as needed for migraine May repeat in 2 hours if needed.  Limit 3 a week or 9 a month    VAS temporal artery duplex; Future    MRI brain with and without contrast; Future    Vision changes  Due to new onset vision changes we will proceed with ophthalmology referral for dilated eye exam and to ensure there is not any evidence of any papilledema  Orders:    Ambulatory Referral to Ophthalmology; Future    MRI brain with and without contrast; Future    MRI cervical spine with and without contrast; Future    Pulmonary emboli (HCC)  Patient currently on lifetime Xarelto.  Patient last had a coagulable workup approximately 12 to 15 years ago.  Therefore, will refer to hematology for further evaluation  Orders:    Ambulatory Referral to Hematology / Oncology; Future    MRI brain with and without contrast; Future    MRI cervical spine with and without contrast; Future    B12 deficiency    Orders:    Vitamin B12; Future    Hyperreflexia  Patient has significant hyperreflexia in upper and lower extremities, therefore, we will proceed with CT scan of the C-spine to ensure no structural abnormalities causing her hyperreflexia    After consultation with MRI safety, will change CT scan to MRI of the C-spine with and without contrast to rule out any structural abnormalities which could be causing her significant hyperreflexia  Orders:    MRI cervical spine with and without contrast; Future      Assessment and Plan  1. New onset headache  Assessment & Plan:  Due to new sudden onset headache after age 51 we will perform further blood work.  Will consider MRI of the  brain, but, need to determine manufacture and model of her IVC filter before able to proceed with MRI of the brain.  Patient had CT of the head which did not show anything other than mild decreased attenuation likely representing microangiopathy.  Will do 5 days of dexamethasone 2 mg and cyproheptadine at bedtime for 7-30 nights.    At onset of headache patient may take rizatriptan.  May repeat in 2 hours if needed.  Limit of 3-week or 9 a month  Orders:    ESR-Bean+CRP; Future    dexamethasone (DECADRON) 2 mg tablet; Take 1 tablet (2 mg total) by mouth daily with breakfast    cyproheptadine (PERIACTIN) 4 mg tablet; Take 1 tablet (4 mg total) by mouth daily at bedtime as needed for allergies (headaches)    CT spine cervical wo contrast; Future    rizatriptan (MAXALT) 10 mg tablet; Take 1 tablet (10 mg total) by mouth as needed for migraine May repeat in 2 hours if needed.  Limit 3 a week or 9 a month    VAS temporal artery duplex; Future    Orders:  -     ESR-Bean+CRP; Future  -     dexamethasone (DECADRON) 2 mg tablet; Take 1 tablet (2 mg total) by mouth daily with breakfast  -     cyproheptadine (PERIACTIN) 4 mg tablet; Take 1 tablet (4 mg total) by mouth daily at bedtime as needed for allergies (headaches)  -     CT spine cervical wo contrast; Future; Expected date: 02/18/2025  -     rizatriptan (MAXALT) 10 mg tablet; Take 1 tablet (10 mg total) by mouth as needed for migraine May repeat in 2 hours if needed.  Limit 3 a week or 9 a month  -     VAS temporal artery duplex; Future; Expected date: 02/18/2025  -     ESR-Bean+CRP  2. Temporal pain  Assessment & Plan:  Due to new onset headache after age 51, we will proceed with blood work and temporal ultrasound to ensure there is no evidence of temporal arteritis or giant cell arteritis  Orders:    ESR-Bean+CRP; Future    dexamethasone (DECADRON) 2 mg tablet; Take 1 tablet (2 mg total) by mouth daily with breakfast    cyproheptadine (PERIACTIN) 4 mg tablet; Take 1 tablet  (4 mg total) by mouth daily at bedtime as needed for allergies (headaches)    CT spine cervical wo contrast; Future    rizatriptan (MAXALT) 10 mg tablet; Take 1 tablet (10 mg total) by mouth as needed for migraine May repeat in 2 hours if needed.  Limit 3 a week or 9 a month    VAS temporal artery duplex; Future    Orders:  -     ESR-Bean+CRP; Future  -     dexamethasone (DECADRON) 2 mg tablet; Take 1 tablet (2 mg total) by mouth daily with breakfast  -     cyproheptadine (PERIACTIN) 4 mg tablet; Take 1 tablet (4 mg total) by mouth daily at bedtime as needed for allergies (headaches)  -     CT spine cervical wo contrast; Future; Expected date: 02/18/2025  -     rizatriptan (MAXALT) 10 mg tablet; Take 1 tablet (10 mg total) by mouth as needed for migraine May repeat in 2 hours if needed.  Limit 3 a week or 9 a month  -     VAS temporal artery duplex; Future; Expected date: 02/18/2025  -     ESR-Bean+CRP  3. Vision changes  Assessment & Plan:  Due to new onset vision changes we will proceed with ophthalmology referral for dilated eye exam and to ensure there is not any evidence of any papilledema  Orders:    Ambulatory Referral to Ophthalmology; Future    Orders:  -     Ambulatory Referral to Ophthalmology; Future  4. Pulmonary emboli (HCC)  Assessment & Plan:  Patient currently on lifetime Xarelto.  Patient last had a coagulable workup approximately 12 to 15 years ago.  Therefore, will refer to hematology for further evaluation  Orders:    Ambulatory Referral to Hematology / Oncology; Future    Orders:  -     Ambulatory Referral to Hematology / Oncology; Future  5. B12 deficiency  -     Vitamin B12; Future  -     Vitamin B12  6. Hyperreflexia  Assessment & Plan:  Patient has significant hyperreflexia in upper and lower extremities, therefore, we will proceed with CT scan of the C-spine to ensure no structural abnormalities causing her hyperreflexia  Orders:    CT spine cervical wo contrast; Future    Orders:  -     CT  spine cervical wo contrast; Future; Expected date: 02/18/2025    She will Return in about 6 weeks (around 4/1/2025).    History of Present Illness     HPI   Cheryl L Sturgeon is a 64 y.o. female with DVT, pulmonary embolism, hypertension, arthritis, atypical chest pain and vertigo, who presents to Neurology office for  her headaches.   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  She cannot remember certain events that happened on the day that her headache happened but her memory has been normal since that time.  She had a headache in the frontal and left side of her head.  States it is not typical that she gets headaches.   She also felt foggy and off. Patient get burning and sensitivity in her ears.  Felt like ears were clogged and a humming noise.  After this her vertigo started and lasted a few days.  This happened a few weeks ago.    QTc 12/11/2024 458 ms  History Tobacco use:  [x] No   [] Yes   [] Currently smoking [] Quit     Psychiatric History:  Depression: yes  Anxiety: yes    Family history:  Migraines:   [x] No   [] Yes    Aneurysms: [x] No   [] Yes      Headaches began at what age? Never really had headaches before October 2024    What medications do you take or have you taken for your headaches?   Current Preventive:   Losartan  Vitamin D  Xarelto  Meclizine    Current Abortive:   Sumatriptan  Ondansetron  Tylenol    Prior Preventive:   Folic acid    Prior Abortive:   Promethazine    What is your current pain level ?  No pain but more pressure  How often do the headaches occur?   Almost daily since December 2024  Are you ever headache free? no    Aura/warning ? No []Halos around lights, []Upset stomach, []Neck pain, []Light headed, []Blind spot, []Loss of vision Dizziness, []Lethargy, []Flashing light, []weakness/numbness[]Mood swings, []Scotomas  when  does it start?   how long does it last      What time of the day do the headaches start?   varies    How long do the headaches last?   A few days    Describe your usual headache ?   Throbbing, Pounding, Electric, and pressure    Where is your headache located?   frontalis and temporalis can be unilateral or bilateral    What is the intensity of pain?   Unclear to 10/10    Associated symptoms:   [x] Photophobia   [x]Phonophobia  [] Osmophobia  [x]Nausea   [x] Vomiting   [] Diarrhea  [] Stiff or sore neck   [x] Problems with concentration  [x] Blurred vision [] loss of vision  [] change in pupil size  [] Ptosis    [] Facial droop  [] red ear  [x] Lacrimation  [x] Nasal congestion/rhinorrhea  [x] Light-headed or dizzy     [x] Tinnitus   [] Hands or feet tingle or feel numb/paresthesias    [x] Prefer quiet, dark room, usually better propped up not flat    Number of days missed per month because of headaches:  Work (or school) days: NA  Social or Family activities: yes    Alternative therapies used in the past for headaches? [] Massage   [] physical therapy   [] chiropractor [] acupuncture [] acupressure   [] CBT  [] Biofeedback  [] other  Headache are worse if the patient: [x]cough,[x] sneeze,[x] bending over,    [] Exertion  Headache triggers:  none    What time of the year do headaches occur more frequently?  Just started  Have you seen someone else for headaches or pain? Yes, ER, PCP   Have you had trigger point injection performed and how often? No  Have you had Botox injection performed and how often? No   Have you had epidural injections or transforaminal injections performed? Yes, childbirth    Have you used CBD or THC for your headaches and how often? No    Are you current pregnant or planning on getting pregnant? No    Vitamin D 26.6  Have you ever had any Brain imaging? Yes  CT scan of the head   I personally reviewed these images.  Recent laboratory data was reviewed.  Medications and allergies were  "reviewed.       I reviewed her chart, as documented in Epic/Flotype, and summarized above.     Review of Systems  Constitutional: Negative.    HENT:  Positive for tinnitus.         Phonophobia    Eyes:  Positive for photophobia.   Respiratory: Negative.     Cardiovascular: Negative.    Gastrointestinal:  Positive for nausea.   Endocrine: Negative.    Genitourinary: Negative.    Musculoskeletal: Negative.    Skin: Negative.    Allergic/Immunologic: Negative.    Neurological:  Positive for dizziness and headaches.   Hematological: Negative.    Psychiatric/Behavioral: Negative.       I personally reviewed the ROS that was entered by the medical assistant    Objective     /94 (BP Location: Left arm, Patient Position: Sitting, Cuff Size: Standard)   Pulse 81   Temp (!) 97.2 °F (36.2 °C) (Temporal)   Ht 5' 3\" (1.6 m)   Wt 86.5 kg (190 lb 11.2 oz)   BMI 33.78 kg/m²    Physical Exam  Neurologic Exam          CONSTITUTIONAL: Well developed, well nourished, well groomed. No dysmorphic features.     Eyes:  PERRLA, EOM normal      Neck:  Normal ROM, neck supple.      HEENT:  Normocephalic atraumatic.    Chest:  Respirations regular and unlabored.    Cardiovascular:  Distal extremities warm  no observed significant swelling.    Musculoskeletal:  Full range of motion.  (see below under neurologic exam for evaluation of motor function and gait)   Skin:  warm and dry   Psychiatric:  Normal behavior and appropriate affect          SKULL AND SPINE  ROM: Full range of motion  Tenderness: No focal tenderness  Paravertebral Muscles: normal    MENTAL STATUS  Orientation: Alert and oriented x 3  Fund of knowledge: Intact.    CRANIAL NERVES  II: PERRL.  III, IV, VI: Extraocular movements intact.  No nystagmus.  V: Facial sensation normal V1-V3  VII: Facial movements normal and symmetric  VIII: Intact hearing bilaterally  IX, X: Palate elevation normal and symmetric  XI: Intact trapezius, SCM strength  XII: Tongue " protrudes to the midline    MOTOR (Upper and lower extremities)   Bulk/tone/abnormal movement: Normal muscle bulk and tone.  Drift: No pronator drift.  Strength: Strength 5/5 throughout.      COORDINATION   F/N: normal  FFM: normal  Station/Gait: Normal baseline gait.  Normal tandem gait    SENSORY  Temperature: normal  Vibration: normal  Pinprick: normal  Light touch: normal  Romberg sign absent but does sway    Reflexes:  deep tendon reflexes are 4+/4 throughout    Administrative Statements     I have spent a total time of 79 minutes in caring for this patient on the day of the visit/encounter including Diagnostic results, Prognosis, Risks and benefits of tx options, Instructions for management, Patient and family education, Importance of tx compliance, Risk factor reductions, Impressions, Counseling / Coordination of care, Documenting in the medical record, Reviewing/placing orders in the medical record (including tests, medications, and/or procedures), and Obtaining or reviewing history  .      Voice recognition software was used in the generation of this note. There may be unintentional errors including grammatical errors, spelling errors, or pronoun errors.

## 2025-02-18 NOTE — ASSESSMENT & PLAN NOTE
Patient has significant hyperreflexia in upper and lower extremities, therefore, we will proceed with CT scan of the C-spine to ensure no structural abnormalities causing her hyperreflexia    After consultation with MRI safety, will change CT scan to MRI of the C-spine with and without contrast to rule out any structural abnormalities which could be causing her significant hyperreflexia  Orders:    MRI cervical spine with and without contrast; Future

## 2025-02-18 NOTE — ASSESSMENT & PLAN NOTE
Patient currently on lifetime Xarelto.  Patient last had a coagulable workup approximately 12 to 15 years ago.  Therefore, will refer to hematology for further evaluation  Orders:    Ambulatory Referral to Hematology / Oncology; Future    MRI brain with and without contrast; Future    MRI cervical spine with and without contrast; Future

## 2025-02-18 NOTE — ASSESSMENT & PLAN NOTE
Due to new onset headache after age 50, we will proceed with blood work and temporal ultrasound to ensure there is no evidence of temporal arteritis or giant cell arteritis  Orders:    ESR-Bean+CRP; Future    dexamethasone (DECADRON) 2 mg tablet; Take 1 tablet (2 mg total) by mouth daily with breakfast    cyproheptadine (PERIACTIN) 4 mg tablet; Take 1 tablet (4 mg total) by mouth daily at bedtime as needed for allergies (headaches)    rizatriptan (MAXALT) 10 mg tablet; Take 1 tablet (10 mg total) by mouth as needed for migraine May repeat in 2 hours if needed.  Limit 3 a week or 9 a month    VAS temporal artery duplex; Future    MRI brain with and without contrast; Future

## 2025-02-18 NOTE — PATIENT INSTRUCTIONS
For 5 days take decadron 2 mg in the am  For next 5-30 nights take cyproheptadine 4 mg at bedtime    Check Vitamin D to see if 8820-3046 IU daily  Blood work   CT scan of the cervical spine  Ultrasound of your temporal arteries  Referral to hematology for your clotting  Referral to ophthalmlogy for dilated eye exam.  See an MD or DO (not OD)    At onset of your headaches, take rizatriptan 10 mg.  May repeat in 2 hours if needed.  Limit of 3 a week or 9 a month  If this fails, please reach out

## 2025-02-18 NOTE — ASSESSMENT & PLAN NOTE
Due to new onset vision changes we will proceed with ophthalmology referral for dilated eye exam and to ensure there is not any evidence of any papilledema  Orders:    Ambulatory Referral to Ophthalmology; Future    MRI brain with and without contrast; Future    MRI cervical spine with and without contrast; Future

## 2025-02-18 NOTE — PROGRESS NOTES
Review of Systems   Constitutional: Negative.    HENT:  Positive for tinnitus.         Phonophobia    Eyes:  Positive for photophobia.   Respiratory: Negative.     Cardiovascular: Negative.    Gastrointestinal:  Positive for nausea.   Endocrine: Negative.    Genitourinary: Negative.    Musculoskeletal: Negative.    Skin: Negative.    Allergic/Immunologic: Negative.    Neurological:  Positive for dizziness and headaches.   Hematological: Negative.    Psychiatric/Behavioral: Negative.

## 2025-02-18 NOTE — ASSESSMENT & PLAN NOTE
Due to new sudden onset headache after age 51 we will perform further blood work.  Will consider MRI of the brain, but, need to determine manufacture and model of her IVC filter before able to proceed with MRI of the brain.  Patient had CT of the head which did not show anything other than mild decreased attenuation likely representing microangiopathy.  Will do 5 days of dexamethasone 2 mg and cyproheptadine at bedtime for 7-30 nights.    At onset of headache patient may take rizatriptan.  May repeat in 2 hours if needed.  Limit of 3-week or 9 a month    After review with the MRI safety team, it was deemed feasible that patient may obtain an MRI of the brain with and without contrast using the 1.5 Crystal machine.  Therefore, due to her new sudden onset headache after the age of 51 we will proceed with MRI of the brain to rule out any nefarious structural abnormalities which could be causing these issues  Orders:    ESR-Bean+CRP; Future    dexamethasone (DECADRON) 2 mg tablet; Take 1 tablet (2 mg total) by mouth daily with breakfast    cyproheptadine (PERIACTIN) 4 mg tablet; Take 1 tablet (4 mg total) by mouth daily at bedtime as needed for allergies (headaches)    rizatriptan (MAXALT) 10 mg tablet; Take 1 tablet (10 mg total) by mouth as needed for migraine May repeat in 2 hours if needed.  Limit 3 a week or 9 a month    VAS temporal artery duplex; Future    MRI brain with and without contrast; Future    BUN; Future    Creatinine, serum; Future    MRI cervical spine with and without contrast; Future

## 2025-02-19 ENCOUNTER — TELEPHONE (OUTPATIENT)
Dept: NEUROLOGY | Facility: CLINIC | Age: 65
End: 2025-02-19

## 2025-02-19 DIAGNOSIS — R51.9 NEW ONSET HEADACHE: Primary | ICD-10-CM

## 2025-02-19 RX ORDER — RIZATRIPTAN BENZOATE 10 MG/1
TABLET ORAL
Qty: 9 TABLET | Refills: 0 | OUTPATIENT
Start: 2025-02-19

## 2025-02-19 RX ORDER — LORAZEPAM 2 MG/1
TABLET ORAL
Qty: 2 TABLET | Refills: 0 | Status: SHIPPED | OUTPATIENT
Start: 2025-02-19

## 2025-02-19 NOTE — TELEPHONE ENCOUNTER
Reason for call:   [x] Prior Auth  [] Other:     Caller:  [] Patient  [x] Pharmacy  Name:  [facility]/pharmacy #[id number] [facility] [place]   Address:   Callback Number:     Medication: rizatriptan (MAXALT) 10 mg as directed       Ordering Provider:   [] PCP/Provider -   [x] Speciality/Provider - [facility]     Has the patient tried other medications and failed? If failed, which medications did they fail?    [] No   [] Yes -     Is the patient's insurance updated in EPIC?   [x] Yes   [] No     Is a copy of the patient's insurance scanned in EPIC?   [x] Yes   [] No

## 2025-02-19 NOTE — TELEPHONE ENCOUNTER
Cheryl L Sturgeon to [facility] (supporting Cortney Thompson PA-C)         2/18/25  6:39 PM  Good Evening …..rizatriptan prescription can’t be filled at [facility] with insurance until doctor gives reasons for prescribing etc was told from  pharmacy The pharmacy supposed to reach out to you as well

## 2025-02-19 NOTE — TELEPHONE ENCOUNTER
Called pharm to confirm that they are processing 9 tab per 30 days as rizatriptan is listed on [facility] formulary     They are processing 9 tabs per 30 and gets rejection that   PA is needed, not further info   PA dept-[phone]    Called perform rx at [phone] and spoke to nichole.  Pt filled sumatriptan on 1/29 for 9 tabs and that is why pharm is getting rejection.  Advised that pt is now prescribed rizatriptan instead of sumatriptan  She is able to provide an override for the rizatriptan and pharm just needs to reprocess.     Called pharm and made them aware.  She will inactivate sumatriptan script    [employer] message sent to pt

## 2025-02-19 NOTE — TELEPHONE ENCOUNTER
Spoke in great detail with patient today regarding MRI safety with her IVC filter in place.  Case was discussed with [facility]  and it was deemed safe for patient to have MRI of the brain and cervical spine and a 1.5 Crystal machine at [facility].  I did explain this to the patient and all of her questions were answered.  I did send in lorazepam for her to take 1 hour prior to MRI and then 1 immediately prior to MRI as long as she has a .  Patient will call to schedule her testing and I did remind her not to schedule before 3 weeks from today so we can get approval from insurance.  Will attempt to contact her PCP to discuss in further detail as well

## 2025-03-18 DIAGNOSIS — R51.9 NEW ONSET HEADACHE: ICD-10-CM

## 2025-03-18 DIAGNOSIS — R51.9 TEMPORAL PAIN: ICD-10-CM

## 2025-03-18 RX ORDER — CYPROHEPTADINE HYDROCHLORIDE 4 MG/1
4 TABLET ORAL
Qty: 90 TABLET | Refills: 1 | Status: SHIPPED | OUTPATIENT
Start: 2025-03-18

## 2025-03-20 ENCOUNTER — HOSPITAL ENCOUNTER (OUTPATIENT)
Dept: MRI IMAGING | Facility: HOSPITAL | Age: 65
Discharge: HOME | End: 2025-03-20
Payer: COMMERCIAL

## 2025-03-20 DIAGNOSIS — I26.99 PULMONARY EMBOLI (HCC): ICD-10-CM

## 2025-03-20 DIAGNOSIS — R51.9 NEW ONSET HEADACHE: ICD-10-CM

## 2025-03-20 DIAGNOSIS — R51.9 TEMPORAL PAIN: ICD-10-CM

## 2025-03-20 DIAGNOSIS — H53.9 VISION CHANGES: ICD-10-CM

## 2025-03-20 DIAGNOSIS — R29.2 HYPERREFLEXIA: ICD-10-CM

## 2025-03-20 PROCEDURE — A9585 GADOBUTROL INJECTION: HCPCS | Performed by: PHYSICIAN ASSISTANT

## 2025-03-20 PROCEDURE — 70553 MRI BRAIN STEM W/O & W/DYE: CPT

## 2025-03-20 PROCEDURE — 72156 MRI NECK SPINE W/O & W/DYE: CPT

## 2025-03-20 RX ORDER — GADOBUTROL 604.72 MG/ML
8 INJECTION INTRAVENOUS
Status: COMPLETED | OUTPATIENT
Start: 2025-03-20 | End: 2025-03-20

## 2025-03-20 RX ADMIN — GADOBUTROL 8 ML: 604.72 INJECTION INTRAVENOUS at 22:38

## 2025-03-23 DIAGNOSIS — R51.9 NEW ONSET HEADACHE: ICD-10-CM

## 2025-03-23 DIAGNOSIS — R51.9 TEMPORAL PAIN: ICD-10-CM

## 2025-03-24 ENCOUNTER — RESULTS FOLLOW-UP (OUTPATIENT)
Dept: NEUROLOGY | Facility: CLINIC | Age: 65
End: 2025-03-24

## 2025-03-24 RX ORDER — RIZATRIPTAN BENZOATE 10 MG/1
10 TABLET ORAL AS NEEDED
Qty: 12 TABLET | Refills: 5 | Status: SHIPPED | OUTPATIENT
Start: 2025-03-24

## 2025-04-02 ENCOUNTER — TELEPHONE (OUTPATIENT)
Dept: NEUROLOGY | Facility: CLINIC | Age: 65
End: 2025-04-02

## 2025-04-02 NOTE — TELEPHONE ENCOUNTER
Patient called back in to re-schedule . I have reschedule sooner available. She emphasized needing to be seen sooner due to headaches concerns. Please advise and let patient know if there is anything sooner . Thanks.

## 2025-04-02 NOTE — TELEPHONE ENCOUNTER
Called patient and I left a message to call back to reschedule her 04/02/25 appointment due to Cortney being out of the office If patient calls back please schedule in any open slot and forward to a member of the team that can schedule.

## 2025-04-04 NOTE — TELEPHONE ENCOUNTER
Pt called stating she accidentally selected for an appt on 4/3. Her appt was canceled on 4/2 due to provider not being in the office. Pt needed to r/s. Accepted 11/5 at 230pm w/Jay. Added to wait list.

## 2025-04-08 NOTE — TELEPHONE ENCOUNTER
Called patient and I left a message to call back to offer sooner appointment for tomorrow 04/09/25 at 09:30am. Patient knows I am offering to others so if she calls back prior to being filled please schedule.     Please keep appointment in for 60 minutes.

## 2025-04-09 ENCOUNTER — OFFICE VISIT (OUTPATIENT)
Dept: NEUROLOGY | Facility: CLINIC | Age: 65
End: 2025-04-09
Payer: COMMERCIAL

## 2025-04-09 VITALS
BODY MASS INDEX: 33.84 KG/M2 | WEIGHT: 191 LBS | TEMPERATURE: 97.9 F | DIASTOLIC BLOOD PRESSURE: 83 MMHG | HEIGHT: 63 IN | SYSTOLIC BLOOD PRESSURE: 132 MMHG | HEART RATE: 86 BPM

## 2025-04-09 DIAGNOSIS — T75.3XXA: ICD-10-CM

## 2025-04-09 DIAGNOSIS — R51.9 NEW ONSET HEADACHE: ICD-10-CM

## 2025-04-09 DIAGNOSIS — G43.709 CHRONIC MIGRAINE WITHOUT AURA WITHOUT STATUS MIGRAINOSUS, NOT INTRACTABLE: Primary | ICD-10-CM

## 2025-04-09 PROCEDURE — 99215 OFFICE O/P EST HI 40 MIN: CPT | Performed by: PHYSICIAN ASSISTANT

## 2025-04-09 RX ORDER — OMEPRAZOLE 20 MG/1
1 CAPSULE, DELAYED RELEASE ORAL DAILY
COMMUNITY
Start: 2025-03-30

## 2025-04-09 RX ORDER — ACETAZOLAMIDE 250 MG/1
TABLET ORAL
Qty: 30 TABLET | Refills: 0 | Status: SHIPPED | OUTPATIENT
Start: 2025-04-09

## 2025-04-09 RX ORDER — LORATADINE 10 MG/1
10 TABLET ORAL DAILY
COMMUNITY
Start: 2025-03-29

## 2025-04-09 RX ORDER — TRIAMCINOLONE ACETONIDE 1 MG/G
1 OINTMENT TOPICAL AS NEEDED
COMMUNITY
Start: 2025-04-01

## 2025-04-09 RX ORDER — PROCHLORPERAZINE MALEATE 10 MG
10 TABLET ORAL EVERY 6 HOURS PRN
Qty: 10 TABLET | Refills: 3 | Status: SHIPPED | OUTPATIENT
Start: 2025-04-09

## 2025-04-09 NOTE — ASSESSMENT & PLAN NOTE
Continue medications from other providers    At onset of moderate or severe headache take rizatriptan 10mg.  IF sudden onset or severe also take prochlorperazine with this.  May repeat the rizatriptan in 2 hours if needed.    If you repeat the rizatriptan and HAVE NOT taken the prochlorperazine, take with this dose.  Rizatriptan limit of 3 a week or 12 a month.  Prochlorperazine may be repeated in 8 hours as needed  Every night you take a rizatriptan, take cyproheptadine 4 mg at bedtime    Orders:  •  prochlorperazine (COMPAZINE) 10 mg tablet; Take 1 tablet (10 mg total) by mouth every 6 (six) hours as needed for nausea (migraine)

## 2025-04-09 NOTE — PATIENT INSTRUCTIONS
Starting TODAY take acetazolamide 250 mg twice a day, 1 in am and one late afternoon, not bedtime until you return home from trip    Continue medications from other providers    At onset of moderate or severe headache take rizatriptan 10mg.  IF sudden onset or severe also take prochlorperazine with this.  May repeat the rizatriptan in 2 hours if needed.    If you repeat the rizatriptan and HAVE NOT taken the prochlorperazine, take with this dose.  Rizatriptan limit of 3 a week or 12 a month.  Prochlorperazine may be repeated in 8 hours as needed  Every night you take a rizatriptan, take cyproheptadine 4 mg at bedtime

## 2025-04-09 NOTE — PROGRESS NOTES
Name: Cheryl L Sturgeon      : 1960      MRN: [id number]  Encounter Provider: Cortney Thompson PA-C  Encounter Date: 2025   Encounter department: NEUROLOGY [facility]  :  Assessment & Plan  Chronic migraine without aura without status migrainosus, not intractable  Continue medications from other providers    At onset of moderate or severe headache take rizatriptan 10mg.  IF sudden onset or severe also take prochlorperazine with this.  May repeat the rizatriptan in 2 hours if needed.    If you repeat the rizatriptan and HAVE NOT taken the prochlorperazine, take with this dose.  Rizatriptan limit of 3 a week or 12 a month.  Prochlorperazine may be repeated in 8 hours as needed  Every night you take a rizatriptan, take cyproheptadine 4 mg at bedtime    Orders:  •  prochlorperazine (COMPAZINE) 10 mg tablet; Take 1 tablet (10 mg total) by mouth every 6 (six) hours as needed for nausea (migraine)    New onset headache  Please see ophthalmology  Orders:  •  Ambulatory Referral to Ophthalmology; Future    Airplane sickness    Orders:  •  acetaZOLAMIDE (DIAMOX) 250 mg tablet; Start taking twice a day, second dose in the afternoon not bedtime until day after return from trip          History of Present Illness   HPI   Cheryl L Sturgeon is a 64 y.o. female with DVT, pulmonary embolism, hypertension, arthritis, atypical chest pain and vertigo, who presents to Neurology office for  her headaches.   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  She cannot remember certain events that happened on the day that her headache happened but her memory has been normal since that time.  She had a headache in the frontal and left side of her head.  States it is not typical that she gets headaches.   She also felt foggy and off. Patient get burning and sensitivity in  her ears.  Felt like ears were clogged and a humming noise.  After this her vertigo started and lasted a few days.  This happened a few weeks ago.    Interval update as of 4/9/2025:  Patient has made some improvement since last visit.  Is not having 10 out of 10 headaches every day.  Patient states she is still having vertigo and fogginess.  She did have 1 headache which woke her up at 3 in the morning.  Patient did not have her lab work done.  Patient has not seen ophthalmology or hematology as of yet     QTc 12/11/2024 458 ms  History Tobacco use:  [x] No   [] Yes   [] Currently smoking [] Quit      Psychiatric History:  Depression: yes  Anxiety: yes     Family history:  Migraines:   [x] No   [] Yes    Aneurysms: [x] No   [] Yes       Headaches began at what age? Never really had headaches before October 2024     What medications do you take or have you taken for your headaches?   Current Preventive:   Losartan  Vitamin D  Xarelto  Meclizine  Cyproheptadine     Current Abortive:   Rizatriptan  Ondansetron  Tylenol  Cyproheptadine  Dexamethasone     Prior Preventive:   Folic acid     Prior Abortive:   Promethazine  Sumatriptan  What is your current pain level ?  4/10  How often do the headaches occur?   Almost daily since December 2024, 1 severe a week  Are you ever headache free? no     Aura/warning ? No []Halos around lights, []Upset stomach, []Neck pain, []Light headed, []Blind spot, []Loss of vision Dizziness, []Lethargy, []Flashing light, []weakness/numbness[]Mood swings, []Scotomas  when does it start?   how long does it last       What time of the day do the headaches start?   Varies, did have 1 since last visit that woke her up at 3 am     How long do the headaches last?   If takes medication will go away in an hour or 2 but not lasting multiple days, prior, A few days     Describe your usual headache ?   Throbbing, Pounding, Electric, and pressure     Where is your headache located?   frontalis and  temporalis can be unilateral or bilateral     What is the intensity of pain?   2-6/10 most days but gets to a 10/10 1 a week     Associated symptoms:   [x] Photophobia   [x]Phonophobia  [] Osmophobia  [x]Nausea   [x] Vomiting   [] Diarrhea  [] Stiff or sore neck   [x] Problems with concentration  [x] Blurred vision [] loss of vision  [] change in pupil size  [] Ptosis    [] Facial droop  [] red ear  [x] Lacrimation  [x] Nasal congestion/rhinorrhea  [x] Light-headed or dizzy     [x] Tinnitus   [] Hands or feet tingle or feel numb/paresthesias    [x] Prefer quiet, dark room, usually better propped up not flat     Number of days missed per month because of headaches:  Work (or school) days: NA  Social or Family activities: yes     Alternative therapies used in the past for headaches? [] Massage   [] physical therapy   [] chiropractor [] acupuncture [] acupressure   [] CBT  [] Biofeedback  [] other  Headache are worse if the patient: [x]cough,[x] sneeze,[x] bending over,     [] Exertion  Headache triggers:  none     What time of the year do headaches occur more frequently?  Just started  Have you seen someone else for headaches or pain? Yes, ER, PCP   Have you had trigger point injection performed and how often? No  Have you had Botox injection performed and how often? No   Have you had epidural injections or transforaminal injections performed? Yes, childbirth     Have you used CBD or THC for your headaches and how often? No     Are you current pregnant or planning on getting pregnant? No     Vitamin D 26.6  As of 4/9/2025 ESR, CRP, B12 still pending  Have you ever had any Brain imaging? Yes  CT scan of the head  3/20/2025 MRI C-spine  Disc osteophyte complex. Uncovertebral and facet arthropathy contribute to mild-moderate right with mild left neuroforaminal narrowing. Mild spinal canal stenosis.     C6-C7: Disc osteophyte complex. Uncovertebral and facet arthropathy contribute to moderate-marked left with mild right  "neuroforaminal narrowing. Mild spinal canal stenosis.     C7-T1: Mild bilateral neuroforaminal narrowing. No significant spinal canal stenosis.  Multilevel spondylotic changes of the cervical spine    3/20/2025 MRI brain  No acute infarct, significant mass effect or midline shift.     Reviewed these images with patient during office visit on 4/29/2025   I personally reviewed these images.  Recent laboratory data was reviewed.  Medications and allergies were reviewed.     Review of Systems I have personally reviewed the MA's review of systems and made changes as necessary.         Objective   /83 (BP Location: Left arm, Patient Position: Sitting, Cuff Size: Standard)   Pulse 86   Temp 97.9 °F (36.6 °C) (Temporal)   Ht 5' 3\" (1.6 m)   Wt 86.6 kg (191 lb)   BMI 33.83 kg/m²     Physical Exam  Neurological Exam  CONSTITUTIONAL: Well developed, well nourished, well groomed. No dysmorphic features.     HEENT:  Normocephalic atraumatic.    Chest:  Respirations regular and unlabored.    Psychiatric:  Normal behavior and appropriate affect      MENTAL STATUS  Orientation: Alert and oriented x 3  Fund of knowledge: Intact.        Administrative Statements   I have spent a total time of 48 minutes in caring for this patient on the day of the visit/encounter including Diagnostic results, Prognosis, Risks and benefits of tx options, Instructions for management, Patient and family education, Importance of tx compliance, Risk factor reductions, Impressions, Counseling / Coordination of care, Documenting in the medical record, Reviewing/placing orders in the medical record (including tests, medications, and/or procedures), and Obtaining or reviewing history  .  "

## 2025-04-17 DIAGNOSIS — T75.3XXA: ICD-10-CM

## 2025-04-18 RX ORDER — ACETAZOLAMIDE 250 MG/1
TABLET ORAL
Qty: 180 TABLET | Refills: 1 | OUTPATIENT
Start: 2025-04-18

## 2025-05-14 LAB
BUN SERPL-MCNC: 12 MG/DL (ref 8–27)
CREAT SERPL-MCNC: 0.82 MG/DL (ref 0.57–1)
CRP SERPL-MCNC: 5 MG/L (ref 0–10)
EGFR: 80 ML/MIN/1.73
ERYTHROCYTE [SEDIMENTATION RATE] IN BLOOD BY WESTERGREN METHOD: 17 MM/HR (ref 0–40)
VIT B12 SERPL-MCNC: 876 PG/ML (ref 232–1245)

## 2025-07-28 ENCOUNTER — OFFICE VISIT (OUTPATIENT)
Age: 65
End: 2025-07-28
Payer: COMMERCIAL

## 2025-07-28 VITALS
SYSTOLIC BLOOD PRESSURE: 124 MMHG | BODY MASS INDEX: 34.36 KG/M2 | HEIGHT: 63 IN | DIASTOLIC BLOOD PRESSURE: 66 MMHG | WEIGHT: 193.9 LBS

## 2025-07-28 DIAGNOSIS — R10.2 PELVIC PAIN: ICD-10-CM

## 2025-07-28 DIAGNOSIS — Z80.3 FAMILY HISTORY OF BREAST CANCER: ICD-10-CM

## 2025-07-28 DIAGNOSIS — Z12.31 SCREENING MAMMOGRAM FOR BREAST CANCER: ICD-10-CM

## 2025-07-28 DIAGNOSIS — Z12.4 SCREENING FOR MALIGNANT NEOPLASM OF THE CERVIX: ICD-10-CM

## 2025-07-28 DIAGNOSIS — Z11.51 SPECIAL SCREENING EXAMINATION FOR HUMAN PAPILLOMAVIRUS (HPV): ICD-10-CM

## 2025-07-28 DIAGNOSIS — Z01.419 WOMEN'S ANNUAL ROUTINE GYNECOLOGICAL EXAMINATION: Primary | ICD-10-CM

## 2025-07-28 PROCEDURE — 99386 PREV VISIT NEW AGE 40-64: CPT | Performed by: OBSTETRICS & GYNECOLOGY

## 2025-07-31 LAB
C TRACH RRNA CVX QL NAA+PROBE: NEGATIVE
CYTOLOGIST CVX/VAG CYTO: NORMAL
DX ICD CODE: NORMAL
N GONORRHOEA RRNA CVX QL NAA+PROBE: NEGATIVE
OTHER STN SPEC: NORMAL
OTHER STN SPEC: NORMAL
PATH REPORT.FINAL DX SPEC: NORMAL
SL AMB NOTE:: NORMAL
SL AMB SPECIMEN ADEQUACY: NORMAL
SL AMB TEST METHODOLOGY: NORMAL

## 2025-08-06 PROBLEM — K44.9 DIAPHRAGMATIC HERNIA WITHOUT OBSTRUCTION OR GANGRENE: Status: ACTIVE | Noted: 2025-08-06

## 2025-08-06 PROBLEM — D64.9 ANEMIA, UNSPECIFIED: Status: ACTIVE | Noted: 2025-08-06

## 2025-08-06 PROBLEM — D68.69 OTHER THROMBOPHILIA (HCC): Status: ACTIVE | Noted: 2025-08-06

## 2025-08-06 PROBLEM — E66.01 MORBID (SEVERE) OBESITY DUE TO EXCESS CALORIES (HCC): Status: ACTIVE | Noted: 2025-08-06

## 2025-08-06 PROBLEM — R41.3 OTHER AMNESIA: Status: ACTIVE | Noted: 2025-08-06

## 2025-08-06 PROBLEM — Z95.828 PRESENCE OF INFERIOR VENA CAVA FILTER: Status: ACTIVE | Noted: 2025-08-06

## 2025-08-06 PROBLEM — L71.9 ROSACEA, UNSPECIFIED: Status: ACTIVE | Noted: 2025-08-06

## 2025-08-06 PROBLEM — G43.909 MIGRAINE, UNSPECIFIED, NOT INTRACTABLE, WITHOUT STATUS MIGRAINOSUS: Status: ACTIVE | Noted: 2025-08-06
